# Patient Record
Sex: FEMALE | Race: WHITE | NOT HISPANIC OR LATINO | Employment: OTHER | URBAN - METROPOLITAN AREA
[De-identification: names, ages, dates, MRNs, and addresses within clinical notes are randomized per-mention and may not be internally consistent; named-entity substitution may affect disease eponyms.]

---

## 2018-02-18 ENCOUNTER — HOSPITAL ENCOUNTER (EMERGENCY)
Facility: MEDICAL CENTER | Age: 78
End: 2018-02-18
Attending: EMERGENCY MEDICINE
Payer: MEDICARE

## 2018-02-18 ENCOUNTER — APPOINTMENT (OUTPATIENT)
Dept: RADIOLOGY | Facility: MEDICAL CENTER | Age: 78
End: 2018-02-18
Attending: EMERGENCY MEDICINE
Payer: MEDICARE

## 2018-02-18 VITALS
BODY MASS INDEX: 27.4 KG/M2 | RESPIRATION RATE: 18 BRPM | HEIGHT: 67 IN | HEART RATE: 68 BPM | DIASTOLIC BLOOD PRESSURE: 59 MMHG | SYSTOLIC BLOOD PRESSURE: 126 MMHG | WEIGHT: 174.6 LBS | TEMPERATURE: 98.9 F | OXYGEN SATURATION: 92 %

## 2018-02-18 DIAGNOSIS — Z72.0 TOBACCO ABUSE: ICD-10-CM

## 2018-02-18 DIAGNOSIS — J44.1 ACUTE EXACERBATION OF CHRONIC OBSTRUCTIVE PULMONARY DISEASE (COPD) (HCC): ICD-10-CM

## 2018-02-18 DIAGNOSIS — J98.4 PNEUMONITIS: ICD-10-CM

## 2018-02-18 PROCEDURE — 71045 X-RAY EXAM CHEST 1 VIEW: CPT

## 2018-02-18 PROCEDURE — 304561 HCHG STAT O2

## 2018-02-18 PROCEDURE — 700111 HCHG RX REV CODE 636 W/ 250 OVERRIDE (IP): Performed by: EMERGENCY MEDICINE

## 2018-02-18 PROCEDURE — 94640 AIRWAY INHALATION TREATMENT: CPT

## 2018-02-18 PROCEDURE — A9270 NON-COVERED ITEM OR SERVICE: HCPCS | Performed by: EMERGENCY MEDICINE

## 2018-02-18 PROCEDURE — 700101 HCHG RX REV CODE 250: Performed by: EMERGENCY MEDICINE

## 2018-02-18 PROCEDURE — 700102 HCHG RX REV CODE 250 W/ 637 OVERRIDE(OP): Performed by: EMERGENCY MEDICINE

## 2018-02-18 PROCEDURE — 99284 EMERGENCY DEPT VISIT MOD MDM: CPT

## 2018-02-18 RX ORDER — DULOXETIN HYDROCHLORIDE 60 MG/1
60 CAPSULE, DELAYED RELEASE ORAL DAILY
Status: SHIPPED | COMMUNITY
End: 2019-01-01

## 2018-02-18 RX ORDER — PREDNISONE 20 MG/1
20 TABLET ORAL DAILY
Qty: 6 TAB | Refills: 0 | Status: SHIPPED | OUTPATIENT
Start: 2018-02-18 | End: 2018-02-24

## 2018-02-18 RX ORDER — TRIAMTERENE AND HYDROCHLOROTHIAZIDE 37.5; 25 MG/1; MG/1
1 TABLET ORAL DAILY
Status: SHIPPED | COMMUNITY
End: 2019-01-01

## 2018-02-18 RX ORDER — PREDNISONE 20 MG/1
40 TABLET ORAL ONCE
Status: DISCONTINUED | OUTPATIENT
Start: 2018-02-18 | End: 2018-02-18

## 2018-02-18 RX ORDER — IPRATROPIUM BROMIDE AND ALBUTEROL SULFATE 2.5; .5 MG/3ML; MG/3ML
3 SOLUTION RESPIRATORY (INHALATION) ONCE
Status: COMPLETED | OUTPATIENT
Start: 2018-02-18 | End: 2018-02-18

## 2018-02-18 RX ORDER — BENZONATATE 100 MG/1
100 CAPSULE ORAL 3 TIMES DAILY PRN
Qty: 30 CAP | Refills: 0 | Status: SHIPPED | OUTPATIENT
Start: 2018-02-18 | End: 2019-01-01

## 2018-02-18 RX ORDER — POTASSIUM CHLORIDE 750 MG/1
10 TABLET, EXTENDED RELEASE ORAL 2 TIMES DAILY
Status: SHIPPED | COMMUNITY
End: 2019-01-01

## 2018-02-18 RX ORDER — ALPRAZOLAM 1 MG/1
1 TABLET ORAL 2 TIMES DAILY PRN
COMMUNITY

## 2018-02-18 RX ORDER — PREDNISONE 20 MG/1
20 TABLET ORAL ONCE
Status: COMPLETED | OUTPATIENT
Start: 2018-02-18 | End: 2018-02-18

## 2018-02-18 RX ORDER — ASPIRIN 325 MG
650 TABLET ORAL EVERY 6 HOURS PRN
Status: SHIPPED | COMMUNITY
End: 2019-01-01

## 2018-02-18 RX ORDER — DOXYCYCLINE 100 MG/1
100 TABLET ORAL ONCE
Status: COMPLETED | OUTPATIENT
Start: 2018-02-18 | End: 2018-02-18

## 2018-02-18 RX ORDER — ESOMEPRAZOLE MAGNESIUM 40 MG/1
40 CAPSULE, DELAYED RELEASE ORAL
Status: SHIPPED | COMMUNITY
End: 2019-01-01

## 2018-02-18 RX ORDER — QUETIAPINE FUMARATE 25 MG/1
25 TABLET, FILM COATED ORAL
Status: SHIPPED | COMMUNITY
End: 2019-01-01

## 2018-02-18 RX ORDER — DOXYCYCLINE 100 MG/1
100 CAPSULE ORAL 2 TIMES DAILY
Qty: 14 CAP | Refills: 0 | Status: SHIPPED | OUTPATIENT
Start: 2018-02-18 | End: 2018-02-25

## 2018-02-18 RX ADMIN — DOXYCYCLINE 100 MG: 100 TABLET ORAL at 09:06

## 2018-02-18 RX ADMIN — PREDNISONE 20 MG: 20 TABLET ORAL at 09:06

## 2018-02-18 RX ADMIN — IPRATROPIUM BROMIDE AND ALBUTEROL SULFATE 3 ML: .5; 3 SOLUTION RESPIRATORY (INHALATION) at 09:19

## 2018-02-18 ASSESSMENT — COPD QUESTIONNAIRES
HAVE YOU SMOKED AT LEAST 100 CIGARETTES IN YOUR ENTIRE LIFE: YES
DO YOU EVER COUGH UP ANY MUCUS OR PHLEGM?: YES, A FEW DAYS A WEEK OR MONTH
DURING THE PAST 4 WEEKS HOW MUCH DID YOU FEEL SHORT OF BREATH: MOST  OR ALL OF THE TIME
COPD SCREENING SCORE: 8

## 2018-02-18 ASSESSMENT — LIFESTYLE VARIABLES: EVER_SMOKED: YES

## 2018-02-18 NOTE — ED PROVIDER NOTES
"ED Provider Note    CHIEF COMPLAINT  Chief Complaint   Patient presents with   • Cough     x 1 week   • Congestion   • Head Ache       HPI  Thea Kessler is a 77 y.o. female who presents stating for about 7-10 days she's had cough, congestion in the sinuses and chest and low-grade headache. Denies being around anybody with influenza or a bad chest cold. Does have a history of smoking a pack a day and does not want to quit. Felt feverish yesterday but did not take her temperature and has not had any hemoptysis, chest pain or pressure. She is concerned about the possibility of pneumonia and has not been on recent steroids    REVIEW OF SYSTEMS  See HPI for further details. All other systems are negative.     PAST MEDICAL HISTORY  Past Medical History:   Diagnosis Date   • Arthritis    • Depression    • GERD (gastroesophageal reflux disease)    • Hypokalemia    • Swelling     Left side of body, pt states will swell on Left side unless taking Maxide       FAMILY HISTORY  History reviewed. No pertinent family history.    SOCIAL HISTORY   reports that she has been smoking.  She does not have any smokeless tobacco history on file. She reports that she drinks alcohol. She reports that she does not use drugs.    SURGICAL HISTORY  Past Surgical History:   Procedure Laterality Date   • APPENDECTOMY     • CHOLECYSTECTOMY     • HEMORRHOIDECTOMY         CURRENT MEDICATIONS  Home Medications    **Home medications have not yet been reviewed for this encounter**         ALLERGIES  Allergies   Allergen Reactions   • Pcn [Penicillins]    • Sulfa Drugs        PHYSICAL EXAM  VITAL SIGNS: /59   Pulse 60   Temp 37.2 °C (98.9 °F)   Resp 18   Ht 1.702 m (5' 7\")   Wt 79.2 kg (174 lb 9.7 oz)   SpO2 90%   BMI 27.35 kg/m²    Constitutional: Well developed, Well nourished, mild respiratory distress, Non-toxic appearance. Does smell of cigarettes  HENT: Normocephalic, Atraumatic, Bilateral external ears normal, Oropharynx is " clear mucous membranes are moist. No oral exudates or nasal discharge.   Eyes: Pupils are equal round and reactive, EOMI, Conjunctiva normal, No discharge.   Neck: Normal range of motion, No tenderness, Supple, No stridor. No meningismus.  Lymphatic: No lymphadenopathy noted.   Cardiovascular: Regular rate and rhythm without murmur rub or gallop.  Thorax & Lungs: Bilateral end-expiratory wheezes with slightly prolonged expiratory phase. There is no chest wall tenderness.   Abdomen: Soft non-tender non-distended. There is no rebound or guarding. No organomegaly is appreciated. Bowel sounds are normal.  Skin: Normal without rash.   Back: No CVA or spinal tenderness.   Extremities: Intact distal pulses, No edema, No tenderness, No cyanosis, No clubbing. Capillary refill is less than 2 seconds.  Musculoskeletal: Good range of motion in all major joints. No tenderness to palpation or major deformities noted.   Neurologic: Alert & oriented x 3, Normal motor function, Normal sensory function, No focal deficits noted. Reflexes are normal.  Psychiatric: Affect normal, Judgment normal, Mood normal. There is no suicidal ideation or patient reported hallucinations.       RADIOLOGY/PROCEDURES  DX-CHEST-PORTABLE (1 VIEW)   Final Result         1.  Mild cardiomegaly.      2.  Pulmonary opacifications could be due to edema, possibly from CHF. Bronchitis or pneumonitis are also possible.      3.  No consolidations.            COURSE & MEDICAL DECISION MAKING  Pertinent Labs & Imaging studies reviewed. (See chart for details)  Patient was concerned about pneumonia. Pulse oximetry initially was normal but she did have some mild hypoxia developed in the emergency department even after we gave her DuoNeb for and expiratory wheezes from COPD exacerbation. Chest x-ray did reveal that she does have some ulnar area opacifications likely secondary pneumonitis    And given her low dose prednisone as she did not want to take higher dose as  recommended. We will give her 20 mg here and 20 mg daily for the next 6 days. I explained her chest x-ray findings and she continued to have pulse oximetry anywhere between 88 and 91% on room air but she feels comfortable and this may be her baseline. She does not want home oxygen and states that she is willing to cut her cigarette consumption and half slow her emphysema and this is recommended    She is discharged on doxycycline for pneumonitis, prednisone low dose and instructed to reduce her smoking. I spent more than 5 minutes with her about smoking cessation and she states discharged in stable condition will follow up with Dr. Ackerman this coming week    FINAL IMPRESSION  1. Acute exacerbation of chronic obstructive pulmonary disease (COPD) (CMS-HCC)    2. Pneumonitis    3. Tobacco abuse    Tobacco cessation counseling, 5 minutes         Electronically signed by: Laith Silva, 2/18/2018 9:37 AM

## 2018-02-18 NOTE — ED NOTES
"Chief Complaint   Patient presents with   • Cough     x 1 week   • Congestion   • Head Ache     /59   Pulse 77   Temp 37.2 °C (98.9 °F)   Resp 18   Ht 1.702 m (5' 7\")   Wt 79.2 kg (174 lb 9.7 oz)   SpO2 89%   BMI 27.35 kg/m²     Pt placed on 2L NC to maintain SPO2 90%.    "

## 2018-02-18 NOTE — FLOWSHEET NOTE
02/18/18 0919   Events/Summary/Plan   Events/Summary/Plan Tx given in ER   Interdisciplinary Plan of Care-Goals (Indications)   Obstructive Ventilatory Defect or Pulmonary Disease without Obvious Obstruction Strong Subjective / Objective Improvement   Interdisciplinary Plan of Care-Outcomes    Bronchodilator Outcome Patient at Stable Baseline   Education   Education Yes - Pt. / Family has been Instructed in use of Respiratory Medications and Adverse Reactions   RT Assessment of Delivered Medications   Evaluation of Medication Delivery Daily Yes-- Pt /Family has been Instructed in use of Respiratory Medications and Adverse Reactions   SVN Group   #SVN Performed Yes   Given By: Mouthpiece   Date SVN Last Changed 02/18/18   Date SVN Next Change Due (Q 7 Days) 02/25/18   Respiratory WDL   Respiratory (WDL) X   Chest Exam   Work Of Breathing / Effort Mild   Respiration 18   Pulse 60   Breath Sounds   Pre/Post Intervention Pre Intervention Assessment   RUL Breath Sounds Clear   RML Breath Sounds Clear   RLL Breath Sounds Diminished   GABY Breath Sounds Clear   LLL Breath Sounds Diminished   Oximetry   Continuous Oximetry Yes   Oxygen   Home O2 Use Prior To Admission? No  (NC was not connected on O2)   Pulse Oximetry 90 %   O2 (LPM) 0   O2 (FiO2) 21   O2 Daily Delivery Respiratory  Room Air with O2 Available

## 2018-02-18 NOTE — ED NOTES
"DC instructions and prescription x3 given to pt/daughter. Pt verbalized understanding. Pt encouraged to take big deep breaths. Discussed importance of smoking cessation. Verbalized understanding. Pt daughter verbalized unhappiness with ERP not prescribing Seroqeul and Cymbalta for pt while she is away from home. \"What is she supposed to do without them? I dont understand why he cant just prescribe a couple of pills.\" Situation explained to pt/daughter. Continue to verbalize unhappiness. Pt steady on feet with 0 s/s distress noted. Pt dcd home with daughter to drive.   "

## 2018-02-18 NOTE — DISCHARGE INSTRUCTIONS
Chronic Obstructive Pulmonary Disease Exacerbation  Chronic obstructive pulmonary disease (COPD) is a common lung condition in which airflow from the lungs is limited. COPD is a general term that can be used to describe many different lung problems that limit airflow, including chronic bronchitis and emphysema. COPD exacerbations are episodes when breathing symptoms become much worse and require extra treatment. Without treatment, COPD exacerbations can be life threatening, and frequent COPD exacerbations can cause further damage to your lungs.  CAUSES  · Respiratory infections.  · Exposure to smoke.  · Exposure to air pollution, chemical fumes, or dust.  Sometimes there is no apparent cause or trigger.  RISK FACTORS  · Smoking cigarettes.  · Older age.  · Frequent prior COPD exacerbations.  SIGNS AND SYMPTOMS  · Increased coughing.  · Increased thick spit (sputum) production.  · Increased wheezing.  · Increased shortness of breath.  · Rapid breathing.  · Chest tightness.  DIAGNOSIS  Your medical history, a physical exam, and tests will help your health care provider make a diagnosis. Tests may include:  · A chest X-ray.  · Basic lab tests.  · Sputum testing.  · An arterial blood gas test.  TREATMENT  Depending on the severity of your COPD exacerbation, you may need to be admitted to a hospital for treatment. Some of the treatments commonly used to treat COPD exacerbations are:   · Antibiotic medicines.  · Bronchodilators. These are drugs that expand the air passages. They may be given with an inhaler or nebulizer. Spacer devices may be needed to help improve drug delivery.  · Corticosteroid medicines.  · Supplemental oxygen therapy.  · Airway clearing techniques, such as noninvasive ventilation (NIV) and positive expiratory pressure (PEP). These provide respiratory support through a mask or other noninvasive device.  HOME CARE INSTRUCTIONS  · Do not smoke. Quitting smoking is very important to prevent COPD from  getting worse and exacerbations from happening as often.  · Avoid exposure to all substances that irritate the airway, especially to tobacco smoke.  · If you were prescribed an antibiotic medicine, finish it all even if you start to feel better.  · Take all medicines as directed by your health care provider. It is important to use correct technique with inhaled medicines.  · Drink enough fluids to keep your urine clear or pale yellow (unless you have a medical condition that requires fluid restriction).  · Use a cool mist vaporizer. This makes it easier to clear your chest when you cough.  · If you have a home nebulizer and oxygen, continue to use them as directed.  · Maintain all necessary vaccinations to prevent infections.  · Exercise regularly.  · Eat a healthy diet.  · Keep all follow-up appointments as directed by your health care provider.  SEEK IMMEDIATE MEDICAL CARE IF:  · You have worsening shortness of breath.  · You have trouble talking.  · You have severe chest pain.  · You have blood in your sputum.  · You have a fever.  · You have weakness, vomit repeatedly, or faint.  · You feel confused.  · You continue to get worse.  MAKE SURE YOU:  · Understand these instructions.  · Will watch your condition.  · Will get help right away if you are not doing well or get worse.     This information is not intended to replace advice given to you by your health care provider. Make sure you discuss any questions you have with your health care provider.     Document Released: 10/14/2008 Document Revised: 01/08/2016 Document Reviewed: 08/22/2014  [employer] Interactive Patient Education ©2016 [employer]    Smoking Hazards  Smoking cigarettes is extremely bad for your health. Tobacco smoke has over 200 known poisons in it. It contains the poisonous gases nitrogen oxide and carbon monoxide. There are over 60 chemicals in tobacco smoke that cause cancer. Some of the chemicals found in cigarette smoke include:   · Cyanide.     · Benzene.    · Formaldehyde.    · Methanol (wood alcohol).    · Acetylene (fuel used in welding torches).    · Ammonia.    Even smoking lightly shortens your life expectancy by several years. You can greatly reduce the risk of medical problems for you and your family by stopping now. Smoking is the most preventable cause of death and disease in our society. Within days of quitting smoking, your circulation improves, you decrease the risk of having a heart attack, and your lung capacity improves. There may be some increased phlegm in the first few days after quitting, and it may take months for your lungs to clear up completely. Quitting for 10 years reduces your risk of developing lung cancer to almost that of a nonsmoker.   WHAT ARE THE RISKS OF SMOKING?  Cigarette smokers have an increased risk of many serious medical problems, including:  · Lung cancer.    · Lung disease (such as pneumonia, bronchitis, and emphysema).    · Heart attack and chest pain due to the heart not getting enough oxygen (angina).    · Heart disease and peripheral blood vessel disease.    · Hypertension.    · Stroke.    · Oral cancer (cancer of the lip, mouth, or voice box).    · Bladder cancer.    · Pancreatic cancer.    · Cervical cancer.    · Pregnancy complications, including premature birth.    · Stillbirths and smaller  babies, birth defects, and genetic damage to sperm.    · Early menopause.    · Lower estrogen level for women.    · Infertility.    · Facial wrinkles.    · Blindness.    · Increased risk of broken bones (fractures).    · Senile dementia.    · Stomach ulcers and internal bleeding.    · Delayed wound healing and increased risk of complications during surgery.  Because of secondhand smoke exposure, children of smokers have an increased risk of the following:   · Sudden infant death syndrome (SIDS).    · Respiratory infections.    · Lung cancer.    · Heart disease.    · Ear infections.    WHY IS SMOKING  ADDICTIVE?  Nicotine is the chemical agent in tobacco that is capable of causing addiction or dependence. When you smoke and inhale, nicotine is absorbed rapidly into the bloodstream through your lungs. Both inhaled and noninhaled nicotine may be addictive.   WHAT ARE THE BENEFITS OF QUITTING?   There are many health benefits to quitting smoking. Some are:   · The likelihood of developing cancer and heart disease decreases. Health improvements are seen almost immediately.    · Blood pressure, pulse rate, and breathing patterns start returning to normal soon after quitting.    · People who quit may see an improvement in their overall quality of life.    HOW DO YOU QUIT SMOKING?  Smoking is an addiction with both physical and psychological effects, and longtime habits can be hard to change. Your health care provider can recommend:  · Programs and community resources, which may include group support, education, or therapy.  · Replacement products, such as patches, gum, and nasal sprays. Use these products only as directed. Do not replace cigarette smoking with electronic cigarettes (commonly called e-cigarettes). The safety of e-cigarettes is unknown, and some may contain harmful chemicals.  FOR MORE INFORMATION  · American Lung Association: www.lung.org  · American Cancer Society: www.cancer.org     This information is not intended to replace advice given to you by your health care provider. Make sure you discuss any questions you have with your health care provider.     Document Released: 01/25/2006 Document Revised: 10/08/2014 Document Reviewed: 06/09/2014  [employer] Interactive Patient Education ©2016 [employer]    Pulmonary Function Tests  A pulmonary function test measures how well you move air in and out of your lungs. There are a number of tests that can be done. The most often the measurement used is the peak flow test. This test can also be used at home. Examples of these include the Johnny Zone, Astech, Mini  "Valladares, and Spir-O-Flow meters.   Measuring how well air moves out of your lung can be used as a reliable guide to treatment, even if you have no symptoms. Normal values of peak flow rates depend on your age, height, and sex. If your peak flow measurement is lower than normal, treatment with inhaled medicines can be started to prevent a more serious episode of asthma or emphysema. You should measure your peak flows daily in the morning, and more often throughout the day if you have any symptoms such as shortness of breath or cough. Keep a record of your peak flow results to review with your caregiver. The mouth piece of your peak flow meter should be washed with soap and water once a week. Otherwise it does not need special care.  To measure your peak flow, proper technique is very important. Follow the instructions that accompany your meter. Usually you must set the indicator to 0, take a deep breath, and blow as quickly and forcefully as possible into the meter. Reset the meter and repeat the measurement twice; record your best result. If your test results put you in the \"green\" zone, this means your ability to move air is % of normal. No special treatment may be needed. If your test puts you in the \"yellow\" zone, you are between 50 and 80% of normal. Treatment should be started. Results in the \"red\" zone mean you are below 50% of normal and you should initiate treatment and call your caregiver right away.  Document Released: 01/25/2006 Document Revised: 03/11/2013 Document Reviewed: 12/18/2006  [employer]® Patient Information ©2013 [email or web address].  "

## 2018-02-18 NOTE — ED NOTES
"Med rec updated and complete  Allergies reviewed  Pt had a list of medications, went over list of medications returned list of medications back to pts daughter.  Pt states \"No antibiotics in the last 30 days\".    "

## 2019-01-01 ENCOUNTER — APPOINTMENT (OUTPATIENT)
Dept: RADIOLOGY | Facility: MEDICAL CENTER | Age: 79
End: 2019-01-01
Attending: EMERGENCY MEDICINE
Payer: MEDICARE

## 2019-01-01 ENCOUNTER — APPOINTMENT (OUTPATIENT)
Dept: RADIOLOGY | Facility: MEDICAL CENTER | Age: 79
DRG: 085 | End: 2019-01-01
Attending: INTERNAL MEDICINE
Payer: MEDICARE

## 2019-01-01 ENCOUNTER — APPOINTMENT (OUTPATIENT)
Dept: RADIOLOGY | Facility: MEDICAL CENTER | Age: 79
DRG: 085 | End: 2019-01-01
Attending: NEUROLOGICAL SURGERY
Payer: MEDICARE

## 2019-01-01 ENCOUNTER — HOSPITAL ENCOUNTER (EMERGENCY)
Facility: MEDICAL CENTER | Age: 79
End: 2019-06-13
Attending: EMERGENCY MEDICINE
Payer: MEDICARE

## 2019-01-01 ENCOUNTER — HOSPITAL ENCOUNTER (INPATIENT)
Facility: MEDICAL CENTER | Age: 79
LOS: 13 days | DRG: 085 | End: 2019-06-26
Attending: EMERGENCY MEDICINE | Admitting: SURGERY
Payer: MEDICARE

## 2019-01-01 ENCOUNTER — APPOINTMENT (OUTPATIENT)
Dept: RADIOLOGY | Facility: MEDICAL CENTER | Age: 79
DRG: 085 | End: 2019-01-01
Attending: EMERGENCY MEDICINE
Payer: MEDICARE

## 2019-01-01 ENCOUNTER — APPOINTMENT (OUTPATIENT)
Dept: RADIOLOGY | Facility: MEDICAL CENTER | Age: 79
DRG: 085 | End: 2019-01-01
Attending: NURSE PRACTITIONER
Payer: MEDICARE

## 2019-01-01 ENCOUNTER — APPOINTMENT (OUTPATIENT)
Dept: RADIOLOGY | Facility: MEDICAL CENTER | Age: 79
DRG: 085 | End: 2019-01-01
Attending: SURGERY
Payer: MEDICARE

## 2019-01-01 ENCOUNTER — HOSPITAL ENCOUNTER (INPATIENT)
Facility: REHABILITATION | Age: 79
End: 2019-01-01
Admitting: PHYSICAL MEDICINE & REHABILITATION
Payer: MEDICARE

## 2019-01-01 ENCOUNTER — APPOINTMENT (OUTPATIENT)
Dept: CARDIOLOGY | Facility: MEDICAL CENTER | Age: 79
DRG: 085 | End: 2019-01-01
Attending: SURGERY
Payer: MEDICARE

## 2019-01-01 VITALS
DIASTOLIC BLOOD PRESSURE: 70 MMHG | WEIGHT: 165.34 LBS | SYSTOLIC BLOOD PRESSURE: 138 MMHG | HEIGHT: 67 IN | RESPIRATION RATE: 13 BRPM | HEART RATE: 65 BPM | TEMPERATURE: 98.1 F | OXYGEN SATURATION: 99 % | BODY MASS INDEX: 25.95 KG/M2

## 2019-01-01 VITALS — HEIGHT: 71 IN | BODY MASS INDEX: 25.19 KG/M2 | TEMPERATURE: 98.5 F | WEIGHT: 179.9 LBS

## 2019-01-01 DIAGNOSIS — E87.6 HYPOKALEMIA: ICD-10-CM

## 2019-01-01 DIAGNOSIS — I60.9 SAH (SUBARACHNOID HEMORRHAGE) (HCC): ICD-10-CM

## 2019-01-01 DIAGNOSIS — G81.91 RIGHT HEMIPARESIS (HCC): ICD-10-CM

## 2019-01-01 DIAGNOSIS — R55 SYNCOPE, UNSPECIFIED SYNCOPE TYPE: ICD-10-CM

## 2019-01-01 DIAGNOSIS — R94.31 ABNORMAL EKG: ICD-10-CM

## 2019-01-01 DIAGNOSIS — T45.7X5A ADVERSE EFFECT OF ANTIPLATELET AGENT, INITIAL ENCOUNTER: ICD-10-CM

## 2019-01-01 DIAGNOSIS — I62.9 INTRACRANIAL BLEEDING (HCC): ICD-10-CM

## 2019-01-01 DIAGNOSIS — R13.10 DYSPHAGIA, UNSPECIFIED TYPE: ICD-10-CM

## 2019-01-01 DIAGNOSIS — Z79.01 CHRONIC ANTICOAGULATION: ICD-10-CM

## 2019-01-01 LAB
ABO GROUP BLD: NORMAL
ABO GROUP BLD: NORMAL
ALBUMIN SERPL BCP-MCNC: 3 G/DL (ref 3.2–4.9)
ALBUMIN SERPL BCP-MCNC: 3 G/DL (ref 3.2–4.9)
ALBUMIN SERPL BCP-MCNC: 3.2 G/DL (ref 3.2–4.9)
ALBUMIN SERPL BCP-MCNC: 3.6 G/DL (ref 3.2–4.9)
ALBUMIN/GLOB SERPL: 0.9 G/DL
ALBUMIN/GLOB SERPL: 0.9 G/DL
ALBUMIN/GLOB SERPL: 1 G/DL
ALBUMIN/GLOB SERPL: 1.2 G/DL
ALP SERPL-CCNC: 128 U/L (ref 30–99)
ALP SERPL-CCNC: 68 U/L (ref 30–99)
ALP SERPL-CCNC: 80 U/L (ref 30–99)
ALP SERPL-CCNC: 84 U/L (ref 30–99)
ALT SERPL-CCNC: 10 U/L (ref 2–50)
ALT SERPL-CCNC: 11 U/L (ref 2–50)
ALT SERPL-CCNC: 13 U/L (ref 2–50)
ALT SERPL-CCNC: 20 U/L (ref 2–50)
ANION GAP SERPL CALC-SCNC: 11 MMOL/L (ref 0–11.9)
ANION GAP SERPL CALC-SCNC: 12 MMOL/L (ref 0–11.9)
ANION GAP SERPL CALC-SCNC: 13 MMOL/L (ref 0–11.9)
ANION GAP SERPL CALC-SCNC: 8 MMOL/L (ref 0–11.9)
ANION GAP SERPL CALC-SCNC: 8 MMOL/L (ref 0–11.9)
ANION GAP SERPL CALC-SCNC: 9 MMOL/L (ref 0–11.9)
APPEARANCE UR: ABNORMAL
APPEARANCE UR: CLEAR
APTT PPP: 30 SEC (ref 24.7–36)
APTT PPP: 30.1 SEC (ref 24.7–36)
AST SERPL-CCNC: 23 U/L (ref 12–45)
AST SERPL-CCNC: 25 U/L (ref 12–45)
AST SERPL-CCNC: 28 U/L (ref 12–45)
AST SERPL-CCNC: 42 U/L (ref 12–45)
BACTERIA #/AREA URNS HPF: ABNORMAL /HPF
BACTERIA #/AREA URNS HPF: NEGATIVE /HPF
BACTERIA BLD CULT: NORMAL
BACTERIA BLD CULT: NORMAL
BACTERIA UR CULT: ABNORMAL
BACTERIA UR CULT: ABNORMAL
BASOPHILS # BLD AUTO: 0.3 % (ref 0–1.8)
BASOPHILS # BLD AUTO: 0.6 % (ref 0–1.8)
BASOPHILS # BLD AUTO: 0.6 % (ref 0–1.8)
BASOPHILS # BLD AUTO: 0.7 % (ref 0–1.8)
BASOPHILS # BLD AUTO: 0.7 % (ref 0–1.8)
BASOPHILS # BLD AUTO: 0.9 % (ref 0–1.8)
BASOPHILS # BLD AUTO: 0.9 % (ref 0–1.8)
BASOPHILS # BLD AUTO: 1.1 % (ref 0–1.8)
BASOPHILS # BLD: 0.02 K/UL (ref 0–0.12)
BASOPHILS # BLD: 0.07 K/UL (ref 0–0.12)
BASOPHILS # BLD: 0.08 K/UL (ref 0–0.12)
BASOPHILS # BLD: 0.09 K/UL (ref 0–0.12)
BASOPHILS # BLD: 0.1 K/UL (ref 0–0.12)
BASOPHILS # BLD: 0.1 K/UL (ref 0–0.12)
BASOPHILS # BLD: 0.11 K/UL (ref 0–0.12)
BASOPHILS # BLD: 0.13 K/UL (ref 0–0.12)
BILIRUB SERPL-MCNC: 0.9 MG/DL (ref 0.1–1.5)
BILIRUB SERPL-MCNC: 1 MG/DL (ref 0.1–1.5)
BILIRUB SERPL-MCNC: 1.2 MG/DL (ref 0.1–1.5)
BILIRUB SERPL-MCNC: 1.5 MG/DL (ref 0.1–1.5)
BILIRUB UR QL STRIP.AUTO: NEGATIVE
BILIRUB UR QL STRIP.AUTO: NEGATIVE
BLD GP AB SCN SERPL QL: NORMAL
BLD GP AB SCN SERPL QL: NORMAL
BUN SERPL-MCNC: 11 MG/DL (ref 8–22)
BUN SERPL-MCNC: 11 MG/DL (ref 8–22)
BUN SERPL-MCNC: 12 MG/DL (ref 8–22)
BUN SERPL-MCNC: 12 MG/DL (ref 8–22)
BUN SERPL-MCNC: 14 MG/DL (ref 8–22)
BUN SERPL-MCNC: 15 MG/DL (ref 8–22)
BUN SERPL-MCNC: 15 MG/DL (ref 8–22)
BUN SERPL-MCNC: 16 MG/DL (ref 8–22)
BUN SERPL-MCNC: 22 MG/DL (ref 8–22)
BUN SERPL-MCNC: 23 MG/DL (ref 8–22)
CALCIUM SERPL-MCNC: 8.4 MG/DL (ref 8.5–10.5)
CALCIUM SERPL-MCNC: 8.8 MG/DL (ref 8.5–10.5)
CALCIUM SERPL-MCNC: 8.9 MG/DL (ref 8.4–10.2)
CALCIUM SERPL-MCNC: 9 MG/DL (ref 8.5–10.5)
CALCIUM SERPL-MCNC: 9 MG/DL (ref 8.5–10.5)
CALCIUM SERPL-MCNC: 9.1 MG/DL (ref 8.5–10.5)
CALCIUM SERPL-MCNC: 9.3 MG/DL (ref 8.5–10.5)
CALCIUM SERPL-MCNC: 9.4 MG/DL (ref 8.5–10.5)
CALCIUM SERPL-MCNC: 9.6 MG/DL (ref 8.5–10.5)
CALCIUM SERPL-MCNC: 9.7 MG/DL (ref 8.5–10.5)
CFT BLD TEG: 5.1 MIN (ref 5–10)
CHLORIDE SERPL-SCNC: 100 MMOL/L (ref 96–112)
CHLORIDE SERPL-SCNC: 100 MMOL/L (ref 96–112)
CHLORIDE SERPL-SCNC: 102 MMOL/L (ref 96–112)
CHLORIDE SERPL-SCNC: 102 MMOL/L (ref 96–112)
CHLORIDE SERPL-SCNC: 104 MMOL/L (ref 96–112)
CHLORIDE SERPL-SCNC: 94 MMOL/L (ref 96–112)
CHLORIDE SERPL-SCNC: 96 MMOL/L (ref 96–112)
CHLORIDE SERPL-SCNC: 96 MMOL/L (ref 96–112)
CHLORIDE SERPL-SCNC: 98 MMOL/L (ref 96–112)
CHLORIDE SERPL-SCNC: 98 MMOL/L (ref 96–112)
CLOT ANGLE BLD TEG: 69.5 DEGREES (ref 53–72)
CLOT LYSIS 30M P MA LENFR BLD TEG: 0.3 % (ref 0–8)
CO2 SERPL-SCNC: 22 MMOL/L (ref 20–33)
CO2 SERPL-SCNC: 24 MMOL/L (ref 20–33)
CO2 SERPL-SCNC: 26 MMOL/L (ref 20–33)
CO2 SERPL-SCNC: 27 MMOL/L (ref 20–33)
CO2 SERPL-SCNC: 28 MMOL/L (ref 20–33)
CO2 SERPL-SCNC: 28 MMOL/L (ref 20–33)
CO2 SERPL-SCNC: 29 MMOL/L (ref 20–33)
CO2 SERPL-SCNC: 31 MMOL/L (ref 20–33)
COLOR UR: YELLOW
COLOR UR: YELLOW
CREAT SERPL-MCNC: 0.91 MG/DL (ref 0.5–1.4)
CREAT SERPL-MCNC: 0.91 MG/DL (ref 0.5–1.4)
CREAT SERPL-MCNC: 0.93 MG/DL (ref 0.5–1.4)
CREAT SERPL-MCNC: 0.96 MG/DL (ref 0.5–1.4)
CREAT SERPL-MCNC: 0.99 MG/DL (ref 0.5–1.4)
CREAT SERPL-MCNC: 1 MG/DL (ref 0.5–1.4)
CREAT SERPL-MCNC: 1 MG/DL (ref 0.5–1.4)
CREAT SERPL-MCNC: 1.14 MG/DL (ref 0.5–1.4)
CREAT SERPL-MCNC: 1.17 MG/DL (ref 0.5–1.4)
CREAT SERPL-MCNC: 1.23 MG/DL (ref 0.5–1.4)
CT.EXTRINSIC BLD ROTEM: 1.5 MIN (ref 1–3)
EKG IMPRESSION: NORMAL
EOSINOPHIL # BLD AUTO: 0 K/UL (ref 0–0.51)
EOSINOPHIL # BLD AUTO: 0.04 K/UL (ref 0–0.51)
EOSINOPHIL # BLD AUTO: 0.08 K/UL (ref 0–0.51)
EOSINOPHIL # BLD AUTO: 0.09 K/UL (ref 0–0.51)
EOSINOPHIL # BLD AUTO: 0.15 K/UL (ref 0–0.51)
EOSINOPHIL # BLD AUTO: 0.15 K/UL (ref 0–0.51)
EOSINOPHIL # BLD AUTO: 0.19 K/UL (ref 0–0.51)
EOSINOPHIL # BLD AUTO: 0.26 K/UL (ref 0–0.51)
EOSINOPHIL NFR BLD: 0 % (ref 0–6.9)
EOSINOPHIL NFR BLD: 0.3 % (ref 0–6.9)
EOSINOPHIL NFR BLD: 0.6 % (ref 0–6.9)
EOSINOPHIL NFR BLD: 0.8 % (ref 0–6.9)
EOSINOPHIL NFR BLD: 1.2 % (ref 0–6.9)
EOSINOPHIL NFR BLD: 1.2 % (ref 0–6.9)
EOSINOPHIL NFR BLD: 1.7 % (ref 0–6.9)
EOSINOPHIL NFR BLD: 2.2 % (ref 0–6.9)
EPI CELLS #/AREA URNS HPF: NEGATIVE /HPF
EPI CELLS #/AREA URNS HPF: NEGATIVE /HPF
ERYTHROCYTE [DISTWIDTH] IN BLOOD BY AUTOMATED COUNT: 46.7 FL (ref 35.9–50)
ERYTHROCYTE [DISTWIDTH] IN BLOOD BY AUTOMATED COUNT: 48.4 FL (ref 35.9–50)
ERYTHROCYTE [DISTWIDTH] IN BLOOD BY AUTOMATED COUNT: 48.4 FL (ref 35.9–50)
ERYTHROCYTE [DISTWIDTH] IN BLOOD BY AUTOMATED COUNT: 49.5 FL (ref 35.9–50)
ERYTHROCYTE [DISTWIDTH] IN BLOOD BY AUTOMATED COUNT: 50.5 FL (ref 35.9–50)
ERYTHROCYTE [DISTWIDTH] IN BLOOD BY AUTOMATED COUNT: 50.6 FL (ref 35.9–50)
ERYTHROCYTE [DISTWIDTH] IN BLOOD BY AUTOMATED COUNT: 50.8 FL (ref 35.9–50)
ERYTHROCYTE [DISTWIDTH] IN BLOOD BY AUTOMATED COUNT: 51.7 FL (ref 35.9–50)
ERYTHROCYTE [DISTWIDTH] IN BLOOD BY AUTOMATED COUNT: 52.3 FL (ref 35.9–50)
ETHANOL BLD-MCNC: 0 G/DL
GLOBULIN SER CALC-MCNC: 3 G/DL (ref 1.9–3.5)
GLOBULIN SER CALC-MCNC: 3 G/DL (ref 1.9–3.5)
GLOBULIN SER CALC-MCNC: 3.2 G/DL (ref 1.9–3.5)
GLOBULIN SER CALC-MCNC: 3.4 G/DL (ref 1.9–3.5)
GLUCOSE BLD-MCNC: 103 MG/DL (ref 65–99)
GLUCOSE BLD-MCNC: 109 MG/DL (ref 65–99)
GLUCOSE BLD-MCNC: 128 MG/DL (ref 65–99)
GLUCOSE BLD-MCNC: 150 MG/DL (ref 65–99)
GLUCOSE SERPL-MCNC: 102 MG/DL (ref 65–99)
GLUCOSE SERPL-MCNC: 102 MG/DL (ref 65–99)
GLUCOSE SERPL-MCNC: 106 MG/DL (ref 65–99)
GLUCOSE SERPL-MCNC: 107 MG/DL (ref 65–99)
GLUCOSE SERPL-MCNC: 111 MG/DL (ref 65–99)
GLUCOSE SERPL-MCNC: 113 MG/DL (ref 65–99)
GLUCOSE SERPL-MCNC: 118 MG/DL (ref 65–99)
GLUCOSE SERPL-MCNC: 120 MG/DL (ref 65–99)
GLUCOSE SERPL-MCNC: 131 MG/DL (ref 65–99)
GLUCOSE SERPL-MCNC: 151 MG/DL (ref 65–99)
GLUCOSE UR STRIP.AUTO-MCNC: NEGATIVE MG/DL
GLUCOSE UR STRIP.AUTO-MCNC: NEGATIVE MG/DL
HCT VFR BLD AUTO: 35.6 % (ref 37–47)
HCT VFR BLD AUTO: 37 % (ref 37–47)
HCT VFR BLD AUTO: 38.8 % (ref 37–47)
HCT VFR BLD AUTO: 40.3 % (ref 37–47)
HCT VFR BLD AUTO: 40.3 % (ref 37–47)
HCT VFR BLD AUTO: 40.9 % (ref 37–47)
HCT VFR BLD AUTO: 41.2 % (ref 37–47)
HCT VFR BLD AUTO: 42.8 % (ref 37–47)
HCT VFR BLD AUTO: 43.6 % (ref 37–47)
HGB BLD-MCNC: 11.8 G/DL (ref 12–16)
HGB BLD-MCNC: 12.2 G/DL (ref 12–16)
HGB BLD-MCNC: 12.4 G/DL (ref 12–16)
HGB BLD-MCNC: 13.6 G/DL (ref 12–16)
HGB BLD-MCNC: 13.7 G/DL (ref 12–16)
HGB BLD-MCNC: 13.8 G/DL (ref 12–16)
HGB BLD-MCNC: 13.9 G/DL (ref 12–16)
HGB BLD-MCNC: 14.2 G/DL (ref 12–16)
HGB BLD-MCNC: 14.5 G/DL (ref 12–16)
HYALINE CASTS #/AREA URNS LPF: ABNORMAL /LPF
HYALINE CASTS #/AREA URNS LPF: ABNORMAL /LPF
IMM GRANULOCYTES # BLD AUTO: 0.02 K/UL (ref 0–0.11)
IMM GRANULOCYTES # BLD AUTO: 0.03 K/UL (ref 0–0.11)
IMM GRANULOCYTES # BLD AUTO: 0.03 K/UL (ref 0–0.11)
IMM GRANULOCYTES # BLD AUTO: 0.04 K/UL (ref 0–0.11)
IMM GRANULOCYTES # BLD AUTO: 0.05 K/UL (ref 0–0.11)
IMM GRANULOCYTES # BLD AUTO: 0.06 K/UL (ref 0–0.11)
IMM GRANULOCYTES NFR BLD AUTO: 0.2 % (ref 0–0.9)
IMM GRANULOCYTES NFR BLD AUTO: 0.3 % (ref 0–0.9)
IMM GRANULOCYTES NFR BLD AUTO: 0.3 % (ref 0–0.9)
IMM GRANULOCYTES NFR BLD AUTO: 0.4 % (ref 0–0.9)
IMM GRANULOCYTES NFR BLD AUTO: 0.5 % (ref 0–0.9)
IMM GRANULOCYTES NFR BLD AUTO: 0.5 % (ref 0–0.9)
INR PPP: 1.06 (ref 0.87–1.13)
INR PPP: 1.15 (ref 0.87–1.13)
INR PPP: 1.31 (ref 0.87–1.13)
KETONES UR STRIP.AUTO-MCNC: ABNORMAL MG/DL
KETONES UR STRIP.AUTO-MCNC: NEGATIVE MG/DL
LACTATE BLD-SCNC: 1.7 MMOL/L (ref 0.5–2)
LEUKOCYTE ESTERASE UR QL STRIP.AUTO: ABNORMAL
LEUKOCYTE ESTERASE UR QL STRIP.AUTO: ABNORMAL
LV EJECT FRACT  99904: 40
LYMPHOCYTES # BLD AUTO: 0.74 K/UL (ref 1–4.8)
LYMPHOCYTES # BLD AUTO: 1.83 K/UL (ref 1–4.8)
LYMPHOCYTES # BLD AUTO: 1.92 K/UL (ref 1–4.8)
LYMPHOCYTES # BLD AUTO: 2.17 K/UL (ref 1–4.8)
LYMPHOCYTES # BLD AUTO: 2.23 K/UL (ref 1–4.8)
LYMPHOCYTES # BLD AUTO: 2.25 K/UL (ref 1–4.8)
LYMPHOCYTES # BLD AUTO: 2.49 K/UL (ref 1–4.8)
LYMPHOCYTES # BLD AUTO: 2.9 K/UL (ref 1–4.8)
LYMPHOCYTES NFR BLD: 10.2 % (ref 22–41)
LYMPHOCYTES NFR BLD: 13.9 % (ref 22–41)
LYMPHOCYTES NFR BLD: 15.7 % (ref 22–41)
LYMPHOCYTES NFR BLD: 16.8 % (ref 22–41)
LYMPHOCYTES NFR BLD: 17.5 % (ref 22–41)
LYMPHOCYTES NFR BLD: 17.6 % (ref 22–41)
LYMPHOCYTES NFR BLD: 22.6 % (ref 22–41)
LYMPHOCYTES NFR BLD: 24.6 % (ref 22–41)
MAGNESIUM SERPL-MCNC: 1.5 MG/DL (ref 1.5–2.5)
MAGNESIUM SERPL-MCNC: 1.7 MG/DL (ref 1.5–2.5)
MAGNESIUM SERPL-MCNC: 1.8 MG/DL (ref 1.5–2.5)
MAGNESIUM SERPL-MCNC: 2.4 MG/DL (ref 1.5–2.5)
MCF BLD TEG: 71.9 MM (ref 50–70)
MCH RBC QN AUTO: 29.8 PG (ref 27–33)
MCH RBC QN AUTO: 30.2 PG (ref 27–33)
MCH RBC QN AUTO: 30.3 PG (ref 27–33)
MCH RBC QN AUTO: 30.6 PG (ref 27–33)
MCH RBC QN AUTO: 30.6 PG (ref 27–33)
MCH RBC QN AUTO: 30.7 PG (ref 27–33)
MCH RBC QN AUTO: 30.7 PG (ref 27–33)
MCH RBC QN AUTO: 30.8 PG (ref 27–33)
MCH RBC QN AUTO: 31.1 PG (ref 27–33)
MCHC RBC AUTO-ENTMCNC: 32 G/DL (ref 33.6–35)
MCHC RBC AUTO-ENTMCNC: 33 G/DL (ref 33.6–35)
MCHC RBC AUTO-ENTMCNC: 33.1 G/DL (ref 33.6–35)
MCHC RBC AUTO-ENTMCNC: 33.2 G/DL (ref 33.6–35)
MCHC RBC AUTO-ENTMCNC: 33.3 G/DL (ref 33.6–35)
MCHC RBC AUTO-ENTMCNC: 33.3 G/DL (ref 33.6–35)
MCHC RBC AUTO-ENTMCNC: 33.7 G/DL (ref 33.6–35)
MCHC RBC AUTO-ENTMCNC: 34 G/DL (ref 33.6–35)
MCHC RBC AUTO-ENTMCNC: 34.2 G/DL (ref 33.6–35)
MCV RBC AUTO: 88.6 FL (ref 81.4–97.8)
MCV RBC AUTO: 90 FL (ref 81.4–97.8)
MCV RBC AUTO: 90.7 FL (ref 81.4–97.8)
MCV RBC AUTO: 91.6 FL (ref 81.4–97.8)
MCV RBC AUTO: 92.2 FL (ref 81.4–97.8)
MCV RBC AUTO: 92.4 FL (ref 81.4–97.8)
MCV RBC AUTO: 92.5 FL (ref 81.4–97.8)
MCV RBC AUTO: 93.3 FL (ref 81.4–97.8)
MCV RBC AUTO: 93.7 FL (ref 81.4–97.8)
MICRO URNS: ABNORMAL
MICRO URNS: ABNORMAL
MONOCYTES # BLD AUTO: 0.09 K/UL (ref 0–0.85)
MONOCYTES # BLD AUTO: 0.84 K/UL (ref 0–0.85)
MONOCYTES # BLD AUTO: 0.98 K/UL (ref 0–0.85)
MONOCYTES # BLD AUTO: 1.18 K/UL (ref 0–0.85)
MONOCYTES # BLD AUTO: 1.21 K/UL (ref 0–0.85)
MONOCYTES # BLD AUTO: 1.34 K/UL (ref 0–0.85)
MONOCYTES # BLD AUTO: 1.43 K/UL (ref 0–0.85)
MONOCYTES # BLD AUTO: 1.45 K/UL (ref 0–0.85)
MONOCYTES NFR BLD AUTO: 1.2 % (ref 0–13.4)
MONOCYTES NFR BLD AUTO: 10 % (ref 0–13.4)
MONOCYTES NFR BLD AUTO: 10.3 % (ref 0–13.4)
MONOCYTES NFR BLD AUTO: 10.9 % (ref 0–13.4)
MONOCYTES NFR BLD AUTO: 11.4 % (ref 0–13.4)
MONOCYTES NFR BLD AUTO: 7.7 % (ref 0–13.4)
MONOCYTES NFR BLD AUTO: 8.6 % (ref 0–13.4)
MONOCYTES NFR BLD AUTO: 8.9 % (ref 0–13.4)
NEUTROPHILS # BLD AUTO: 10.47 K/UL (ref 2–7.15)
NEUTROPHILS # BLD AUTO: 10.51 K/UL (ref 2–7.15)
NEUTROPHILS # BLD AUTO: 6.39 K/UL (ref 2–7.15)
NEUTROPHILS # BLD AUTO: 7.26 K/UL (ref 2–7.15)
NEUTROPHILS # BLD AUTO: 7.27 K/UL (ref 2–7.15)
NEUTROPHILS # BLD AUTO: 8.01 K/UL (ref 2–7.15)
NEUTROPHILS # BLD AUTO: 8.47 K/UL (ref 2–7.15)
NEUTROPHILS # BLD AUTO: 8.78 K/UL (ref 2–7.15)
NEUTROPHILS NFR BLD: 61.6 % (ref 44–72)
NEUTROPHILS NFR BLD: 65.9 % (ref 44–72)
NEUTROPHILS NFR BLD: 68.7 % (ref 44–72)
NEUTROPHILS NFR BLD: 68.7 % (ref 44–72)
NEUTROPHILS NFR BLD: 72.7 % (ref 44–72)
NEUTROPHILS NFR BLD: 73.6 % (ref 44–72)
NEUTROPHILS NFR BLD: 76.2 % (ref 44–72)
NEUTROPHILS NFR BLD: 87.9 % (ref 44–72)
NITRITE UR QL STRIP.AUTO: NEGATIVE
NITRITE UR QL STRIP.AUTO: NEGATIVE
NRBC # BLD AUTO: 0 K/UL
NRBC BLD-RTO: 0 /100 WBC
PA AA BLD-ACNC: 38.8 %
PA ADP BLD-ACNC: 76.7 %
PH UR STRIP.AUTO: 8 [PH]
PH UR STRIP.AUTO: 8.5 [PH]
PHOSPHATE SERPL-MCNC: 2.8 MG/DL (ref 2.5–4.5)
PLATELET # BLD AUTO: 183 K/UL (ref 164–446)
PLATELET # BLD AUTO: 190 K/UL (ref 164–446)
PLATELET # BLD AUTO: 225 K/UL (ref 164–446)
PLATELET # BLD AUTO: 225 K/UL (ref 164–446)
PLATELET # BLD AUTO: 234 K/UL (ref 164–446)
PLATELET # BLD AUTO: 246 K/UL (ref 164–446)
PLATELET # BLD AUTO: 248 K/UL (ref 164–446)
PLATELET # BLD AUTO: 330 K/UL (ref 164–446)
PLATELET # BLD AUTO: 333 K/UL (ref 164–446)
PMV BLD AUTO: 10.3 FL (ref 9–12.9)
PMV BLD AUTO: 10.6 FL (ref 9–12.9)
PMV BLD AUTO: 10.9 FL (ref 9–12.9)
PMV BLD AUTO: 11 FL (ref 9–12.9)
PMV BLD AUTO: 11.4 FL (ref 9–12.9)
PMV BLD AUTO: 11.5 FL (ref 9–12.9)
PMV BLD AUTO: 11.6 FL (ref 9–12.9)
PMV BLD AUTO: 11.6 FL (ref 9–12.9)
PMV BLD AUTO: 12 FL (ref 9–12.9)
POTASSIUM SERPL-SCNC: 2.5 MMOL/L (ref 3.6–5.5)
POTASSIUM SERPL-SCNC: 2.9 MMOL/L (ref 3.6–5.5)
POTASSIUM SERPL-SCNC: 2.9 MMOL/L (ref 3.6–5.5)
POTASSIUM SERPL-SCNC: 3.1 MMOL/L (ref 3.6–5.5)
POTASSIUM SERPL-SCNC: 3.3 MMOL/L (ref 3.6–5.5)
POTASSIUM SERPL-SCNC: 3.5 MMOL/L (ref 3.6–5.5)
POTASSIUM SERPL-SCNC: 3.7 MMOL/L (ref 3.6–5.5)
POTASSIUM SERPL-SCNC: 3.7 MMOL/L (ref 3.6–5.5)
POTASSIUM SERPL-SCNC: 3.8 MMOL/L (ref 3.6–5.5)
POTASSIUM SERPL-SCNC: 4 MMOL/L (ref 3.6–5.5)
PROT SERPL-MCNC: 6 G/DL (ref 6–8.2)
PROT SERPL-MCNC: 6.2 G/DL (ref 6–8.2)
PROT SERPL-MCNC: 6.6 G/DL (ref 6–8.2)
PROT SERPL-MCNC: 6.6 G/DL (ref 6–8.2)
PROT UR QL STRIP: NEGATIVE MG/DL
PROT UR QL STRIP: NEGATIVE MG/DL
PROTHROMBIN TIME: 14.1 SEC (ref 12–14.6)
PROTHROMBIN TIME: 15 SEC (ref 12–14.6)
PROTHROMBIN TIME: 16.5 SEC (ref 12–14.6)
RBC # BLD AUTO: 3.8 M/UL (ref 4.2–5.4)
RBC # BLD AUTO: 4.04 M/UL (ref 4.2–5.4)
RBC # BLD AUTO: 4.16 M/UL (ref 4.2–5.4)
RBC # BLD AUTO: 4.42 M/UL (ref 4.2–5.4)
RBC # BLD AUTO: 4.48 M/UL (ref 4.2–5.4)
RBC # BLD AUTO: 4.54 M/UL (ref 4.2–5.4)
RBC # BLD AUTO: 4.55 M/UL (ref 4.2–5.4)
RBC # BLD AUTO: 4.63 M/UL (ref 4.2–5.4)
RBC # BLD AUTO: 4.73 M/UL (ref 4.2–5.4)
RBC # URNS HPF: ABNORMAL /HPF
RBC # URNS HPF: ABNORMAL /HPF
RBC UR QL AUTO: ABNORMAL
RBC UR QL AUTO: ABNORMAL
RH BLD: NORMAL
RH BLD: NORMAL
SIGNIFICANT IND 70042: ABNORMAL
SIGNIFICANT IND 70042: NORMAL
SIGNIFICANT IND 70042: NORMAL
SITE SITE: ABNORMAL
SITE SITE: NORMAL
SITE SITE: NORMAL
SODIUM SERPL-SCNC: 133 MMOL/L (ref 135–145)
SODIUM SERPL-SCNC: 134 MMOL/L (ref 135–145)
SODIUM SERPL-SCNC: 135 MMOL/L (ref 135–145)
SODIUM SERPL-SCNC: 136 MMOL/L (ref 135–145)
SODIUM SERPL-SCNC: 137 MMOL/L (ref 135–145)
SODIUM SERPL-SCNC: 137 MMOL/L (ref 135–145)
SOURCE SOURCE: ABNORMAL
SOURCE SOURCE: NORMAL
SOURCE SOURCE: NORMAL
SP GR UR STRIP.AUTO: 1.01
SP GR UR STRIP.AUTO: 1.02
TEG ALGORITHM TGALG: ABNORMAL
TROPONIN I SERPL-MCNC: 0.04 NG/ML (ref 0–0.04)
TROPONIN I SERPL-MCNC: 0.05 NG/ML (ref 0–0.04)
UROBILINOGEN UR STRIP.AUTO-MCNC: 0.2 MG/DL
UROBILINOGEN UR STRIP.AUTO-MCNC: 0.2 MG/DL
WBC # BLD AUTO: 10.9 K/UL (ref 4.8–10.8)
WBC # BLD AUTO: 11 K/UL (ref 4.8–10.8)
WBC # BLD AUTO: 11.8 K/UL (ref 4.8–10.8)
WBC # BLD AUTO: 12.3 K/UL (ref 4.8–10.8)
WBC # BLD AUTO: 12.8 K/UL (ref 4.8–10.8)
WBC # BLD AUTO: 13.8 K/UL (ref 4.8–10.8)
WBC # BLD AUTO: 14.4 K/UL (ref 4.8–10.8)
WBC # BLD AUTO: 15.8 K/UL (ref 4.8–10.8)
WBC # BLD AUTO: 7.3 K/UL (ref 4.8–10.8)
WBC #/AREA URNS HPF: ABNORMAL /HPF
WBC #/AREA URNS HPF: ABNORMAL /HPF

## 2019-01-01 PROCEDURE — 80053 COMPREHEN METABOLIC PANEL: CPT | Mod: 91

## 2019-01-01 PROCEDURE — A9270 NON-COVERED ITEM OR SERVICE: HCPCS | Performed by: NURSE PRACTITIONER

## 2019-01-01 PROCEDURE — 700112 HCHG RX REV CODE 229: Performed by: NURSE PRACTITIONER

## 2019-01-01 PROCEDURE — 700105 HCHG RX REV CODE 258: Performed by: STUDENT IN AN ORGANIZED HEALTH CARE EDUCATION/TRAINING PROGRAM

## 2019-01-01 PROCEDURE — 700102 HCHG RX REV CODE 250 W/ 637 OVERRIDE(OP): Performed by: NURSE PRACTITIONER

## 2019-01-01 PROCEDURE — A9270 NON-COVERED ITEM OR SERVICE: HCPCS | Performed by: SURGERY

## 2019-01-01 PROCEDURE — 700105 HCHG RX REV CODE 258

## 2019-01-01 PROCEDURE — 700102 HCHG RX REV CODE 250 W/ 637 OVERRIDE(OP): Performed by: NEUROLOGICAL SURGERY

## 2019-01-01 PROCEDURE — 93005 ELECTROCARDIOGRAM TRACING: CPT | Performed by: SURGERY

## 2019-01-01 PROCEDURE — A9270 NON-COVERED ITEM OR SERVICE: HCPCS | Performed by: INTERNAL MEDICINE

## 2019-01-01 PROCEDURE — 700102 HCHG RX REV CODE 250 W/ 637 OVERRIDE(OP): Performed by: STUDENT IN AN ORGANIZED HEALTH CARE EDUCATION/TRAINING PROGRAM

## 2019-01-01 PROCEDURE — 770001 HCHG ROOM/CARE - MED/SURG/GYN PRIV*

## 2019-01-01 PROCEDURE — 700117 HCHG RX CONTRAST REV CODE 255: Performed by: NEUROLOGICAL SURGERY

## 2019-01-01 PROCEDURE — 700111 HCHG RX REV CODE 636 W/ 250 OVERRIDE (IP): Performed by: NEUROLOGICAL SURGERY

## 2019-01-01 PROCEDURE — 93005 ELECTROCARDIOGRAM TRACING: CPT | Performed by: STUDENT IN AN ORGANIZED HEALTH CARE EDUCATION/TRAINING PROGRAM

## 2019-01-01 PROCEDURE — 94760 N-INVAS EAR/PLS OXIMETRY 1: CPT

## 2019-01-01 PROCEDURE — 99221 1ST HOSP IP/OBS SF/LOW 40: CPT | Performed by: PSYCHIATRY & NEUROLOGY

## 2019-01-01 PROCEDURE — 700102 HCHG RX REV CODE 250 W/ 637 OVERRIDE(OP): Performed by: SURGERY

## 2019-01-01 PROCEDURE — 95951 EEG: CPT | Mod: 52 | Performed by: PSYCHIATRY & NEUROLOGY

## 2019-01-01 PROCEDURE — 700102 HCHG RX REV CODE 250 W/ 637 OVERRIDE(OP): Performed by: INTERNAL MEDICINE

## 2019-01-01 PROCEDURE — 82962 GLUCOSE BLOOD TEST: CPT

## 2019-01-01 PROCEDURE — 87077 CULTURE AEROBIC IDENTIFY: CPT

## 2019-01-01 PROCEDURE — 87186 SC STD MICRODIL/AGAR DIL: CPT

## 2019-01-01 PROCEDURE — 85025 COMPLETE CBC W/AUTO DIFF WBC: CPT

## 2019-01-01 PROCEDURE — 99233 SBSQ HOSP IP/OBS HIGH 50: CPT | Performed by: SURGERY

## 2019-01-01 PROCEDURE — 70496 CT ANGIOGRAPHY HEAD: CPT

## 2019-01-01 PROCEDURE — 70450 CT HEAD/BRAIN W/O DYE: CPT

## 2019-01-01 PROCEDURE — C9132 KCENTRA, PER I.U.: HCPCS | Performed by: EMERGENCY MEDICINE

## 2019-01-01 PROCEDURE — 97116 GAIT TRAINING THERAPY: CPT

## 2019-01-01 PROCEDURE — 700111 HCHG RX REV CODE 636 W/ 250 OVERRIDE (IP): Performed by: INTERNAL MEDICINE

## 2019-01-01 PROCEDURE — 95951 EEG: CPT | Mod: 26,52 | Performed by: PSYCHIATRY & NEUROLOGY

## 2019-01-01 PROCEDURE — 770020 HCHG ROOM/CARE - TELE (206)

## 2019-01-01 PROCEDURE — 83735 ASSAY OF MAGNESIUM: CPT

## 2019-01-01 PROCEDURE — 99285 EMERGENCY DEPT VISIT HI MDM: CPT

## 2019-01-01 PROCEDURE — 700111 HCHG RX REV CODE 636 W/ 250 OVERRIDE (IP): Performed by: NURSE PRACTITIONER

## 2019-01-01 PROCEDURE — 36415 COLL VENOUS BLD VENIPUNCTURE: CPT

## 2019-01-01 PROCEDURE — 80307 DRUG TEST PRSMV CHEM ANLYZR: CPT

## 2019-01-01 PROCEDURE — 99222 1ST HOSP IP/OBS MODERATE 55: CPT | Performed by: INTERNAL MEDICINE

## 2019-01-01 PROCEDURE — 93880 EXTRACRANIAL BILAT STUDY: CPT

## 2019-01-01 PROCEDURE — 97530 THERAPEUTIC ACTIVITIES: CPT

## 2019-01-01 PROCEDURE — 81001 URINALYSIS AUTO W/SCOPE: CPT

## 2019-01-01 PROCEDURE — 96372 THER/PROPH/DIAG INJ SC/IM: CPT

## 2019-01-01 PROCEDURE — 99233 SBSQ HOSP IP/OBS HIGH 50: CPT | Performed by: INTERNAL MEDICINE

## 2019-01-01 PROCEDURE — 83605 ASSAY OF LACTIC ACID: CPT

## 2019-01-01 PROCEDURE — 80048 BASIC METABOLIC PNL TOTAL CA: CPT

## 2019-01-01 PROCEDURE — 700105 HCHG RX REV CODE 258: Performed by: INTERNAL MEDICINE

## 2019-01-01 PROCEDURE — 84100 ASSAY OF PHOSPHORUS: CPT

## 2019-01-01 PROCEDURE — 93005 ELECTROCARDIOGRAM TRACING: CPT | Performed by: EMERGENCY MEDICINE

## 2019-01-01 PROCEDURE — 99232 SBSQ HOSP IP/OBS MODERATE 35: CPT | Performed by: SURGERY

## 2019-01-01 PROCEDURE — 84484 ASSAY OF TROPONIN QUANT: CPT | Mod: 91

## 2019-01-01 PROCEDURE — 87086 URINE CULTURE/COLONY COUNT: CPT

## 2019-01-01 PROCEDURE — 700111 HCHG RX REV CODE 636 W/ 250 OVERRIDE (IP): Performed by: SURGERY

## 2019-01-01 PROCEDURE — 80053 COMPREHEN METABOLIC PANEL: CPT

## 2019-01-01 PROCEDURE — 90670 PCV13 VACCINE IM: CPT | Performed by: SURGERY

## 2019-01-01 PROCEDURE — 30283B1 TRANSFUSION OF NONAUTOLOGOUS 4-FACTOR PROTHROMBIN COMPLEX CONCENTRATE INTO VEIN, PERCUTANEOUS APPROACH: ICD-10-PCS | Performed by: SURGERY

## 2019-01-01 PROCEDURE — 51798 US URINE CAPACITY MEASURE: CPT

## 2019-01-01 PROCEDURE — 97162 PT EVAL MOD COMPLEX 30 MIN: CPT

## 2019-01-01 PROCEDURE — 85730 THROMBOPLASTIN TIME PARTIAL: CPT

## 2019-01-01 PROCEDURE — 92523 SPEECH SOUND LANG COMPREHEN: CPT

## 2019-01-01 PROCEDURE — 86900 BLOOD TYPING SEROLOGIC ABO: CPT

## 2019-01-01 PROCEDURE — A9270 NON-COVERED ITEM OR SERVICE: HCPCS | Performed by: NEUROLOGICAL SURGERY

## 2019-01-01 PROCEDURE — 302129 PCA PLUS: Performed by: NURSE PRACTITIONER

## 2019-01-01 PROCEDURE — G0390 TRAUMA RESPONS W/HOSP CRITI: HCPCS

## 2019-01-01 PROCEDURE — 99291 CRITICAL CARE FIRST HOUR: CPT | Performed by: SURGERY

## 2019-01-01 PROCEDURE — 87040 BLOOD CULTURE FOR BACTERIA: CPT

## 2019-01-01 PROCEDURE — 99232 SBSQ HOSP IP/OBS MODERATE 35: CPT | Performed by: INTERNAL MEDICINE

## 2019-01-01 PROCEDURE — 700105 HCHG RX REV CODE 258: Performed by: NURSE PRACTITIONER

## 2019-01-01 PROCEDURE — 700101 HCHG RX REV CODE 250: Performed by: NURSE PRACTITIONER

## 2019-01-01 PROCEDURE — 96374 THER/PROPH/DIAG INJ IV PUSH: CPT

## 2019-01-01 PROCEDURE — 85610 PROTHROMBIN TIME: CPT | Mod: 91

## 2019-01-01 PROCEDURE — 700117 HCHG RX CONTRAST REV CODE 255: Performed by: SURGERY

## 2019-01-01 PROCEDURE — 700101 HCHG RX REV CODE 250: Performed by: INTERNAL MEDICINE

## 2019-01-01 PROCEDURE — 93010 ELECTROCARDIOGRAM REPORT: CPT | Performed by: INTERNAL MEDICINE

## 2019-01-01 PROCEDURE — C9132 KCENTRA, PER I.U.: HCPCS | Mod: JG | Performed by: NURSE PRACTITIONER

## 2019-01-01 PROCEDURE — 770022 HCHG ROOM/CARE - ICU (200)

## 2019-01-01 PROCEDURE — 71045 X-RAY EXAM CHEST 1 VIEW: CPT

## 2019-01-01 PROCEDURE — 85610 PROTHROMBIN TIME: CPT

## 2019-01-01 PROCEDURE — 93970 EXTREMITY STUDY: CPT

## 2019-01-01 PROCEDURE — 85730 THROMBOPLASTIN TIME PARTIAL: CPT | Mod: 91

## 2019-01-01 PROCEDURE — 85027 COMPLETE CBC AUTOMATED: CPT

## 2019-01-01 PROCEDURE — 86901 BLOOD TYPING SEROLOGIC RH(D): CPT

## 2019-01-01 PROCEDURE — 85384 FIBRINOGEN ACTIVITY: CPT

## 2019-01-01 PROCEDURE — 700105 HCHG RX REV CODE 258: Performed by: EMERGENCY MEDICINE

## 2019-01-01 PROCEDURE — 3E0234Z INTRODUCTION OF SERUM, TOXOID AND VACCINE INTO MUSCLE, PERCUTANEOUS APPROACH: ICD-10-PCS | Performed by: SURGERY

## 2019-01-01 PROCEDURE — 86850 RBC ANTIBODY SCREEN: CPT | Mod: 91

## 2019-01-01 PROCEDURE — 97166 OT EVAL MOD COMPLEX 45 MIN: CPT

## 2019-01-01 PROCEDURE — 82962 GLUCOSE BLOOD TEST: CPT | Mod: 91

## 2019-01-01 PROCEDURE — 97535 SELF CARE MNGMENT TRAINING: CPT

## 2019-01-01 PROCEDURE — 84484 ASSAY OF TROPONIN QUANT: CPT

## 2019-01-01 PROCEDURE — 85347 COAGULATION TIME ACTIVATED: CPT

## 2019-01-01 PROCEDURE — 3E0G76Z INTRODUCTION OF NUTRITIONAL SUBSTANCE INTO UPPER GI, VIA NATURAL OR ARTIFICIAL OPENING: ICD-10-PCS | Performed by: SURGERY

## 2019-01-01 PROCEDURE — 86901 BLOOD TYPING SEROLOGIC RH(D): CPT | Mod: 91

## 2019-01-01 PROCEDURE — 700111 HCHG RX REV CODE 636 W/ 250 OVERRIDE (IP): Performed by: EMERGENCY MEDICINE

## 2019-01-01 PROCEDURE — 770021 HCHG ROOM/CARE - ISO PRIVATE

## 2019-01-01 PROCEDURE — 99291 CRITICAL CARE FIRST HOUR: CPT

## 2019-01-01 PROCEDURE — 86850 RBC ANTIBODY SCREEN: CPT

## 2019-01-01 PROCEDURE — 70551 MRI BRAIN STEM W/O DYE: CPT

## 2019-01-01 PROCEDURE — 93010 ELECTROCARDIOGRAM REPORT: CPT | Mod: 77 | Performed by: INTERNAL MEDICINE

## 2019-01-01 PROCEDURE — 85576 BLOOD PLATELET AGGREGATION: CPT

## 2019-01-01 PROCEDURE — 96105 ASSESSMENT OF APHASIA: CPT

## 2019-01-01 PROCEDURE — 4A10X4Z MONITORING OF CENTRAL NERVOUS ELECTRICAL ACTIVITY, EXTERNAL APPROACH: ICD-10-PCS | Performed by: PSYCHIATRY & NEUROLOGY

## 2019-01-01 PROCEDURE — 90471 IMMUNIZATION ADMIN: CPT

## 2019-01-01 PROCEDURE — 304561 HCHG STAT O2

## 2019-01-01 PROCEDURE — 93306 TTE W/DOPPLER COMPLETE: CPT

## 2019-01-01 PROCEDURE — 93306 TTE W/DOPPLER COMPLETE: CPT | Mod: 26 | Performed by: INTERNAL MEDICINE

## 2019-01-01 RX ORDER — DIPHENHYDRAMINE HYDROCHLORIDE 50 MG/ML
50 INJECTION INTRAMUSCULAR; INTRAVENOUS ONCE
Status: COMPLETED | OUTPATIENT
Start: 2019-01-01 | End: 2019-01-01

## 2019-01-01 RX ORDER — SODIUM CHLORIDE, SODIUM LACTATE, POTASSIUM CHLORIDE, CALCIUM CHLORIDE 600; 310; 30; 20 MG/100ML; MG/100ML; MG/100ML; MG/100ML
1000 INJECTION, SOLUTION INTRAVENOUS ONCE
Status: DISCONTINUED | OUTPATIENT
Start: 2019-01-01 | End: 2019-01-01

## 2019-01-01 RX ORDER — LABETALOL HYDROCHLORIDE 5 MG/ML
10 INJECTION, SOLUTION INTRAVENOUS EVERY 4 HOURS PRN
Status: DISCONTINUED | OUTPATIENT
Start: 2019-01-01 | End: 2019-01-01

## 2019-01-01 RX ORDER — NICOTINE 21 MG/24HR
1 PATCH, TRANSDERMAL 24 HOURS TRANSDERMAL EVERY 24 HOURS
COMMUNITY

## 2019-01-01 RX ORDER — POTASSIUM CHLORIDE 7.45 MG/ML
10 INJECTION INTRAVENOUS
Status: COMPLETED | OUTPATIENT
Start: 2019-01-01 | End: 2019-01-01

## 2019-01-01 RX ORDER — POTASSIUM CHLORIDE 20 MEQ/1
40 TABLET, EXTENDED RELEASE ORAL ONCE
Status: COMPLETED | OUTPATIENT
Start: 2019-01-01 | End: 2019-01-01

## 2019-01-01 RX ORDER — PHYTONADIONE 10 MG/ML
10 INJECTION, EMULSION INTRAMUSCULAR; INTRAVENOUS; SUBCUTANEOUS ONCE
Status: COMPLETED | OUTPATIENT
Start: 2019-01-01 | End: 2019-01-01

## 2019-01-01 RX ORDER — ESOMEPRAZOLE MAGNESIUM 40 MG/1
40 CAPSULE, DELAYED RELEASE ORAL
Status: DISCONTINUED | OUTPATIENT
Start: 2019-01-01 | End: 2019-01-01

## 2019-01-01 RX ORDER — POTASSIUM CHLORIDE 20 MEQ/1
10 TABLET, EXTENDED RELEASE ORAL 2 TIMES DAILY
Status: DISCONTINUED | OUTPATIENT
Start: 2019-01-01 | End: 2019-01-01

## 2019-01-01 RX ORDER — DEXAMETHASONE SODIUM PHOSPHATE 4 MG/ML
4 INJECTION, SOLUTION INTRA-ARTICULAR; INTRALESIONAL; INTRAMUSCULAR; INTRAVENOUS; SOFT TISSUE
Status: DISCONTINUED | OUTPATIENT
Start: 2019-01-01 | End: 2019-01-01

## 2019-01-01 RX ORDER — BUTALBITAL, ACETAMINOPHEN AND CAFFEINE 50; 325; 40 MG/1; MG/1; MG/1
1 TABLET ORAL EVERY 6 HOURS PRN
Status: DISCONTINUED | OUTPATIENT
Start: 2019-01-01 | End: 2019-01-01

## 2019-01-01 RX ORDER — FUROSEMIDE 20 MG/1
20 TABLET ORAL DAILY
COMMUNITY

## 2019-01-01 RX ORDER — MAGNESIUM SULFATE HEPTAHYDRATE 40 MG/ML
2 INJECTION, SOLUTION INTRAVENOUS ONCE
Status: COMPLETED | OUTPATIENT
Start: 2019-01-01 | End: 2019-01-01

## 2019-01-01 RX ORDER — ALPRAZOLAM 0.5 MG/1
0.5 TABLET ORAL 2 TIMES DAILY PRN
Status: DISCONTINUED | OUTPATIENT
Start: 2019-01-01 | End: 2019-01-01

## 2019-01-01 RX ORDER — GLYCOPYRROLATE 0.2 MG/ML
0.2 INJECTION INTRAMUSCULAR; INTRAVENOUS EVERY 4 HOURS
Status: DISCONTINUED | OUTPATIENT
Start: 2019-01-01 | End: 2019-01-01 | Stop reason: HOSPADM

## 2019-01-01 RX ORDER — LORAZEPAM 2 MG/ML
2-4 INJECTION INTRAMUSCULAR
Status: DISCONTINUED | OUTPATIENT
Start: 2019-01-01 | End: 2019-01-01 | Stop reason: HOSPADM

## 2019-01-01 RX ORDER — SODIUM CHLORIDE, SODIUM LACTATE, POTASSIUM CHLORIDE, CALCIUM CHLORIDE 600; 310; 30; 20 MG/100ML; MG/100ML; MG/100ML; MG/100ML
INJECTION, SOLUTION INTRAVENOUS CONTINUOUS
Status: DISCONTINUED | OUTPATIENT
Start: 2019-01-01 | End: 2019-01-01

## 2019-01-01 RX ORDER — ACETAMINOPHEN 325 MG/1
650 TABLET ORAL EVERY 6 HOURS PRN
Status: DISCONTINUED | OUTPATIENT
Start: 2019-01-01 | End: 2019-01-01

## 2019-01-01 RX ORDER — DIPHENHYDRAMINE HCL 25 MG
50 TABLET ORAL ONCE
Status: DISCONTINUED | OUTPATIENT
Start: 2019-01-01 | End: 2019-01-01

## 2019-01-01 RX ORDER — ACETAMINOPHEN 650 MG/1
650 SUPPOSITORY RECTAL EVERY 4 HOURS PRN
Status: DISCONTINUED | OUTPATIENT
Start: 2019-01-01 | End: 2019-01-01 | Stop reason: HOSPADM

## 2019-01-01 RX ORDER — ATROPINE SULFATE 10 MG/ML
2 SOLUTION/ DROPS OPHTHALMIC EVERY 4 HOURS PRN
Status: DISCONTINUED | OUTPATIENT
Start: 2019-01-01 | End: 2019-01-01

## 2019-01-01 RX ORDER — AMIODARONE HYDROCHLORIDE 200 MG/1
400 TABLET ORAL 3 TIMES DAILY
Status: DISCONTINUED | OUTPATIENT
Start: 2019-01-01 | End: 2019-01-01

## 2019-01-01 RX ORDER — DULOXETIN HYDROCHLORIDE 60 MG/1
60 CAPSULE, DELAYED RELEASE ORAL DAILY
COMMUNITY

## 2019-01-01 RX ORDER — METHYLPREDNISOLONE SODIUM SUCCINATE 40 MG/ML
40 INJECTION, POWDER, LYOPHILIZED, FOR SOLUTION INTRAMUSCULAR; INTRAVENOUS
Status: COMPLETED | OUTPATIENT
Start: 2019-01-01 | End: 2019-01-01

## 2019-01-01 RX ORDER — POLYETHYLENE GLYCOL 3350 17 G/17G
1 POWDER, FOR SOLUTION ORAL 2 TIMES DAILY
Status: DISCONTINUED | OUTPATIENT
Start: 2019-01-01 | End: 2019-01-01

## 2019-01-01 RX ORDER — ACETAMINOPHEN 325 MG/1
650 TABLET ORAL EVERY 4 HOURS PRN
Status: DISCONTINUED | OUTPATIENT
Start: 2019-01-01 | End: 2019-01-01 | Stop reason: HOSPADM

## 2019-01-01 RX ORDER — ACETAMINOPHEN 325 MG/1
650 TABLET ORAL EVERY 4 HOURS PRN
Status: DISCONTINUED | OUTPATIENT
Start: 2019-01-01 | End: 2019-01-01

## 2019-01-01 RX ORDER — GLYCOPYRROLATE 1 MG/1
1 TABLET ORAL 3 TIMES DAILY PRN
Status: DISCONTINUED | OUTPATIENT
Start: 2019-01-01 | End: 2019-01-01

## 2019-01-01 RX ORDER — LEVETIRACETAM 250 MG/1
500 TABLET ORAL 2 TIMES DAILY
Status: COMPLETED | OUTPATIENT
Start: 2019-01-01 | End: 2019-01-01

## 2019-01-01 RX ORDER — LABETALOL HYDROCHLORIDE 5 MG/ML
10 INJECTION, SOLUTION INTRAVENOUS
Status: DISCONTINUED | OUTPATIENT
Start: 2019-01-01 | End: 2019-01-01

## 2019-01-01 RX ORDER — MAGNESIUM SULFATE 1 G/100ML
1 INJECTION INTRAVENOUS
Status: COMPLETED | OUTPATIENT
Start: 2019-01-01 | End: 2019-01-01

## 2019-01-01 RX ORDER — ONDANSETRON 4 MG/1
4 TABLET, ORALLY DISINTEGRATING ORAL EVERY 4 HOURS PRN
Status: DISCONTINUED | OUTPATIENT
Start: 2019-01-01 | End: 2019-01-01

## 2019-01-01 RX ORDER — POTASSIUM CHLORIDE 750 MG/1
10 TABLET, EXTENDED RELEASE ORAL 2 TIMES DAILY
COMMUNITY

## 2019-01-01 RX ORDER — BISACODYL 10 MG
10 SUPPOSITORY, RECTAL RECTAL
Status: DISCONTINUED | OUTPATIENT
Start: 2019-01-01 | End: 2019-01-01

## 2019-01-01 RX ORDER — ACETAMINOPHEN 650 MG/1
650 SUPPOSITORY RECTAL EVERY 4 HOURS PRN
Status: DISCONTINUED | OUTPATIENT
Start: 2019-01-01 | End: 2019-01-01

## 2019-01-01 RX ORDER — PREDNISONE 50 MG/1
50 TABLET ORAL EVERY 6 HOURS
Status: DISCONTINUED | OUTPATIENT
Start: 2019-01-01 | End: 2019-01-01

## 2019-01-01 RX ORDER — SODIUM CHLORIDE 9 MG/ML
INJECTION, SOLUTION INTRAVENOUS
Status: COMPLETED
Start: 2019-01-01 | End: 2019-01-01

## 2019-01-01 RX ORDER — CIPROFLOXACIN 500 MG/1
500 TABLET, FILM COATED ORAL EVERY 12 HOURS
Status: DISCONTINUED | OUTPATIENT
Start: 2019-01-01 | End: 2019-01-01

## 2019-01-01 RX ORDER — MORPHINE SULFATE 4 MG/ML
1-2 INJECTION, SOLUTION INTRAMUSCULAR; INTRAVENOUS
Status: DISCONTINUED | OUTPATIENT
Start: 2019-01-01 | End: 2019-01-01 | Stop reason: HOSPADM

## 2019-01-01 RX ORDER — SODIUM CHLORIDE 9 MG/ML
500 INJECTION, SOLUTION INTRAVENOUS ONCE
Status: COMPLETED | OUTPATIENT
Start: 2019-01-01 | End: 2019-01-01

## 2019-01-01 RX ORDER — TRIAMTERENE AND HYDROCHLOROTHIAZIDE 75; 50 MG/1; MG/1
0.5 TABLET ORAL 2 TIMES DAILY
Status: DISCONTINUED | OUTPATIENT
Start: 2019-01-01 | End: 2019-01-01

## 2019-01-01 RX ORDER — DULOXETIN HYDROCHLORIDE 30 MG/1
60 CAPSULE, DELAYED RELEASE ORAL DAILY
Status: DISCONTINUED | OUTPATIENT
Start: 2019-01-01 | End: 2019-01-01

## 2019-01-01 RX ORDER — FUROSEMIDE 20 MG/1
20 TABLET ORAL DAILY
Status: DISCONTINUED | OUTPATIENT
Start: 2019-01-01 | End: 2019-01-01

## 2019-01-01 RX ORDER — ESOMEPRAZOLE MAGNESIUM 40 MG/1
40 CAPSULE, DELAYED RELEASE ORAL
COMMUNITY

## 2019-01-01 RX ORDER — AMOXICILLIN 250 MG
1 CAPSULE ORAL
Status: DISCONTINUED | OUTPATIENT
Start: 2019-01-01 | End: 2019-01-01

## 2019-01-01 RX ORDER — PROCHLORPERAZINE MALEATE 5 MG/1
5 TABLET ORAL EVERY 6 HOURS PRN
Status: DISCONTINUED | OUTPATIENT
Start: 2019-01-01 | End: 2019-01-01

## 2019-01-01 RX ORDER — GUAIFENESIN 400 MG/1
400 TABLET ORAL EVERY 4 HOURS PRN
COMMUNITY

## 2019-01-01 RX ORDER — HYDRALAZINE HYDROCHLORIDE 20 MG/ML
10 INJECTION INTRAMUSCULAR; INTRAVENOUS
Status: DISCONTINUED | OUTPATIENT
Start: 2019-01-01 | End: 2019-01-01

## 2019-01-01 RX ORDER — OXYCODONE HYDROCHLORIDE 5 MG/1
2.5 TABLET ORAL
Status: DISCONTINUED | OUTPATIENT
Start: 2019-01-01 | End: 2019-01-01

## 2019-01-01 RX ORDER — POTASSIUM CHLORIDE 20 MEQ/1
40 TABLET, EXTENDED RELEASE ORAL 2 TIMES DAILY
Status: COMPLETED | OUTPATIENT
Start: 2019-01-01 | End: 2019-01-01

## 2019-01-01 RX ORDER — OXYCODONE HYDROCHLORIDE 5 MG/1
5 TABLET ORAL
Status: DISCONTINUED | OUTPATIENT
Start: 2019-01-01 | End: 2019-01-01

## 2019-01-01 RX ORDER — OMEPRAZOLE 20 MG/1
20 CAPSULE, DELAYED RELEASE ORAL DAILY
Status: DISCONTINUED | OUTPATIENT
Start: 2019-01-01 | End: 2019-01-01

## 2019-01-01 RX ORDER — DOCUSATE SODIUM 100 MG/1
100 CAPSULE, LIQUID FILLED ORAL 2 TIMES DAILY
Status: DISCONTINUED | OUTPATIENT
Start: 2019-01-01 | End: 2019-01-01

## 2019-01-01 RX ORDER — CLOPIDOGREL BISULFATE 75 MG/1
75 TABLET ORAL DAILY
Status: DISCONTINUED | OUTPATIENT
Start: 2019-01-01 | End: 2019-01-01

## 2019-01-01 RX ORDER — IPRATROPIUM BROMIDE AND ALBUTEROL SULFATE 2.5; .5 MG/3ML; MG/3ML
3 SOLUTION RESPIRATORY (INHALATION)
Status: DISCONTINUED | OUTPATIENT
Start: 2019-01-01 | End: 2019-01-01

## 2019-01-01 RX ORDER — ACETAMINOPHEN 500 MG
1000 TABLET ORAL EVERY 6 HOURS PRN
Status: DISCONTINUED | OUTPATIENT
Start: 2019-01-01 | End: 2019-01-01

## 2019-01-01 RX ORDER — LORAZEPAM 2 MG/ML
1 INJECTION INTRAMUSCULAR
Status: DISCONTINUED | OUTPATIENT
Start: 2019-01-01 | End: 2019-01-01 | Stop reason: HOSPADM

## 2019-01-01 RX ORDER — ENEMA 19; 7 G/133ML; G/133ML
1 ENEMA RECTAL
Status: DISCONTINUED | OUTPATIENT
Start: 2019-01-01 | End: 2019-01-01

## 2019-01-01 RX ORDER — QUETIAPINE FUMARATE 25 MG/1
25 TABLET, FILM COATED ORAL
COMMUNITY

## 2019-01-01 RX ORDER — MORPHINE SULFATE 10 MG/ML
5-10 INJECTION, SOLUTION INTRAMUSCULAR; INTRAVENOUS
Status: DISCONTINUED | OUTPATIENT
Start: 2019-01-01 | End: 2019-01-01 | Stop reason: HOSPADM

## 2019-01-01 RX ORDER — AMOXICILLIN 250 MG
1 CAPSULE ORAL NIGHTLY
Status: DISCONTINUED | OUTPATIENT
Start: 2019-01-01 | End: 2019-01-01

## 2019-01-01 RX ORDER — TRIAMTERENE AND HYDROCHLOROTHIAZIDE 75; 50 MG/1; MG/1
0.5 TABLET ORAL 2 TIMES DAILY
COMMUNITY

## 2019-01-01 RX ORDER — ACETAMINOPHEN 500 MG
1000 TABLET ORAL EVERY 6 HOURS
Status: DISCONTINUED | OUTPATIENT
Start: 2019-01-01 | End: 2019-01-01

## 2019-01-01 RX ORDER — QUETIAPINE FUMARATE 25 MG/1
25 TABLET, FILM COATED ORAL
Status: DISCONTINUED | OUTPATIENT
Start: 2019-01-01 | End: 2019-01-01

## 2019-01-01 RX ORDER — GLYCOPYRROLATE 0.2 MG/ML
0.2 INJECTION INTRAMUSCULAR; INTRAVENOUS 3 TIMES DAILY PRN
Status: DISCONTINUED | OUTPATIENT
Start: 2019-01-01 | End: 2019-01-01

## 2019-01-01 RX ORDER — CLOPIDOGREL BISULFATE 75 MG/1
75 TABLET ORAL DAILY
COMMUNITY

## 2019-01-01 RX ORDER — NITROFURANTOIN 25; 75 MG/1; MG/1
100 CAPSULE ORAL 2 TIMES DAILY WITH MEALS
Status: DISCONTINUED | OUTPATIENT
Start: 2019-01-01 | End: 2019-01-01

## 2019-01-01 RX ORDER — LORAZEPAM 2 MG/ML
1 CONCENTRATE ORAL
Status: DISCONTINUED | OUTPATIENT
Start: 2019-01-01 | End: 2019-01-01 | Stop reason: HOSPADM

## 2019-01-01 RX ORDER — ALPRAZOLAM 0.25 MG/1
0.25 TABLET ORAL
Status: DISCONTINUED | OUTPATIENT
Start: 2019-01-01 | End: 2019-01-01

## 2019-01-01 RX ORDER — METAXALONE 800 MG/1
800 TABLET ORAL 2 TIMES DAILY
Status: DISCONTINUED | OUTPATIENT
Start: 2019-01-01 | End: 2019-01-01

## 2019-01-01 RX ORDER — DEXTROSE MONOHYDRATE 50 MG/ML
INJECTION, SOLUTION INTRAVENOUS CONTINUOUS
Status: DISCONTINUED | OUTPATIENT
Start: 2019-01-01 | End: 2019-01-01

## 2019-01-01 RX ORDER — FAMOTIDINE 20 MG/1
20 TABLET, FILM COATED ORAL 2 TIMES DAILY
Status: DISCONTINUED | OUTPATIENT
Start: 2019-01-01 | End: 2019-01-01

## 2019-01-01 RX ORDER — DOCUSATE SODIUM 100 MG/1
100 CAPSULE, LIQUID FILLED ORAL DAILY
COMMUNITY

## 2019-01-01 RX ORDER — POLYVINYL ALCOHOL 14 MG/ML
2 SOLUTION/ DROPS OPHTHALMIC EVERY 6 HOURS PRN
Status: DISCONTINUED | OUTPATIENT
Start: 2019-01-01 | End: 2019-01-01 | Stop reason: HOSPADM

## 2019-01-01 RX ORDER — MORPHINE SULFATE 10 MG/ML
5 INJECTION, SOLUTION INTRAMUSCULAR; INTRAVENOUS
Status: DISCONTINUED | OUTPATIENT
Start: 2019-01-01 | End: 2019-01-01 | Stop reason: HOSPADM

## 2019-01-01 RX ORDER — SCOLOPAMINE TRANSDERMAL SYSTEM 1 MG/1
1 PATCH, EXTENDED RELEASE TRANSDERMAL
Status: DISCONTINUED | OUTPATIENT
Start: 2019-01-01 | End: 2019-01-01

## 2019-01-01 RX ORDER — IPRATROPIUM BROMIDE AND ALBUTEROL SULFATE 2.5; .5 MG/3ML; MG/3ML
3 SOLUTION RESPIRATORY (INHALATION) EVERY 4 HOURS PRN
Status: DISCONTINUED | OUTPATIENT
Start: 2019-01-01 | End: 2019-01-01

## 2019-01-01 RX ORDER — AMIODARONE HYDROCHLORIDE 200 MG/1
200 TABLET ORAL 3 TIMES DAILY
Status: DISCONTINUED | OUTPATIENT
Start: 2019-01-01 | End: 2019-01-01

## 2019-01-01 RX ORDER — ONDANSETRON 2 MG/ML
4 INJECTION INTRAMUSCULAR; INTRAVENOUS EVERY 4 HOURS PRN
Status: DISCONTINUED | OUTPATIENT
Start: 2019-01-01 | End: 2019-01-01

## 2019-01-01 RX ORDER — IPRATROPIUM BROMIDE AND ALBUTEROL SULFATE 2.5; .5 MG/3ML; MG/3ML
3 SOLUTION RESPIRATORY (INHALATION) EVERY 4 HOURS PRN
COMMUNITY

## 2019-01-01 RX ORDER — POLYETHYLENE GLYCOL 3350 17 G/17G
1 POWDER, FOR SOLUTION ORAL 2 TIMES DAILY PRN
Status: DISCONTINUED | OUTPATIENT
Start: 2019-01-01 | End: 2019-01-01

## 2019-01-01 RX ORDER — SODIUM CHLORIDE 9 MG/ML
1000 INJECTION, SOLUTION INTRAVENOUS ONCE
Status: COMPLETED | OUTPATIENT
Start: 2019-01-01 | End: 2019-01-01

## 2019-01-01 RX ORDER — MORPHINE SULFATE 4 MG/ML
1-2 INJECTION, SOLUTION INTRAMUSCULAR; INTRAVENOUS
Status: DISCONTINUED | OUTPATIENT
Start: 2019-01-01 | End: 2019-01-01

## 2019-01-01 RX ORDER — MORPHINE SULFATE 10 MG/ML
1-2 INJECTION, SOLUTION INTRAMUSCULAR; INTRAVENOUS
Status: DISCONTINUED | OUTPATIENT
Start: 2019-01-01 | End: 2019-01-01

## 2019-01-01 RX ADMIN — CLOPIDOGREL BISULFATE 75 MG: 75 TABLET ORAL at 05:20

## 2019-01-01 RX ADMIN — GLYCOPYRROLATE 0.2 MG: 0.2 INJECTION INTRAMUSCULAR; INTRAVENOUS at 06:02

## 2019-01-01 RX ADMIN — LORAZEPAM 1 MG: 2 INJECTION INTRAMUSCULAR; INTRAVENOUS at 21:41

## 2019-01-01 RX ADMIN — MORPHINE SULFATE 5 MG: 10 INJECTION INTRAVENOUS at 07:32

## 2019-01-01 RX ADMIN — GLYCOPYRROLATE 0.2 MG: 0.2 INJECTION INTRAMUSCULAR; INTRAVENOUS at 11:37

## 2019-01-01 RX ADMIN — LORAZEPAM 1 MG: 2 INJECTION INTRAMUSCULAR; INTRAVENOUS at 18:37

## 2019-01-01 RX ADMIN — LORAZEPAM 1 MG: 2 INJECTION INTRAMUSCULAR; INTRAVENOUS at 05:51

## 2019-01-01 RX ADMIN — ACETAMINOPHEN 1000 MG: 500 TABLET ORAL at 23:33

## 2019-01-01 RX ADMIN — MORPHINE SULFATE 5 MG: 10 INJECTION INTRAVENOUS at 05:11

## 2019-01-01 RX ADMIN — TRIAMTERENE AND HYDROCHLOROTHIAZIDE 0.5 TABLET: 75; 50 TABLET ORAL at 06:00

## 2019-01-01 RX ADMIN — GLYCOPYRROLATE 0.2 MG: 0.2 INJECTION INTRAMUSCULAR; INTRAVENOUS at 15:57

## 2019-01-01 RX ADMIN — ALPRAZOLAM 0.25 MG: 0.25 TABLET ORAL at 20:50

## 2019-01-01 RX ADMIN — DOCUSATE SODIUM 100 MG: 100 CAPSULE, LIQUID FILLED ORAL at 17:18

## 2019-01-01 RX ADMIN — AMIODARONE HYDROCHLORIDE 400 MG: 200 TABLET ORAL at 14:27

## 2019-01-01 RX ADMIN — CLOPIDOGREL BISULFATE 75 MG: 75 TABLET ORAL at 05:01

## 2019-01-01 RX ADMIN — OMEPRAZOLE 20 MG: 20 CAPSULE, DELAYED RELEASE ORAL at 05:20

## 2019-01-01 RX ADMIN — POTASSIUM CHLORIDE 10 MEQ: 1500 TABLET, EXTENDED RELEASE ORAL at 06:02

## 2019-01-01 RX ADMIN — SODIUM CHLORIDE 500 ML: 9 INJECTION, SOLUTION INTRAVENOUS at 15:02

## 2019-01-01 RX ADMIN — MORPHINE SULFATE 50 MG: 50 INJECTION, SOLUTION, CONCENTRATE INTRAVENOUS at 21:12

## 2019-01-01 RX ADMIN — TRIAMTERENE AND HYDROCHLOROTHIAZIDE 0.5 TABLET: 75; 50 TABLET ORAL at 18:11

## 2019-01-01 RX ADMIN — POTASSIUM CHLORIDE 40 MEQ: 1500 TABLET, EXTENDED RELEASE ORAL at 05:10

## 2019-01-01 RX ADMIN — MORPHINE SULFATE 2 MG: 4 INJECTION INTRAVENOUS at 05:18

## 2019-01-01 RX ADMIN — POTASSIUM CHLORIDE 10 MEQ: 1500 TABLET, EXTENDED RELEASE ORAL at 18:09

## 2019-01-01 RX ADMIN — MORPHINE SULFATE 1 MG: 10 INJECTION INTRAVENOUS at 16:31

## 2019-01-01 RX ADMIN — POLYETHYLENE GLYCOL 3350 1 PACKET: 17 POWDER, FOR SOLUTION ORAL at 06:03

## 2019-01-01 RX ADMIN — NITROFURANTOIN MONOHYDRATE/MACROCRYSTALLINE 100 MG: 25; 75 CAPSULE ORAL at 17:43

## 2019-01-01 RX ADMIN — GLYCOPYRROLATE 0.2 MG: 0.2 INJECTION INTRAMUSCULAR; INTRAVENOUS at 01:55

## 2019-01-01 RX ADMIN — LORAZEPAM 1 MG: 2 INJECTION INTRAMUSCULAR; INTRAVENOUS at 10:55

## 2019-01-01 RX ADMIN — POLYETHYLENE GLYCOL 3350 1 PACKET: 17 POWDER, FOR SOLUTION ORAL at 05:38

## 2019-01-01 RX ADMIN — LEVETIRACETAM 500 MG: 500 TABLET ORAL at 05:20

## 2019-01-01 RX ADMIN — MORPHINE SULFATE 5 MG: 10 INJECTION INTRAVENOUS at 15:23

## 2019-01-01 RX ADMIN — METOPROLOL TARTRATE 25 MG: 25 TABLET ORAL at 20:50

## 2019-01-01 RX ADMIN — MORPHINE SULFATE 2 MG: 10 INJECTION INTRAVENOUS at 00:22

## 2019-01-01 RX ADMIN — MORPHINE SULFATE 5 MG: 10 INJECTION INTRAVENOUS at 13:24

## 2019-01-01 RX ADMIN — LEVETIRACETAM 500 MG: 500 TABLET ORAL at 17:18

## 2019-01-01 RX ADMIN — ENOXAPARIN SODIUM 40 MG: 100 INJECTION SUBCUTANEOUS at 11:54

## 2019-01-01 RX ADMIN — LEVETIRACETAM 500 MG: 500 TABLET ORAL at 05:23

## 2019-01-01 RX ADMIN — POLYETHYLENE GLYCOL 3350 1 PACKET: 17 POWDER, FOR SOLUTION ORAL at 05:21

## 2019-01-01 RX ADMIN — POTASSIUM CHLORIDE 10 MEQ: 7.46 INJECTION, SOLUTION INTRAVENOUS at 12:34

## 2019-01-01 RX ADMIN — POLYETHYLENE GLYCOL 3350 1 PACKET: 17 POWDER, FOR SOLUTION ORAL at 17:58

## 2019-01-01 RX ADMIN — OMEPRAZOLE 20 MG: 20 CAPSULE, DELAYED RELEASE ORAL at 06:05

## 2019-01-01 RX ADMIN — MORPHINE SULFATE 2 MG: 4 INJECTION INTRAVENOUS at 16:47

## 2019-01-01 RX ADMIN — MAGNESIUM HYDROXIDE 30 ML: 400 SUSPENSION ORAL at 05:38

## 2019-01-01 RX ADMIN — NITROFURANTOIN MONOHYDRATE/MACROCRYSTALLINE 100 MG: 25; 75 CAPSULE ORAL at 13:03

## 2019-01-01 RX ADMIN — PROTHROMBIN, COAGULATION FACTOR VII HUMAN, COAGULATION FACTOR IX HUMAN, COAGULATION FACTOR X HUMAN, PROTEIN C, PROTEIN S HUMAN, AND WATER 1958 UNITS: KIT at 10:45

## 2019-01-01 RX ADMIN — SODIUM CHLORIDE, POTASSIUM CHLORIDE, SODIUM LACTATE AND CALCIUM CHLORIDE: 600; 310; 30; 20 INJECTION, SOLUTION INTRAVENOUS at 06:08

## 2019-01-01 RX ADMIN — METOPROLOL TARTRATE 25 MG: 25 TABLET ORAL at 17:42

## 2019-01-01 RX ADMIN — ALPRAZOLAM 0.25 MG: 0.25 TABLET ORAL at 21:17

## 2019-01-01 RX ADMIN — FUROSEMIDE 20 MG: 20 TABLET ORAL at 11:48

## 2019-01-01 RX ADMIN — OMEPRAZOLE 20 MG: 20 CAPSULE, DELAYED RELEASE ORAL at 05:10

## 2019-01-01 RX ADMIN — ENOXAPARIN SODIUM 40 MG: 100 INJECTION SUBCUTANEOUS at 06:00

## 2019-01-01 RX ADMIN — AMIODARONE HYDROCHLORIDE 400 MG: 200 TABLET ORAL at 17:42

## 2019-01-01 RX ADMIN — MORPHINE SULFATE 50 MG: 50 INJECTION, SOLUTION, CONCENTRATE INTRAVENOUS at 17:01

## 2019-01-01 RX ADMIN — LORAZEPAM 1 MG: 2 INJECTION INTRAMUSCULAR; INTRAVENOUS at 22:09

## 2019-01-01 RX ADMIN — CLOPIDOGREL BISULFATE 75 MG: 75 TABLET ORAL at 05:10

## 2019-01-01 RX ADMIN — SODIUM CHLORIDE 500 ML: 9 INJECTION, SOLUTION INTRAVENOUS at 09:45

## 2019-01-01 RX ADMIN — POTASSIUM CHLORIDE 40 MEQ: 1500 TABLET, EXTENDED RELEASE ORAL at 22:48

## 2019-01-01 RX ADMIN — MAGNESIUM SULFATE 2 G: 2 INJECTION INTRAVENOUS at 22:49

## 2019-01-01 RX ADMIN — ACETAMINOPHEN 1000 MG: 500 TABLET ORAL at 06:00

## 2019-01-01 RX ADMIN — LORAZEPAM 1 MG: 2 INJECTION INTRAMUSCULAR; INTRAVENOUS at 06:31

## 2019-01-01 RX ADMIN — LEVETIRACETAM 500 MG: 500 TABLET ORAL at 17:57

## 2019-01-01 RX ADMIN — TRIAMTERENE AND HYDROCHLOROTHIAZIDE 0.5 TABLET: 75; 50 TABLET ORAL at 11:49

## 2019-01-01 RX ADMIN — LORAZEPAM 1 MG: 2 INJECTION INTRAMUSCULAR; INTRAVENOUS at 00:33

## 2019-01-01 RX ADMIN — ALPRAZOLAM 0.25 MG: 0.25 TABLET ORAL at 21:10

## 2019-01-01 RX ADMIN — POTASSIUM PHOSPHATE, MONOBASIC AND POTASSIUM PHOSPHATE, DIBASIC 30 MMOL: 224; 236 INJECTION, SOLUTION, CONCENTRATE INTRAVENOUS at 13:37

## 2019-01-01 RX ADMIN — APIXABAN 5 MG: 5 TABLET, FILM COATED ORAL at 18:00

## 2019-01-01 RX ADMIN — METHYLPREDNISOLONE SODIUM SUCCINATE 40 MG: 40 INJECTION, POWDER, FOR SOLUTION INTRAMUSCULAR; INTRAVENOUS at 22:50

## 2019-01-01 RX ADMIN — ALPRAZOLAM 0.25 MG: 0.25 TABLET ORAL at 22:18

## 2019-01-01 RX ADMIN — PROCHLORPERAZINE EDISYLATE 5 MG: 5 INJECTION INTRAMUSCULAR; INTRAVENOUS at 08:51

## 2019-01-01 RX ADMIN — SODIUM CHLORIDE 500 ML: 9 INJECTION, SOLUTION INTRAVENOUS at 10:46

## 2019-01-01 RX ADMIN — MORPHINE SULFATE 50 MG: 50 INJECTION, SOLUTION, CONCENTRATE INTRAVENOUS at 02:41

## 2019-01-01 RX ADMIN — LORAZEPAM 1 MG: 2 INJECTION INTRAMUSCULAR; INTRAVENOUS at 01:02

## 2019-01-01 RX ADMIN — SENNOSIDES,DOCUSATE SODIUM 1 TABLET: 8.6; 5 TABLET, FILM COATED ORAL at 20:57

## 2019-01-01 RX ADMIN — SENNOSIDES,DOCUSATE SODIUM 1 TABLET: 8.6; 5 TABLET, FILM COATED ORAL at 20:39

## 2019-01-01 RX ADMIN — LORAZEPAM 1 MG: 2 INJECTION INTRAMUSCULAR; INTRAVENOUS at 03:47

## 2019-01-01 RX ADMIN — PROCHLORPERAZINE EDISYLATE 5 MG: 5 INJECTION INTRAMUSCULAR; INTRAVENOUS at 21:12

## 2019-01-01 RX ADMIN — PROCHLORPERAZINE EDISYLATE 5 MG: 5 INJECTION INTRAMUSCULAR; INTRAVENOUS at 07:57

## 2019-01-01 RX ADMIN — LEVETIRACETAM 500 MG: 500 TABLET ORAL at 16:49

## 2019-01-01 RX ADMIN — METHYLPREDNISOLONE SODIUM SUCCINATE 40 MG: 40 INJECTION, POWDER, FOR SOLUTION INTRAMUSCULAR; INTRAVENOUS at 20:30

## 2019-01-01 RX ADMIN — SODIUM CHLORIDE, POTASSIUM CHLORIDE, SODIUM LACTATE AND CALCIUM CHLORIDE: 600; 310; 30; 20 INJECTION, SOLUTION INTRAVENOUS at 12:02

## 2019-01-01 RX ADMIN — POTASSIUM CHLORIDE 10 MEQ: 1500 TABLET, EXTENDED RELEASE ORAL at 11:48

## 2019-01-01 RX ADMIN — DOCUSATE SODIUM 100 MG: 100 CAPSULE, LIQUID FILLED ORAL at 06:00

## 2019-01-01 RX ADMIN — FUROSEMIDE 20 MG: 20 TABLET ORAL at 06:00

## 2019-01-01 RX ADMIN — MORPHINE SULFATE 2 MG: 4 INJECTION INTRAVENOUS at 01:34

## 2019-01-01 RX ADMIN — SODIUM CHLORIDE 500 MG: 9 INJECTION, SOLUTION INTRAVENOUS at 18:57

## 2019-01-01 RX ADMIN — TRIAMTERENE AND HYDROCHLOROTHIAZIDE 0.5 TABLET: 75; 50 TABLET ORAL at 18:00

## 2019-01-01 RX ADMIN — POLYVINYL ALCOHOL 2 DROP: 14 SOLUTION/ DROPS OPHTHALMIC at 21:51

## 2019-01-01 RX ADMIN — LORAZEPAM 1 MG: 2 INJECTION INTRAMUSCULAR; INTRAVENOUS at 11:11

## 2019-01-01 RX ADMIN — MORPHINE SULFATE 5 MG: 10 INJECTION INTRAVENOUS at 10:03

## 2019-01-01 RX ADMIN — SODIUM CHLORIDE: 9 INJECTION, SOLUTION INTRAVENOUS at 23:00

## 2019-01-01 RX ADMIN — ALPRAZOLAM 0.25 MG: 0.25 TABLET ORAL at 20:57

## 2019-01-01 RX ADMIN — POLYVINYL ALCOHOL 2 DROP: 14 SOLUTION/ DROPS OPHTHALMIC at 05:54

## 2019-01-01 RX ADMIN — MORPHINE SULFATE 5 MG: 10 INJECTION INTRAVENOUS at 10:00

## 2019-01-01 RX ADMIN — LORAZEPAM 1 MG: 2 INJECTION INTRAMUSCULAR; INTRAVENOUS at 15:13

## 2019-01-01 RX ADMIN — POTASSIUM CHLORIDE 40 MEQ: 1500 TABLET, EXTENDED RELEASE ORAL at 13:08

## 2019-01-01 RX ADMIN — MORPHINE SULFATE 5 MG: 10 INJECTION INTRAVENOUS at 23:58

## 2019-01-01 RX ADMIN — SENNOSIDES,DOCUSATE SODIUM 1 TABLET: 8.6; 5 TABLET, FILM COATED ORAL at 20:23

## 2019-01-01 RX ADMIN — MAGNESIUM HYDROXIDE 30 ML: 400 SUSPENSION ORAL at 05:01

## 2019-01-01 RX ADMIN — LORAZEPAM 1 MG: 2 INJECTION INTRAMUSCULAR; INTRAVENOUS at 22:18

## 2019-01-01 RX ADMIN — AMIODARONE HYDROCHLORIDE 400 MG: 200 TABLET ORAL at 10:07

## 2019-01-01 RX ADMIN — SODIUM CHLORIDE 500 MG: 9 INJECTION, SOLUTION INTRAVENOUS at 06:01

## 2019-01-01 RX ADMIN — LEVETIRACETAM 500 MG: 500 TABLET ORAL at 05:39

## 2019-01-01 RX ADMIN — POLYVINYL ALCOHOL 2 DROP: 14 SOLUTION/ DROPS OPHTHALMIC at 21:19

## 2019-01-01 RX ADMIN — GLYCOPYRROLATE 0.2 MG: 0.2 INJECTION INTRAMUSCULAR; INTRAVENOUS at 21:14

## 2019-01-01 RX ADMIN — GLYCOPYRROLATE 0.2 MG: 0.2 INJECTION INTRAMUSCULAR; INTRAVENOUS at 07:34

## 2019-01-01 RX ADMIN — OMEPRAZOLE 20 MG: 20 CAPSULE, DELAYED RELEASE ORAL at 05:23

## 2019-01-01 RX ADMIN — ALPRAZOLAM 0.25 MG: 0.25 TABLET ORAL at 22:15

## 2019-01-01 RX ADMIN — POLYVINYL ALCOHOL 2 DROP: 14 SOLUTION/ DROPS OPHTHALMIC at 12:05

## 2019-01-01 RX ADMIN — APIXABAN 5 MG: 5 TABLET, FILM COATED ORAL at 05:39

## 2019-01-01 RX ADMIN — OMEPRAZOLE 20 MG: 20 CAPSULE, DELAYED RELEASE ORAL at 05:38

## 2019-01-01 RX ADMIN — SODIUM CHLORIDE, POTASSIUM CHLORIDE, SODIUM LACTATE AND CALCIUM CHLORIDE: 600; 310; 30; 20 INJECTION, SOLUTION INTRAVENOUS at 10:01

## 2019-01-01 RX ADMIN — BUTALBITAL, ACETAMINOPHEN, AND CAFFEINE 1 TABLET: 50; 325; 40 TABLET ORAL at 17:58

## 2019-01-01 RX ADMIN — MORPHINE SULFATE 2 MG: 4 INJECTION INTRAVENOUS at 19:59

## 2019-01-01 RX ADMIN — LORAZEPAM 1 MG: 2 INJECTION INTRAMUSCULAR; INTRAVENOUS at 12:06

## 2019-01-01 RX ADMIN — MORPHINE SULFATE 5 MG: 10 INJECTION INTRAVENOUS at 18:11

## 2019-01-01 RX ADMIN — PHYTONADIONE 10 MG: 10 INJECTION, EMULSION INTRAMUSCULAR; INTRAVENOUS; SUBCUTANEOUS at 10:45

## 2019-01-01 RX ADMIN — MAGNESIUM SULFATE IN DEXTROSE 1 G: 10 INJECTION, SOLUTION INTRAVENOUS at 14:00

## 2019-01-01 RX ADMIN — LORAZEPAM 1 MG: 2 INJECTION INTRAMUSCULAR; INTRAVENOUS at 14:06

## 2019-01-01 RX ADMIN — DULOXETINE HYDROCHLORIDE 60 MG: 30 CAPSULE, DELAYED RELEASE ORAL at 11:48

## 2019-01-01 RX ADMIN — LORAZEPAM 1 MG: 2 INJECTION INTRAMUSCULAR; INTRAVENOUS at 18:24

## 2019-01-01 RX ADMIN — POLYVINYL ALCOHOL 2 DROP: 14 SOLUTION/ DROPS OPHTHALMIC at 05:13

## 2019-01-01 RX ADMIN — LEVETIRACETAM 500 MG: 500 TABLET ORAL at 06:04

## 2019-01-01 RX ADMIN — FAMOTIDINE 20 MG: 20 TABLET ORAL at 18:57

## 2019-01-01 RX ADMIN — SODIUM CHLORIDE 500 ML: 9 INJECTION, SOLUTION INTRAVENOUS at 11:41

## 2019-01-01 RX ADMIN — SODIUM CHLORIDE, POTASSIUM CHLORIDE, SODIUM LACTATE AND CALCIUM CHLORIDE: 600; 310; 30; 20 INJECTION, SOLUTION INTRAVENOUS at 12:00

## 2019-01-01 RX ADMIN — POTASSIUM CHLORIDE 10 MEQ: 7.46 INJECTION, SOLUTION INTRAVENOUS at 13:51

## 2019-01-01 RX ADMIN — CLOPIDOGREL BISULFATE 75 MG: 75 TABLET ORAL at 05:39

## 2019-01-01 RX ADMIN — SENNOSIDES,DOCUSATE SODIUM 1 TABLET: 8.6; 5 TABLET, FILM COATED ORAL at 22:16

## 2019-01-01 RX ADMIN — POLYETHYLENE GLYCOL 3350 1 PACKET: 17 POWDER, FOR SOLUTION ORAL at 05:02

## 2019-01-01 RX ADMIN — ACETAMINOPHEN 1000 MG: 500 TABLET ORAL at 18:57

## 2019-01-01 RX ADMIN — LORAZEPAM 1 MG: 2 INJECTION INTRAMUSCULAR; INTRAVENOUS at 07:29

## 2019-01-01 RX ADMIN — METOPROLOL TARTRATE 25 MG: 25 TABLET ORAL at 15:19

## 2019-01-01 RX ADMIN — ALPRAZOLAM 0.25 MG: 0.25 TABLET ORAL at 21:13

## 2019-01-01 RX ADMIN — POLYETHYLENE GLYCOL 3350 1 PACKET: 17 POWDER, FOR SOLUTION ORAL at 17:18

## 2019-01-01 RX ADMIN — APIXABAN 5 MG: 5 TABLET, FILM COATED ORAL at 05:02

## 2019-01-01 RX ADMIN — IOHEXOL 80 ML: 350 INJECTION, SOLUTION INTRAVENOUS at 00:41

## 2019-01-01 RX ADMIN — MORPHINE SULFATE 5 MG: 10 INJECTION INTRAVENOUS at 15:53

## 2019-01-01 RX ADMIN — SENNOSIDES,DOCUSATE SODIUM 1 TABLET: 8.6; 5 TABLET, FILM COATED ORAL at 20:36

## 2019-01-01 RX ADMIN — GLYCOPYRROLATE 0.2 MG: 0.2 INJECTION INTRAMUSCULAR; INTRAVENOUS at 16:28

## 2019-01-01 RX ADMIN — POTASSIUM CHLORIDE 40 MEQ: 1500 TABLET, EXTENDED RELEASE ORAL at 14:27

## 2019-01-01 RX ADMIN — DOCUSATE SODIUM 100 MG: 100 CAPSULE, LIQUID FILLED ORAL at 18:57

## 2019-01-01 RX ADMIN — GLYCOPYRROLATE 0.2 MG: 0.2 INJECTION INTRAMUSCULAR; INTRAVENOUS at 21:21

## 2019-01-01 RX ADMIN — PROTHROMBIN, COAGULATION FACTOR VII HUMAN, COAGULATION FACTOR IX HUMAN, COAGULATION FACTOR X HUMAN, PROTEIN C, PROTEIN S HUMAN, AND WATER 1622 UNITS: KIT at 12:50

## 2019-01-01 RX ADMIN — MAGNESIUM HYDROXIDE 30 ML: 400 SUSPENSION ORAL at 12:34

## 2019-01-01 RX ADMIN — SODIUM CHLORIDE, POTASSIUM CHLORIDE, SODIUM LACTATE AND CALCIUM CHLORIDE: 600; 310; 30; 20 INJECTION, SOLUTION INTRAVENOUS at 10:56

## 2019-01-01 RX ADMIN — APIXABAN 5 MG: 5 TABLET, FILM COATED ORAL at 05:24

## 2019-01-01 RX ADMIN — MAGNESIUM HYDROXIDE 30 ML: 400 SUSPENSION ORAL at 05:20

## 2019-01-01 RX ADMIN — CLOPIDOGREL BISULFATE 75 MG: 75 TABLET ORAL at 05:23

## 2019-01-01 RX ADMIN — MORPHINE SULFATE 2 MG: 4 INJECTION INTRAVENOUS at 07:59

## 2019-01-01 RX ADMIN — SODIUM CHLORIDE 1000 ML: 9 INJECTION, SOLUTION INTRAVENOUS at 10:42

## 2019-01-01 RX ADMIN — DIPHENHYDRAMINE HYDROCHLORIDE 50 MG: 50 INJECTION INTRAMUSCULAR; INTRAVENOUS at 22:50

## 2019-01-01 RX ADMIN — MAGNESIUM SULFATE IN DEXTROSE 1 G: 10 INJECTION, SOLUTION INTRAVENOUS at 13:00

## 2019-01-01 RX ADMIN — MORPHINE SULFATE 5 MG: 10 INJECTION INTRAVENOUS at 02:31

## 2019-01-01 RX ADMIN — MORPHINE SULFATE 50 MG: 50 INJECTION, SOLUTION, CONCENTRATE INTRAVENOUS at 11:18

## 2019-01-01 RX ADMIN — MAGNESIUM SULFATE 2 G: 2 INJECTION INTRAVENOUS at 12:34

## 2019-01-01 RX ADMIN — OMEPRAZOLE 20 MG: 20 CAPSULE, DELAYED RELEASE ORAL at 05:01

## 2019-01-01 RX ADMIN — HUMAN ALBUMIN MICROSPHERES AND PERFLUTREN 3 ML: 10; .22 INJECTION, SOLUTION INTRAVENOUS at 15:53

## 2019-01-01 RX ADMIN — CLOPIDOGREL BISULFATE 75 MG: 75 TABLET ORAL at 06:04

## 2019-01-01 RX ADMIN — FAMOTIDINE 20 MG: 20 TABLET ORAL at 06:01

## 2019-01-01 RX ADMIN — POTASSIUM CHLORIDE 10 MEQ: 1500 TABLET, EXTENDED RELEASE ORAL at 17:57

## 2019-01-01 RX ADMIN — LEVETIRACETAM 500 MG: 500 TABLET ORAL at 05:01

## 2019-01-01 RX ADMIN — PROCHLORPERAZINE MALEATE 5 MG: 5 TABLET, FILM COATED ORAL at 05:56

## 2019-01-01 RX ADMIN — POTASSIUM CHLORIDE 40 MEQ: 1500 TABLET, EXTENDED RELEASE ORAL at 17:18

## 2019-01-01 RX ADMIN — MAGNESIUM SULFATE IN DEXTROSE 1 G: 10 INJECTION, SOLUTION INTRAVENOUS at 12:03

## 2019-01-01 RX ADMIN — OMEPRAZOLE 20 MG: 20 CAPSULE, DELAYED RELEASE ORAL at 11:48

## 2019-01-01 RX ADMIN — METOPROLOL TARTRATE 25 MG: 25 TABLET ORAL at 22:15

## 2019-01-01 RX ADMIN — LEVETIRACETAM 500 MG: 500 TABLET ORAL at 17:58

## 2019-01-01 RX ADMIN — LEVETIRACETAM 500 MG: 500 TABLET ORAL at 18:09

## 2019-01-01 RX ADMIN — CLOPIDOGREL BISULFATE 75 MG: 75 TABLET ORAL at 11:48

## 2019-01-01 RX ADMIN — MORPHINE SULFATE 50 MG: 50 INJECTION, SOLUTION, CONCENTRATE INTRAVENOUS at 06:00

## 2019-01-01 RX ADMIN — LEVETIRACETAM 500 MG: 500 TABLET ORAL at 17:51

## 2019-01-01 RX ADMIN — DOCUSATE SODIUM 100 MG: 100 CAPSULE, LIQUID FILLED ORAL at 18:00

## 2019-01-01 RX ADMIN — PNEUMOCOCCAL 13-VALENT CONJUGATE VACCINE 0.5 ML: 2.2; 2.2; 2.2; 2.2; 2.2; 4.4; 2.2; 2.2; 2.2; 2.2; 2.2; 2.2; 2.2 INJECTION, SUSPENSION INTRAMUSCULAR at 18:09

## 2019-01-01 RX ADMIN — POLYVINYL ALCOHOL 2 DROP: 14 SOLUTION/ DROPS OPHTHALMIC at 14:15

## 2019-01-01 RX ADMIN — LEVETIRACETAM 500 MG: 500 TABLET ORAL at 05:11

## 2019-01-01 RX ADMIN — MORPHINE SULFATE 5 MG: 10 INJECTION INTRAVENOUS at 11:05

## 2019-01-01 ASSESSMENT — ENCOUNTER SYMPTOMS
HEADACHES: 0
ABDOMINAL PAIN: 0
SENSORY CHANGE: 0
ABDOMINAL PAIN: 0
FEVER: 0
SHORTNESS OF BREATH: 0
SENSORY CHANGE: 0
ABDOMINAL PAIN: 0
VOMITING: 0
HEADACHES: 1
DIZZINESS: 0
SPEECH CHANGE: 0
TINGLING: 0
SHORTNESS OF BREATH: 0
FEVER: 0
NAUSEA: 0
VOMITING: 0
CHILLS: 0
VOMITING: 0
FOCAL WEAKNESS: 0
MYALGIAS: 0
ABDOMINAL PAIN: 0
SENSORY CHANGE: 0
FEVER: 0
CHILLS: 0
HEADACHES: 0
SHORTNESS OF BREATH: 0
NAUSEA: 0
FEVER: 0
FOCAL WEAKNESS: 0
CHILLS: 0
SENSORY CHANGE: 0
FEVER: 0
CHILLS: 0
FEVER: 0
FEVER: 0
FOCAL WEAKNESS: 0
NAUSEA: 0
DIZZINESS: 1
DOUBLE VISION: 0
HEADACHES: 0
SENSORY CHANGE: 0
MYALGIAS: 0
FEVER: 0
VOMITING: 0
NAUSEA: 0
NAUSEA: 0
FOCAL WEAKNESS: 0
ROS GI COMMENTS: LAST BOWEL MOVEMENT PTA
HEADACHES: 0
MYALGIAS: 0
FEVER: 0
ABDOMINAL PAIN: 0
SHORTNESS OF BREATH: 0
VOMITING: 0
MYALGIAS: 0
BLURRED VISION: 0
MYALGIAS: 1
NECK PAIN: 0
CHILLS: 0
FOCAL WEAKNESS: 0
SHORTNESS OF BREATH: 0
BACK PAIN: 0

## 2019-01-01 ASSESSMENT — GAIT ASSESSMENTS
ASSISTIVE DEVICE: FRONT WHEEL WALKER
DEVIATION: BRADYKINETIC
DISTANCE (FEET): 150
DISTANCE (FEET): 75
GAIT LEVEL OF ASSIST: MINIMAL ASSIST
GAIT LEVEL OF ASSIST: SUPERVISED

## 2019-01-01 ASSESSMENT — COGNITIVE AND FUNCTIONAL STATUS - GENERAL
DRESSING REGULAR UPPER BODY CLOTHING: A LITTLE
CLIMB 3 TO 5 STEPS WITH RAILING: A LITTLE
EATING MEALS: A LITTLE
TOILETING: A LITTLE
HELP NEEDED FOR BATHING: A LITTLE
CLIMB 3 TO 5 STEPS WITH RAILING: A LITTLE
WALKING IN HOSPITAL ROOM: A LITTLE
MOVING TO AND FROM BED TO CHAIR: A LITTLE
DRESSING REGULAR UPPER BODY CLOTHING: A LITTLE
MOBILITY SCORE: 18
TURNING FROM BACK TO SIDE WHILE IN FLAT BAD: A LITTLE
SUGGESTED CMS G CODE MODIFIER MOBILITY: CK
SUGGESTED CMS G CODE MODIFIER DAILY ACTIVITY: CK
SUGGESTED CMS G CODE MODIFIER DAILY ACTIVITY: CK
DRESSING REGULAR LOWER BODY CLOTHING: A LITTLE
SUGGESTED CMS G CODE MODIFIER MOBILITY: CK
STANDING UP FROM CHAIR USING ARMS: A LITTLE
MOVING FROM LYING ON BACK TO SITTING ON SIDE OF FLAT BED: A LITTLE
DAILY ACTIVITIY SCORE: 18
WALKING IN HOSPITAL ROOM: A LITTLE
TOILETING: A LITTLE
MOBILITY SCORE: 18
PERSONAL GROOMING: A LITTLE
MOVING FROM LYING ON BACK TO SITTING ON SIDE OF FLAT BED: A LITTLE
TURNING FROM BACK TO SIDE WHILE IN FLAT BAD: A LITTLE
HELP NEEDED FOR BATHING: A LOT
MOBILITY SCORE: 18
MOVING FROM LYING ON BACK TO SITTING ON SIDE OF FLAT BED: A LITTLE
MOVING TO AND FROM BED TO CHAIR: A LITTLE
PERSONAL GROOMING: A LITTLE
STANDING UP FROM CHAIR USING ARMS: A LITTLE
DRESSING REGULAR UPPER BODY CLOTHING: A LITTLE
HELP NEEDED FOR BATHING: A LITTLE
SUGGESTED CMS G CODE MODIFIER MOBILITY: CK
DRESSING REGULAR LOWER BODY CLOTHING: A LITTLE
WALKING IN HOSPITAL ROOM: A LITTLE
PERSONAL GROOMING: A LITTLE
DAILY ACTIVITIY SCORE: 17
TURNING FROM BACK TO SIDE WHILE IN FLAT BAD: A LITTLE
CLIMB 3 TO 5 STEPS WITH RAILING: A LITTLE
MOVING TO AND FROM BED TO CHAIR: A LITTLE
STANDING UP FROM CHAIR USING ARMS: A LITTLE
TOILETING: A LITTLE
EATING MEALS: A LITTLE
DAILY ACTIVITIY SCORE: 18
DRESSING REGULAR LOWER BODY CLOTHING: A LOT
SUGGESTED CMS G CODE MODIFIER DAILY ACTIVITY: CK

## 2019-01-01 ASSESSMENT — PATIENT HEALTH QUESTIONNAIRE - PHQ9
1. LITTLE INTEREST OR PLEASURE IN DOING THINGS: NOT AT ALL
2. FEELING DOWN, DEPRESSED, IRRITABLE, OR HOPELESS: NOT AT ALL
2. FEELING DOWN, DEPRESSED, IRRITABLE, OR HOPELESS: NOT AT ALL
SUM OF ALL RESPONSES TO PHQ9 QUESTIONS 1 AND 2: 0
2. FEELING DOWN, DEPRESSED, IRRITABLE, OR HOPELESS: NOT AT ALL

## 2019-01-01 ASSESSMENT — ACTIVITIES OF DAILY LIVING (ADL): TOILETING: INDEPENDENT

## 2019-01-01 ASSESSMENT — LIFESTYLE VARIABLES
SUBSTANCE_ABUSE: 0
ALCOHOL_USE: NO
EVER_SMOKED: YES

## 2019-06-13 PROBLEM — Z53.09 CONTRAINDICATION TO DEEP VEIN THROMBOSIS (DVT) PROPHYLAXIS: Status: ACTIVE | Noted: 2019-01-01

## 2019-06-13 PROBLEM — M19.90 ARTHRITIS: Status: ACTIVE | Noted: 2019-01-01

## 2019-06-13 PROBLEM — I50.9 CHF (CONGESTIVE HEART FAILURE) (HCC): Status: ACTIVE | Noted: 2019-01-01

## 2019-06-13 PROBLEM — E87.6 HYPOKALEMIA: Status: ACTIVE | Noted: 2019-01-01

## 2019-06-13 PROBLEM — K21.9 GERD (GASTROESOPHAGEAL REFLUX DISEASE): Status: ACTIVE | Noted: 2019-01-01

## 2019-06-13 PROBLEM — T14.90XA TRAUMA: Status: ACTIVE | Noted: 2019-01-01

## 2019-06-13 PROBLEM — I10 ESSENTIAL HYPERTENSION: Status: ACTIVE | Noted: 2019-01-01

## 2019-06-13 PROBLEM — F32.A DEPRESSION: Status: ACTIVE | Noted: 2019-01-01

## 2019-06-13 PROBLEM — Z79.01 ANTICOAGULATED: Status: ACTIVE | Noted: 2019-01-01

## 2019-06-13 PROBLEM — S06.6X1A TRAUMATIC SUBARACHNOID HEMORRHAGE WITH LOSS OF CONSCIOUSNESS OF 30 MINUTES OR LESS (HCC): Status: ACTIVE | Noted: 2019-01-01

## 2019-06-13 PROBLEM — I25.2 HISTORY OF MI (MYOCARDIAL INFARCTION): Status: ACTIVE | Noted: 2019-01-01

## 2019-06-13 PROBLEM — N39.0 UTI (URINARY TRACT INFECTION): Status: ACTIVE | Noted: 2019-01-01

## 2019-06-13 PROBLEM — R60.9 SWELLING: Status: ACTIVE | Noted: 2019-01-01

## 2019-06-13 PROBLEM — R55 SYNCOPE: Status: ACTIVE | Noted: 2019-01-01

## 2019-06-13 PROBLEM — Z01.89 ENCOUNTER FOR GERIATRIC ASSESSMENT: Status: ACTIVE | Noted: 2019-01-01

## 2019-06-13 NOTE — ED NOTES
Patient back from CT scan via gurney on monitor and oxygen with nurse and tech  Cardiac monitor showing sinus rhythm with pacs, pvc.  Patient denies any chest pain at this tme  She is oriented to person only  Blood pressure stable

## 2019-06-13 NOTE — CONSULTS
Paged 945 am  78F   Fall on plavix/eliqus  GCS 13-15 per ED  CT- tSAH    Plan  1. Reverse eliquis  2. Stat TEG and possible platelets  3. Admit [facility] under trauma.

## 2019-06-13 NOTE — H&P
TRAUMA HISTORY AND PHYSICAL    CHIEF COMPLAINT: GLF with head injury and intracranial hemorrhage    HISTORY OF PRESENT ILLNESS: The patient is a 78 year old   Female who experienced a syncopal episode and fell.  She struck her head.  Brief LOC.  Evaluated at [facility] where CT showed a subarachnoid hemorrhage.    Recent cardiac stents, she is on Eliquis. And Plavix.   Kcentra and vit. K at [facility].    The patient was triaged as a trauma red    activation in accordance with established pre hospital protocols.  Upon arrival,   primary and secondary surveys with required adjuncts were performed.  Arrived here with GCS 14.  Neurosurgery has reviewed images.  Admit sicu.  TEG is pending to evaluate platelet function.        PAST MEDICAL HISTORY:  has a past medical history of Arthritis; Depression; GERD (gastroesophageal reflux disease); Hypokalemia; and Swelling.     PAST SURGICAL HISTORY:  has a past surgical history that includes appendectomy; hemorrhoidectomy; and cholecystectomy.     ALLERGIES:   Allergies   Allergen Reactions   • Lipitor [Atorvastatin Calcium]      And related meds   • Pcn [Penicillins] Hives and Swelling   • Sulfa Drugs Hives and Swelling        CURRENT MEDICATIONS:   Home Medications     Reviewed by Amanda De Leon (Pharmacy Tech) on 06/13/19 at 1143  Med List Status: Complete   Medication Last Dose Status   ALPRAZolam (XANAX) 1 MG Tab 6/12/2019 Active   apixaban (ELIQUIS) 5mg Tab 6/12/2019 Active   clopidogrel (PLAVIX) 75 MG Tab 6/12/2019 Active   docusate sodium (COLACE) 100 MG Cap 6/12/2019 Active   DULoxetine (CYMBALTA) 60 MG Cap DR Particles delayed-release capsule 6/12/2019 Active   esomeprazole (NEXIUM) 40 MG delayed-release capsule 6/12/2019 Active   furosemide (LASIX) 20 MG Tab 6/12/2019 Active   Guaifenesin 400 MG Tab PRN Active   ipratropium-albuterol (DUONEB) 0.5-2.5 (3) MG/3ML nebulizer solution 6/12/2019 Active   nicotine (NICODERM) 14 MG/24HR PATCH 24 HR 6/12/2019 Active  "  potassium chloride SA (K-DUR) 10 MEQ Tab CR 6/12/2019 Active   QUEtiapine (SEROQUEL) 25 MG Tab 6/12/2019 Active   triamterene-hydrochlorothiazide (MAXZIDE 75-50) 75-50 MG Tab 6/12/2019 Active   vitamin D (CHOLECALCIFEROL) 1000 UNIT Tab 6/12/2019 Active                FAMILY HISTORY: No family history on file.     SOCIAL HISTORY:   Social History     Social History Main Topics   • Smoking status: Current Every Day Smoker   • Smokeless tobacco: Not on file   • Alcohol use Yes   • Drug use: No   • Sexual activity: Not on file       REVIEW OF SYSTEMS: Comprehensive review of systems isnot possible due to mental status  PHYSICAL EXAMINATION:     GENERAL: No distress  VITAL SIGNS: /61   Pulse 76   Temp 36.2 °C (97.2 °F) (Temporal)   Resp (!) 22   Ht 1.791 m (5' 10.5\")   Wt 71.4 kg (157 lb 6.5 oz)   SpO2 100%   HEAD AND NECK: Pupils:  Equal and Reactive  Dentition: Occlusion is adequate  Facial:  Symmetrical.    NECK: No JVD. Trachea midline. Cervical tenderness is  absent   CHEST: Breath sounds are equal. No sternal tenderness.  No  lateral rib tenderness.  CARDIOVASCULAR: Regular rhythm  ABDOMEN: Soft, no tenderness guarding or peritoneal findings.   PELVIS: Stable.  EXTREMITIES: Examination of the upper and lower extremities : No gross long bone or joint deformity.    BACK: No midline stepoffs.  No Tenderness  NEUROLOGIC: [place] Coma Score is 14 .  No gross motor or sensory deficit.     LABORATORY VALUES:   Recent Labs      06/13/19   1017  06/13/19   1125   WBC  10.9*  15.8*   RBC  4.55  4.54   HEMOGLOBIN  13.8  13.9   HEMATOCRIT  40.3  41.2   MCV  88.6  90.7   MCH  30.3  30.6   MCHC  34.2  33.7   RDW  46.7  48.4   PLATELETCT  333  330   MPV  10.3  10.6     Recent Labs      06/13/19   1017  06/13/19   1125   SODIUM  136  137   POTASSIUM  2.5*  2.9*   CHLORIDE  94*  98   CO2  29  27   GLUCOSE  120*  113*   BUN  23*  22   CREATININE  1.23  1.17   CALCIUM  8.9  9.0     Recent Labs      06/13/19   1017  " 06/13/19   1125   ASTSGOT  25  23   ALTSGPT  13  11   TBILIRUBIN  1.5  1.2   ALKPHOSPHAT  80  84   GLOBULIN  3.2  3.4   INR  1.31*  1.15*     Recent Labs      06/13/19   1017  06/13/19   1125   APTT  30.1  30.0   INR  1.31*  1.15*        IMAGING:   US-ABORTED US PROCEDURE    (Results Pending)       IMPRESSION AND PLAN:  Hypokalemia  Admission serum potassium 2.5.  Empiric magnesium and phosphorus replacement.  Replete and trend.    History of MI (myocardial infarction)  Recently hospitalized at [facility] for MI.  Stent placement.  Discharge on Plavix and Eliquis.  Referring facility troponin 0.05.  Repeat troponin ordered.    Syncope  Syncope at home and GLF.  EKG from referring facility with atrial fibrillation; ventricular tachycardia, unsustained; left bundle branch block.  Repeat EKG pending.    Anticoagulated  Plavix and Eliquis as outpatient.  Received KCentra 1958 units and Vitamin K 10 mg at referring facility.  Insufficent does of KCentra at referring facility, additional dose given in ICU.  TEG with platelet mapping pending.  Hold off on platelet transfusion until TEG resulted.    Depression  Chronic condition treated with Xanax.  Resumed maintenance medication.    Contraindication to deep vein thrombosis (DVT) prophylaxis  Systemic anticoagulation contraindicated secondary to elevated bleeding risk.  6/14 Surveillance venous duplex scanning ordered.    Traumatic subarachnoid hemorrhage with loss of consciousness of 30 minutes or less (HCC)  Acute subarachnoid hemorrhage is moderate volume with no intra-axial hematoma or hydrocephalus. No significant mass effect.  Definitive plan pending.  Post traumatic pharmacologic seizure prophylaxis for 1 week.  Speech Language Pathology cognitive evaluation.  Honey Ferguson MD. Neurosurgery.    Trauma  Syncope and GLF while on blood thinners. Seen at [facility].  Trauma Red Transfer Activation.  Tyler Barrios MD. Trauma Surgery.    Encounter for  geriatric assessment  6/13 Consult placed.    UTI (urinary tract infection)  Discharged from [facility] on Cefdinir 300 mg BID for 3 days (completed 6/11).    Depression  Chronic condition treated with Cymbalta 60 mg daily.    CHF (congestive heart failure) (HCC)  Chronic condition treated with Lasix 20 mg daily.    Critical Care 35 minutes including bedside , review of images and consultation with other physicians.  At risk for progression of intracranial hemorrhage and subsequent neurologic catastrophe.      ____________________________________   Tyler Barrios MD, FACS      DD: 6/13/2019   DT: 12:58 PM

## 2019-06-13 NOTE — CARE PLAN
Problem: Skin Integrity  Goal: Skin Integrity is maintained or improved  Turn q 2 hr while in bed, low air loss mattress, check devices q shift, mepilex bony prominences.

## 2019-06-13 NOTE — ED PROVIDER NOTES
ED Provider Note    Scribed for José Luis Mg M.D. by Christa Delgado. 6/13/2019  11:32 AM    Primary care provider: Montrell Ackerman M.D.  Means of arrival: Ambulance  History obtained from: Patient and EMS  History limited by: None    CHIEF COMPLAINT  •  Fall  •  Head Injury    HPI  Thea Kessler is a 78 y.o. female who presents to the Emergency Department by ambulance for a fall and head injury with an onset of today.  The patient was admitted ten days ago at [facility] for a myocardial infarction. Cardiac stents were placed. She was started on Plavix and Eliquis. Last dose is unknown. She began to feel lightheaded this morning while walking to her bathroom and experienced a ground level fall. Positive syncope and head injury to her left head. She was evaluated at [facility] where she was diagnosed with a subarachnoid hemorrhage. Dr. Ferguson, Neurosurgery, was consulted and requested the patient be transferred. He recommended reversal and platelet therapy. She was administered 4F-PCC. She was hypotensive en route but has a normal blood pressure at this time, noted at 117/94. She complains of a persistent headache, slight confusion and nausea. Negative for vision changes, chest pain, shortness of breath, abdominal pain, vomiting, neck pain, back pain, extremity numbness or weakness.       REVIEW OF SYSTEMS  Pertinent positives include ground level fall, lightheaded, syncope, left head injury, subarachnoid hemorrhage, hypotension, confusion, headache and nausea. Pertinent negatives include no vision changes, chest pain, shortness of breath, abdominal pain, vomiting, neck pain, back pain, extremity numbness or weakness.  All other systems reviewed and negative. See HPI for further details.      PAST MEDICAL HISTORY  Patient has a past medical history of Arthritis; Depression; GERD (gastroesophageal reflux disease); Hypokalemia; and Swelling. Recent admission for MI and required cardiac stent  "placement.      SURGICAL HISTORY  Patient has a past surgical history that includes appendectomy; hemorrhoidectomy; and cholecystectomy.      SOCIAL HISTORY  Social History   Substance Use Topics   • Smoking status: Current Every Day Smoker   • Alcohol use Yes      History   Drug Use No       FAMILY HISTORY  History reviewed. No pertinent family history.       CURRENT MEDICATIONS  Home Medications     Reviewed by Amanda De Leon (Pharmacy Tech) on 06/13/19 at 1143  Med List Status: Complete   Medication Last Dose Status   ALPRAZolam (XANAX) 1 MG Tab 6/12/2019 Active   apixaban (ELIQUIS) 5mg Tab 6/12/2019 Active   clopidogrel (PLAVIX) 75 MG Tab 6/12/2019 Active   docusate sodium (COLACE) 100 MG Cap 6/12/2019 Active   DULoxetine (CYMBALTA) 60 MG Cap DR Particles delayed-release capsule 6/12/2019 Active   esomeprazole (NEXIUM) 40 MG delayed-release capsule 6/12/2019 Active   furosemide (LASIX) 20 MG Tab 6/12/2019 Active   Guaifenesin 400 MG Tab PRN Active   ipratropium-albuterol (DUONEB) 0.5-2.5 (3) MG/3ML nebulizer solution 6/12/2019 Active   nicotine (NICODERM) 14 MG/24HR PATCH 24 HR 6/12/2019 Active   potassium chloride SA (K-DUR) 10 MEQ Tab CR 6/12/2019 Active   QUEtiapine (SEROQUEL) 25 MG Tab 6/12/2019 Active   triamterene-hydrochlorothiazide (MAXZIDE 75-50) 75-50 MG Tab 6/12/2019 Active   vitamin D (CHOLECALCIFEROL) 1000 UNIT Tab 6/12/2019 Active                ALLERGIES  Allergies   Allergen Reactions   • Lipitor [Atorvastatin Calcium]      And related meds   • Pcn [Penicillins] Hives and Swelling   • Sulfa Drugs Hives and Swelling       PHYSICAL EXAM  VITAL SIGNS: /72   Pulse (!) 56   Temp 36.1 °C (96.9 °F)   Resp 18   Ht 1.791 m (5' 10.5\")   Wt 74.4 kg (164 lb)   SpO2 94%   BMI 23.20 kg/m²     Constitutional: Well developed, Well nourished, No distress, Non-toxic appearance.   HENT: Normocephalic, Tenderness over left zygomatic arch, Bilateral external ears normal, No hemotympanum, Bilateral " TM's clear, Oropharynx moist, No oral exudates.   Eyes: 6 mm pupils, PERRLA, EOMI, Conjunctiva normal, No discharge.   Neck: No cervical spine tenderness, Supple, No stridor.   Lymphatic: No lymphadenopathy noted.   Cardiovascular: Normal heart rate, Normal rhythm.   Thorax & Lungs: Clear to auscultation bilaterally, No respiratory distress, No wheezing, No crackles. Non tender chest wall.  Abdomen: Soft, No tenderness, No masses, No pulsatile masses.   Skin: Warm, Dry, No erythema, No rash.   Extremities:, No edema No cyanosis.   Musculoskeletal: No tenderness to palpation or major deformities noted.  Intact distal pulses  Neurologic: Awake, alert. Moves all extremities spontaneously.  Psychiatric: Affect normal, Judgment normal, Mood normal.         LABS  Labs Reviewed   CBC WITHOUT DIFFERENTIAL - Abnormal; Notable for the following:        Result Value    WBC 15.8 (*)     All other components within normal limits   COMP METABOLIC PANEL - Abnormal; Notable for the following:     Potassium 2.9 (*)     Anion Gap 12.0 (*)     Glucose 113 (*)     All other components within normal limits   PROTHROMBIN TIME - Abnormal; Notable for the following:     PT 15.0 (*)     INR 1.15 (*)     All other components within normal limits   PLATELET MAPPING WITH BASIC TEG - Abnormal; Notable for the following:     Maximum Clot Strength-MA 71.9 (*)     All other components within normal limits   ESTIMATED GFR - Abnormal; Notable for the following:     GFR If  54 (*)     GFR If Non  45 (*)     All other components within normal limits   ACCU-CHEK GLUCOSE - Abnormal; Notable for the following:     Glucose - Accu-Ck 109 (*)     All other components within normal limits   PLATELETS REQUEST   DIAGNOSTIC ALCOHOL   LACTIC ACID   APTT   COD (ADULT)   COMPONENT CELLULAR   TROPONIN     All labs reviewed by me.      COURSE & MEDICAL DECISION MAKING  Pertinent Labs & Imaging studies reviewed. (See chart for  details)    11:23 AM Patient seen and examined as a trauma red for a fall and head injury. Plan to complete a platelet transfusion. Blood consent obtained as noted below.    Initial orders in the Emergency Department included laboratory testing: diagnostic alcohol, CBC without differential, CMP, estimated GFR, lactic acid, prothrombin time, platelet mapping with basic TEG and COD.      11:34 AM Dr. Tyler Barrios, Trauma Surgery, is evaluating the patient.    11:41 AM Obtained and reviewed medical records which indicate an ED visit at [facility] earlier this morning. CT head indicated an acute subarachnoid hemorrhage. She was hypokalemic. Consult with Dr. Ferguson, Neurosurgery. Reverse Eliquis.     12:00 PM Plan for the patient to be admitted to the floor by Dr. Barrios.       Blood consent:  I have explained to the patient the risks and benefits of transfusion of blood products.  This includes, as appropriate, the risk of mild allergic reaction, hemolytic reaction, transfusion-associated lung injury, febrile reactions, circulatory or iron overload, and infection.    We discussed possible alternatives and their risks, including directed donation, autologous transfusion, and no transfusion, including IV or oral iron supplementation, as appropriate.  I believe the patient understands the risks and benefits and was able to express understanding.      CRITICAL CARE  The very real possibility of a deterioration of this patient's condition required the highest level of my preparedness for sudden, emergent intervention.  I provided critical care services, which included medication orders, frequent reevaluations of the patient's condition and response to treatment, ordering and reviewing test results, and discussing the case with various consultants.  The critical care time associated with the care of the patient was 35 minutes. Review chart for interventions. This time is exclusive of any other billable procedures.        Decision Making:  Patient was seen and evaluated as a trauma red, transferred outside hospital secondary to subarachnoid hemorrhage, the patient is on anticoagulants.  The patient got for factor PCC at the outside hospital however an adequate dose per pharmacy, will give the patient more for factors PCC.  I also placed an order for the patient received platelets.  Consented the patient for this.  Patient will be admitted to the ICU.  Discussed case with Dr. Ferguson will consult the patient.  Discussed the case with Dr. Barrios will admit the patient to the ICU.      DISPOSITION  Patient will be admitted to Dr. Barrios, Trauma Surgery, in critical condition.       DIAGNOSIS  1. Syncope, unspecified syncope type    2. SAH (subarachnoid hemorrhage) (HCC)           Christa BHAT (Scribe), am scribing for, and in the presence of, José Luis Mg M.D.    Electronically signed by: Christa Delgado (Scribe), 6/13/2019    José Luis BHAT M.D. personally performed the services described in this documentation, as scribed by Christa Delgado in my presence, and it is both accurate and complete. C.    The note accurately reflects work and decisions made by me.  José Luis Mg  6/13/2019  3:49 PM

## 2019-06-13 NOTE — CARE PLAN
Problem: Change in Neuro assessment requiring intervention  Goal: Stabilization of Nuero status  Q 1 hr neuro assessments with reorientation

## 2019-06-13 NOTE — ED NOTES
Per JOSE, the patient was at home at 0915 when she had a syncopal episode and ground level fall hitting her left head and face. She was transported to [facility], got a head CT, and then transported here. The patient received Kcentra and Vitamin K at [facility]. The patient takes Eliquis.

## 2019-06-13 NOTE — ASSESSMENT & PLAN NOTE
Syncope at home and GLF.  Does report history of syncope since childhood, attributed to vasovagal response. Seizures ruled out.  EKG from referring facility with atrial fibrillation; ventricular tachycardia, unsustained; left bundle branch block.  Repeat EKG with sinus rhythm, ventricular bigeminy, nonspecific IVCD with LAD and LVH with secondary repolarization abnormality.  6/14 Carotid duplex with bilateral plaque of the bifurcation extending into the internal carotid. Velocities are consistent with < 50% stenosis of the internal carotid artery. Incidental finding: Right vertebral artery lies outside of vertebrae until the mid-distal segment. Antegrade flow is observed.  Left side dampened, slightly resistive flow is observed in the vertebral artery consistent with a possible more distal obstruction  6/14 Echocardiogram: normal chamber sizes. Normal RV size and function. LV function is mild to moderate depressed. LVEF is 40% visually, worse on ectopic beats. There is apical and lateral wall hypokinesis. RVSP estimated to 30-35 mmHg. Mild mitral annular calcification.  6/20 Amiodarone initiated    Felicitas Anthony MD. cardiology

## 2019-06-13 NOTE — ASSESSMENT & PLAN NOTE
Syncope and GLF while on blood thinners. Seen at [facility].  Trauma Red Transfer Activation.  Tyler Barrios MD. Trauma Surgery.

## 2019-06-13 NOTE — ED PROVIDER NOTES
ED Provider Note    CHIEF COMPLAINT  Chief Complaint   Patient presents with   • Syncope     syncope  fell and hit head  10 days post stents at [facility]  Denies any pain  blood pressure 52/31 on scene       HPI  Thea Kessler is a 78 y.o. female who presents to the emergency department after an episode of syncope.  The patient says that she was not feeling well for last couple days.  She is had some nausea and vomiting.  She is been constipated.  Not had any diarrhea or melena.  Patient felt lightheaded this morning.  She was in the kitchen when she passed out.  She fell and hit the left side of her face.  She complains of left-sided face pain.  She denies any chest pain.  No abdominal pain.  No numbness, tingling, weakness.    REVIEW OF SYSTEMS  See HPI for further details. All other systems are negative.     PAST MEDICAL HISTORY  Past Medical History:   Diagnosis Date   • Arthritis    • Depression    • GERD (gastroesophageal reflux disease)    • Hypokalemia    • Swelling     Left side of body, pt states will swell on Left side unless taking Maxide       FAMILY HISTORY  No family history on file.    SOCIAL HISTORY  Social History     Social History   • Marital status:      Spouse name: N/A   • Number of children: N/A   • Years of education: N/A     Social History Main Topics   • Smoking status: Current Every Day Smoker   • Smokeless tobacco: Not on file   • Alcohol use Yes   • Drug use: No   • Sexual activity: Not on file     Other Topics Concern   • Not on file     Social History Narrative   • No narrative on file       SURGICAL HISTORY  Past Surgical History:   Procedure Laterality Date   • APPENDECTOMY     • CHOLECYSTECTOMY     • HEMORRHOIDECTOMY         CURRENT MEDICATIONS  Home Medications    **Home medications have not yet been reviewed for this encounter**         ALLERGIES  Allergies   Allergen Reactions   • Lipitor [Atorvastatin Calcium]      And related meds   • Pcn [Penicillins] Hives and  "Swelling   • Sulfa Drugs Hives and Swelling       PHYSICAL EXAM  VITAL SIGNS: /70   Pulse 65   Temp 36.7 °C (98.1 °F) (Temporal)   Resp 13   Ht 1.702 m (5' 7\")   Wt 75 kg (165 lb 5.5 oz)   SpO2 99%   BMI 25.90 kg/m²   Constitutional:  Well developed, Well nourished, elderly, anxious, ill-appearing.   HENT: Normocephalic, patient has swelling and bruising over the left side of the forehead and cheek.  Dry mucous membranes noted.  Eyes: PERRLA, EOMI, Conjunctiva normal, No discharge.   Neck: Normal range of motion, No tenderness, Supple, No stridor.  No midline spinal tenderness to palpation.  Lymphatic: No lymphadenopathy noted.   Cardiovascular: Normal heart rate, Normal rhythm, No murmurs, No rubs, No gallops.   Thorax & Lungs: Normal breath sounds, No respiratory distress, No wheezing, No chest tenderness.   Skin: Warm, Dry, No erythema, No rash.   Extremities: Intact distal pulses, No edema, No tenderness, No cyanosis, No clubbing.   Neurologic: Alert & oriented x 2, Normal motor function, Normal sensory function, No focal deficits noted.  Answers questions appropriately.  However occasionally seems to be speaking to her dead .  Moves extremities x4.  Psychiatric: Affect normal, Judgment normal, Mood normal.  Anxious.    RADIOLOGY/PROCEDURES  DX-CHEST-PORTABLE (1 VIEW)   Final Result      Stable large lung volumes without consolidation identified      CT-HEAD W/O   Final Result      Acute subarachnoid hemorrhage is moderate volume with no intra-axial hematoma or hydrocephalus identified      No significant mass effect      Moderate atrophy and white matter disease      Findings were discussed with PENNY HONG on 6/13/2019 10:33 AM.        Results for orders placed or performed during the hospital encounter of 06/13/19   CBC w/ Differential   Result Value Ref Range    WBC 10.9 (H) 4.8 - 10.8 K/uL    RBC 4.55 4.20 - 5.40 M/uL    Hemoglobin 13.8 12.0 - 16.0 g/dL    Hematocrit 40.3 37.0 - 47.0 " %    MCV 88.6 81.4 - 97.8 fL    MCH 30.3 27.0 - 33.0 pg    MCHC 34.2 33.6 - 35.0 g/dL    RDW 46.7 35.9 - 50.0 fL    Platelet Count 333 164 - 446 K/uL    MPV 10.3 9.0 - 12.9 fL    Neutrophils-Polys 73.60 (H) 44.00 - 72.00 %    Lymphocytes 16.80 (L) 22.00 - 41.00 %    Monocytes 7.70 0.00 - 13.40 %    Eosinophils 0.80 0.00 - 6.90 %    Basophils 0.90 0.00 - 1.80 %    Immature Granulocytes 0.20 0.00 - 0.90 %    Nucleated RBC 0.00 /100 WBC    Neutrophils (Absolute) 8.01 (H) 2.00 - 7.15 K/uL    Lymphs (Absolute) 1.83 1.00 - 4.80 K/uL    Monos (Absolute) 0.84 0.00 - 0.85 K/uL    Eos (Absolute) 0.09 0.00 - 0.51 K/uL    Baso (Absolute) 0.10 0.00 - 0.12 K/uL    Immature Granulocytes (abs) 0.02 0.00 - 0.11 K/uL    NRBC (Absolute) 0.00 K/uL   Complete Metabolic Panel (CMP)   Result Value Ref Range    Sodium 136 135 - 145 mmol/L    Potassium 2.5 (LL) 3.6 - 5.5 mmol/L    Chloride 94 (L) 96 - 112 mmol/L    Co2 29 20 - 33 mmol/L    Anion Gap 13.0 (H) 0.0 - 11.9    Glucose 120 (H) 65 - 99 mg/dL    Bun 23 (H) 8 - 22 mg/dL    Creatinine 1.23 0.50 - 1.40 mg/dL    Calcium 8.9 8.4 - 10.2 mg/dL    AST(SGOT) 25 12 - 45 U/L    ALT(SGPT) 13 2 - 50 U/L    Alkaline Phosphatase 80 30 - 99 U/L    Total Bilirubin 1.5 0.1 - 1.5 mg/dL    Albumin 3.0 (L) 3.2 - 4.9 g/dL    Total Protein 6.2 6.0 - 8.2 g/dL    Globulin 3.2 1.9 - 3.5 g/dL    A-G Ratio 0.9 g/dL   Troponin STAT   Result Value Ref Range    Troponin I 0.05 (H) 0.00 - 0.04 ng/mL   Magnesium   Result Value Ref Range    Magnesium 1.5 1.5 - 2.5 mg/dL   Phosphorus   Result Value Ref Range    Phosphorus 2.8 2.5 - 4.5 mg/dL   PT/INR   Result Value Ref Range    PT 16.5 (H) 12.0 - 14.6 sec    INR 1.31 (H) 0.87 - 1.13   APTT   Result Value Ref Range    APTT 30.1 24.7 - 36.0 sec   COD - Adult (Type and Screen)   Result Value Ref Range    ABO Grouping Only A     Rh Grouping Only NEG    ESTIMATED GFR   Result Value Ref Range    GFR If  51 (A) >60 mL/min/1.73 m 2    GFR If Non African  American 42 (A) >60 mL/min/1.73 m 2   EKG   Result Value Ref Range    Report       [facility] Emergency Dept.    Test Date:  2019  Pt Name:    PATI CUELLAR             Department: [facility]  MRN:        [id number]                      [facility]  Gender:     Female                       Technician: IAM  :        [id number]                   Requested By:PENNY HONG  Order #:    [id number]                    Reading MD: PENNY HONG MD    Measurements  Intervals                                Axis  Rate:       73                           P:  PA:                                      QRS:        -66  QRSD:       154                          T:          119  QT:         475  QTc:        524    Interpretive Statements  Atrial fibrillation  Ventricular tachycardia, unsustained  Left bundle branch block  No previous ECG available for comparison    Electronically Signed On 2019 10:35:59 PDT by PENNY HONG MD           COURSE & MEDICAL DECISION MAKING  Pertinent Labs & Imaging studies reviewed. (See chart for details)    Patient presents today after an episode of syncope.  She was hypotensive for the paramedics and field with a systolic pressure in the 50s or 60s range.  Patient has an abnormal EKG but not diagnostic for ischemia.  Given her head trauma concern for intracranial hemorrhage.  CT scan will be obtained.    CT scan is obtained.  This is remarkable for subarachnoid hemorrhage.  The patient is on Eliquis.  She also takes antiplatelet therapy.  Therefore the patient will be treated with Kcentra and a platelet transfusion.  I contacted the on-call neurosurgeon Dr. Ferguson.    Dr. Ferguson returned my call.  Recommends reversal and platelet therapy.  Recommends transfer to [facility] where trauma services and neurosurgical services are available.  He says that on reviewing the scan at this time this appears to be nonsurgical at this time and  recommends trauma admission to the ICU and reversal.    I discussed this with the patient and family.  The patient will be transferred.  She received Kcentra in the emergency department prior to transfer.    CRITICAL CARE  I provided critical care services, which included medication orders, frequent reevaluations of the patient's condition and response to treatment, ordering and reviewing test results, and discussing the case with various consultants.  The critical care time associated with the care of the patient was 35 minutes. Review chart for interventions. This time is exclusive of any other billable procedures.        FINAL IMPRESSION  1. Intracranial bleeding (HCC)    2. Chronic anticoagulation    3. Syncope, unspecified syncope type    4. Adverse effect of antiplatelet agent, initial encounter    5. Abnormal EKG    6. Hypokalemia            Electronically signed by: Niko Zaragoza, 6/13/2019 11:21 AM

## 2019-06-13 NOTE — ASSESSMENT & PLAN NOTE
Chronic condition treated with Maxide.  Resumed maintenance medication.  6/18 Initiate metoprolol.

## 2019-06-13 NOTE — PROGRESS NOTES
Pt arrived to [facility] via [facility] and ACLS RN on zoll, 4L NC, VSS.   AOx1-2.STAT EKG ordered for bradycardia and ectopy    2 RN skin check complete.   Redness noted to her sacrum, mepilex applied  Abrasion, and swelling to her left face  Bilateral FA bruising  All interventions in place.

## 2019-06-13 NOTE — CONSULTS
Surgery Neurosurgery Consultation    Date of Service  6/13/2019    Referring Physician  No att. providers found    Consulting Physician  Honey Ferguson M.D.    Reason for Consultation  Intracranial bleed    History of Presenting Illness  The lady did not have the teeth and was little bit confused.  Her daughter was in attendance.  I took the history from the daughter and also from the emergency physician's notes.    Thea Kessler is a 78 y.o. female who presents to the Emergency Department by ambulance for a fall and head injury with an onset of today.  The patient was admitted ten days ago at [facility] for a myocardial infarction. Cardiac stents were placed. She was started on Plavix and Eliquis. Last dose is unknown. She began to feel lightheaded this morning while walking to her bathroom and experienced a ground level fall. Positive syncope and head injury to her left head. She was evaluated at [facility] where she was diagnosed with a subarachnoid hemorrhage. Dr. Ferguson, Neurosurgery, was consulted and requested the patient be transferred. He recommended reversal and platelet therapy. She was administered 4F-PCC. She was hypotensive en route but has a normal blood pressure at this time, noted at 117/94. She complains of a persistent headache, slight confusion and nausea. Negative for vision changes, chest pain, shortness of breath, abdominal pain, vomiting, neck pain, back pain, extremity numbness or weakness    When I saw the emergency room with the daughter she did have a teeth in.  It was hard to assess her from a speech point of view because her teeth were not in.  Review of Systems  ROS    Past Medical History   has a past medical history of Arthritis; Depression; GERD (gastroesophageal reflux disease); Hypokalemia; and Swelling.    Surgical History   has a past surgical history that includes appendectomy; hemorrhoidectomy; and cholecystectomy.    Family History  family history is not  on file.    Social History   reports that she has been smoking.  She does not have any smokeless tobacco history on file. She reports that she drinks alcohol. She reports that she does not use drugs.    Medications      Allergies  Allergies   Allergen Reactions   • Lipitor [Atorvastatin Calcium]      And related meds   • Pcn [Penicillins] Hives and Swelling   • Sulfa Drugs Hives and Swelling       Physical Exam  Temp:  [36.1 °C (96.9 °F)-36.2 °C (97.2 °F)] 36.2 °C (97.2 °F)  Pulse:  [40-76] 76  Resp:  [16-22] 22  BP: (121-131)/(53-72) 126/61  SpO2:  [91 %-100 %] 100 %    Physical Exam      She opens her eyes easily.  GCS was 13-14.  She appeared confused.  She has some bruising on the face particularly the left side.  There is no collar in place.  Pupils were 3 mm equal and reactive.  The neck was supple.  Speech was difficult to assess as she did not have the teeth in.  In no apparent distress  Affect, mood appropriate  Pupils 3 mm midline, reactive.  Conjugate gaze.  Visual fields full to confrontation  Face symmetric  Tongue midline without fasciculation  Facial sensation intact light touch  Hearing intact to conversation, light finger rub bilaterally  Motor:  She appears to have no weakness in the upper lower extremities.Reflex:  No Hahn's no clonus  Finger-nose-finger, STEPHEN unremarkable    Fluids  Date 06/13/19 0700 - 06/14/19 0659   Shift 7827-3113 3950-9944 0213-4625 24 Hour Total   I  N  T  A  K  E   I.V. 1600   1600    Blood 0   0    Shift Total 1600   1600   O  U  T  P  U  T   Other 0   0    Blood 0   0    Shift Total 0   0   Weight (kg) 71.4 71.4 71.4 71.4         Laboratory  Recent Labs      06/13/19   1017  06/13/19   1125   WBC  10.9*  15.8*   RBC  4.55  4.54   HEMOGLOBIN  13.8  13.9   HEMATOCRIT  40.3  41.2   MCV  88.6  90.7   MCH  30.3  30.6   MCHC  34.2  33.7   RDW  46.7  48.4   PLATELETCT  333  330   MPV  10.3  10.6     Recent Labs      06/13/19   1017  06/13/19   1125   SODIUM  136  137    POTASSIUM  2.5*  2.9*   CHLORIDE  94*  98   CO2  29  27   GLUCOSE  120*  113*   BUN  23*  22   CREATININE  1.23  1.17   CALCIUM  8.9  9.0     Recent Labs      06/13/19   1017  06/13/19   1125   APTT  30.1  30.0   INR  1.31*  1.15*                 Imaging    US-ABORTED US PROCEDURE    (Results Pending)     She had a CT scan of the brain performed at [facility].Acute subarachnoid hemorrhage is moderate volume with no intra-axial hematoma or hydrocephalus identified    No significant mass effect    Moderate atrophy and white matter disease.   the appearances are of diffuse traumatic subarachnoid hemorrhage.  Assessment/Plan  No new Assessment & Plan notes have been filed under this hospital service since the last note was generated.  Service: Surgery Neurosurgery    Impression    1.  Closed head injury with traumatic subarachnoid hemorrhage on Plavix and Eliquis.  Recommendations 101 reversal.  As discussed with Dr. Barrios    2.  Repeat CT scan in 6 hours with a CT angiogram    3.  Repeat CT scan in the morning if this is stable she can start on Lovenox 40 mg daily    4.  If the CT scanning on Monday which is been ordered is stable she can be fully anticoagulated        There is a risk-benefit everything we do he.  The daughter understands the gravity of every decision.  This was explicitly discussed with her.

## 2019-06-13 NOTE — ASSESSMENT & PLAN NOTE
Recently hospitalized at [facility] for MI.  Stent placement.  Discharge on Plavix and Eliquis.  Referring facility troponin 0.05.  Repeat troponin trend down.

## 2019-06-13 NOTE — FLOWSHEET NOTE
06/13/19 1005   Events/Summary/Plan   Events/Summary/Plan Pt arrived in ER sats 85% on RA, placed pt on 4L NC to keep sats >90%   Non-Invasive Resp Device Site Inspection Completed Intact   Chest Exam   Respiration 16   Pulse 71   Breath Sounds   RUL Breath Sounds Clear   RML Breath Sounds Clear   RLL Breath Sounds Diminished   GABY Breath Sounds Clear   LLL Breath Sounds Diminished   Oximetry   #Pulse Oximetry (Single Determination) Yes   Oxygen   Pulse Oximetry 95 %   O2 (LPM) 4   O2 Daily Delivery Respiratory  Silicone Nasal Cannula

## 2019-06-13 NOTE — ASSESSMENT & PLAN NOTE
Chronic condition treated with seroquel and Cymbalta.  Not resumed during acute phase due to prolonged QT.  Monitor for Cymbalta withdrawal symptoms.

## 2019-06-13 NOTE — ASSESSMENT & PLAN NOTE
Acute subarachnoid hemorrhage is moderate volume with no intra-axial hematoma or hydrocephalus. No significant mass effect.  6/13 Repeat CT scan stable.  6/14 CTA without aneurysm identified  6/17 Repeat CT scan stable.  6/18 Repeat CT scan stable.  6/21 Repeat CT scan withyperdensity in the left parietal sulci compatible with subarachnoid hemorrhages.  Non-operative management.  Post traumatic pharmacologic seizure prophylaxis for 1 week.  Speech Language Pathology; Patient needs 24 hour supervision to address safety and adequate communication  Honey Ferguson MD. Neurosurgery.

## 2019-06-13 NOTE — ASSESSMENT & PLAN NOTE
Plavix for recent MI  Eliquis for PAF  Received KCentra 1958 units and Vitamin K 10 mg at referring facility.  Additional dose of KCentra 1622 units given in ICU.  TEG with platelet mapping with ADP elevation.  Platelet transfusion held.  6/14 Plavix resumed. Lovenox 40 mg daily cleared by neurosurgery, initiated.  6/17 Cleared to restart Eliquis- stop Lovenox. EKG with frequent ectopy.

## 2019-06-13 NOTE — ED NOTES
Patient transfers by ambulance to [facility] ER  Charge nurse called report

## 2019-06-13 NOTE — ASSESSMENT & PLAN NOTE
Admission serum potassium 2.5.  Empiric magnesium and phosphorus replacement.  Replete and trend.

## 2019-06-13 NOTE — PROGRESS NOTES
Trauma Red Transfer  78F witnessed syncope, GLF, anticoagulated  SAH Dx at [facility]  Paged 111  Ortho Team Arrived 1123  No obvious deformities  CXR without evidence of fracture  Availiable if needed [phone]

## 2019-06-13 NOTE — ASSESSMENT & PLAN NOTE
Initial systemic anticoagulation contraindicated secondary to elevated bleeding risk.  6/14 Surveillance venous duplex scanning ordered.  6/14 Lovenox 40 mg daily cleared by neurosurgery, initiated.

## 2019-06-14 PROBLEM — R33.9 URINARY RETENTION: Status: ACTIVE | Noted: 2019-01-01

## 2019-06-14 NOTE — CONSULTS
[facility] Consult.   Patient Name: Thea Kessler  Patient Age, Gender: 78 y.o., female  Date of Consult: 6/14/2019      Name of Requesting Consultant(s): Tyler Barrios M.D.  Consultant: Sebastián Pacheco M.D.  Geriatric Fellow: Jose Daniel Gayle   Reason for Consult: Syncope causing head trauma in geriatric patient with complex medical problems    Chief Complaint: Traumatic Subarachnoid Hemorrhage following syncope while on apixaban and plavix.      History of Present Illness:  Thea is a 78 y.o. Female who fell June 13 2019 morning following a syncopal episode causing a Traumatic Subarachnoid Hemorrhage while on apixaban and plavix. She received Kcentra (4F-PCC) and Vitamin K at [facility] before being transferred to [facility].       Patient had been recently treated for unprovoked PE about 6 weeks ago (presenting as cough/PNA-like symptoms) and then MI 10 days ago at [facility] - where she had stents placed and was started on eliquis for the PE and then clopdiogrel for the stents. Prior to discharge, she began treatment for UTI June 11 with Cefdinir 300mg BID (for 3 day period), but did not take the medication as she was concerned about an allergy and she could not get a hold of her PCP for a new Rx. She denies any fever, chills, back pain or dysuria at this time. She has a badillo in place.     Yesterday morning, the patient endorsed remembering going to the kitchen, but did not a fall in the kitchen. Chart review suggests the patietn previosly endorsesd patient began to feel lightheaded while walking to her kitchen and experienced a ground level fall and found by grandchildren, however those symptoms were nto recalled or endorsed today. The patietn endorsed confusion after the fall, but denied any loss of bowel or bladder or tongue bitting. No known history of seizure or stroke.       Patient reports a remote history of recurrent syncopal episodes that sound heat related vs carotid sinus  hypersensitivity but these episodes have no occurred in years.  Patient does remark intermittent significant palpitations. She reports a long history of acid reflux unresponsive to her PPI (Nexium 40) and her MI main symptom she experienced was terrible heart burn. She reports that she received two stents, one placed in RCA. The day prior to discharge from hospital (for her MI) (Ana 10) patient began experiencing dysuria and was prescribed Cefdinir (as per our charts) - but patient did not take antibiotics because she was told that the prescribed antibiotic had sulfa in it (perhaps it was not Cefdinir and was bactrim). Today patient is no longer experiencing dysuria, fever, chills,  Or back pain.    En route to [facility], patient was hypotensive but normalized by time she was admitted. Patient continued having persistent headache, slight confusion and nausea according to ER notes. She was noted to have bradycardia and ectopic beats in the ER. She was found to have a K of 2.5 in the ER.     Pertinent negatives include no vision changes, chest pain, shortness of breath, abdominal pain, vomiting, neck pain, back pain, extremity numbness or weakness.  With regards to the syncopal episode, she does not recall the moments before the fall. She had no seizure findings (such as no tongue biting, no incontinence or visible tonic clonic motions documented).  Patient reported minimal PO intake since leaving hospital.    Patient current active smoker and does not drink alcohol. Her home medications include Alprazolam 0.5mg (half a 1mg tablet - nightly for > 5 years), Plavix 75mg, Duloxetine 60mg, Nexium 40mg, Lasix 20mg, Ipratropium-albuterol, Nicotine patch, Seroquel 25mg Qhs (for antidepressant complimentary boots), Triamterene-hydrochlorothiazide, colace and vitamin D.    Nsx (Dr. Ferguson) plan - CT Monday - if stable, anticoagulation to be restarted.     Social: Pt daughter is Brianne Zaman-she states she is POA -there are no  papers on file. Patient specified she would like her daughter present today to make decisions in her place if needed.    Patient was mildly confused when she first presented but no evidence of confusion was witnessed by us today.    Patient reports chronic mild facial droop on right side. 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 Lives with great-grandchild of 8 years old in rather remote community but had been recently staying in [facility] with daughter and grandsons (who found her collapsed).    Lastly, patient reports a significant weight loss of 25 pounds in the last 4-5 months, unintentional. Described lack of appetite and lack of desire to cook only for herself. No other B symptoms. Patient states mamograms Q2 years and last C-Scope 3 years ago. No significant family history. Patient however has a heavy smoking history (albeit patient had CTA 6 weeks ago diagnosing her PE, and although we don't have that report, I would imagine if there was a lung mass concerning for malignancy she would have been told) - we have asked for records from [facility].    Activity Level:   Heavy    Activities of Daily Living:   Independent    This is a reflection of patient's normal baseline. Currently patient has remained in bed since admission. Will be assessed by PT OT.    ROS:A 14 sytem ROS is negative other than as stated above in HPI.     Past Medical History:   Diagnosis Date   • Arthritis    • Depression    • GERD (gastroesophageal reflux disease)    • Hypokalemia    • Swelling     Left side of body, pt states will swell on Left side unless taking Maxide   **Recent MI with Cardiac Stents     Current Facility-Administered Medications:   •  Respiratory Care per Protocol, , Nebulization, Continuous RT, Mary Johnson, A.P.R.N.  •  docusate sodium (COLACE)  capsule 100 mg, 100 mg, Oral, BID, Mary Johnsonin, A.P.R.N., 100 mg at 06/14/19 0600  •  senna-docusate (PERICOLACE or SENOKOT S) 8.6-50 MG per tablet 1 Tab, 1 Tab, Oral, Nightly, Mary Johnsonin, A.P.R.N., 1 Tab at 06/13/19 2036  •  senna-docusate (PERICOLACE or SENOKOT S) 8.6-50 MG per tablet 1 Tab, 1 Tab, Oral, Q24HRS PRN, Mary Johnson, A.P.R.N.  •  polyethylene glycol/lytes (MIRALAX) PACKET 1 Packet, 1 Packet, Oral, BID, Mary Johnson, A.P.R.N., Stopped at 06/14/19 0600  •  magnesium hydroxide (MILK OF MAGNESIA) suspension 30 mL, 30 mL, Oral, DAILY, Mary Johnson, A.P.R.N., Stopped at 06/14/19 0600  •  bisacodyl (DULCOLAX) suppository 10 mg, 10 mg, Rectal, Q24HRS PRN, Mary Johnsonin, A.P.R.N.  •  fleet enema 133 mL, 1 Each, Rectal, Once PRN, Mary Johnson, A.P.R.N.  •  LR infusion, , Intravenous, Continuous, Mary Johnson, A.P.R.N., Last Rate: 100 mL/hr at 06/14/19 0608  •  oxyCODONE immediate-release (ROXICODONE) tablet 2.5 mg, 2.5 mg, Oral, Q3HRS PRN, Mary Johnsonin, A.P.R.N.  •  oxyCODONE immediate-release (ROXICODONE) tablet 5 mg, 5 mg, Oral, Q3HRS PRN, Mary Johnsonin, A.P.R.N.  •  morphine (pf) 4 mg/ml injection 1-2 mg, 1-2 mg, Intravenous, Q2HRS PRN, Mary Johnsonin, A.P.R.N.  •  famotidine (PEPCID) tablet 20 mg, 20 mg, Oral, BID, 20 mg at 06/14/19 0601 **OR** famotidine (PEPCID) injection 20 mg, 20 mg, Intravenous, BID, Mary Johnson, A.P.R.N.  •  levETIRAcetam (KEPPRA) 500 mg in  mL IVPB, 500 mg, Intravenous, BID, Mary Johnson, A.P.R.N., Stopped at 06/14/19 0616  •  acetaminophen (TYLENOL) tablet 650 mg, 650 mg, Oral, Q4HRS PRN **OR** acetaminophen (TYLENOL) suppository 650 mg, 650 mg, Rectal, Q4HRS PRN, Mary Johnson, A.P.R.N.  •  insulin regular (HUMULIN R) injection 1-6 Units, 1-6 Units, Subcutaneous, Q6HRS, Stopped at 06/13/19 1215 **AND** Accu-Chek Q6 if NPO, , , Q6H **AND** NOTIFY MD and PharmD, , , Once **AND** glucose 4 g chewable tablet 16 g, 16 g, Oral, Q15 MIN PRN **AND** DEXTROSE 10% BOLUS 250 mL, 250 mL, Intravenous,  "Q15 MIN PRN, LEA Mane.  •  prochlorperazine (COMPAZINE) injection 5 mg, 5 mg, Intravenous, Q6HRS PRN, Tyler Barrios M.D.  •  Pharmacy Consult Request ...Pain Management Review 1 Each, 1 Each, Other, PHARMACY TO DOSE, Honey Ferguson M.D.  •  acetaminophen (TYLENOL) tablet 1,000 mg, 1,000 mg, Oral, Q6HRS, Honey Ferguson M.D., 1,000 mg at 06/14/19 0600  •  MD ALERT...DO NOT ADMINISTER NSAIDS or ASPIRIN unless ORDERED By Neurosurgery 1 Each, 1 Each, Other, PRN, Honey Ferguson M.D.  •  dexamethasone (DECADRON) injection 4 mg, 4 mg, Intravenous, Once PRN, Honey Ferguson M.D.  •  scopolamine (TRANSDERM-SCOP) patch 1 Patch, 1 Patch, Transdermal, Q72HRS PRN, Honey Ferguson M.D.  •  labetalol (NORMODYNE,TRANDATE) injection 10 mg, 10 mg, Intravenous, Q HOUR PRN, Honey Ferguson M.D.  •  hydrALAZINE (APRESOLINE) injection 10 mg, 10 mg, Intravenous, Q HOUR PRN, Honey Ferguson M.D.  •  ipratropium-albuterol (DUONEB) nebulizer solution, 3 mL, Nebulization, Q4H PRN (RT), Marcos Spain M.D.  Social History     Social History   • Marital status:      Spouse name: N/A   • Number of children: N/A   • Years of education: N/A     Occupational History   • Not on file.     Social History Main Topics   • Smoking status: Current Every Day Smoker   • Smokeless tobacco: Not on file   • Alcohol use Yes   • Drug use: No   • Sexual activity: Not on file     Other Topics Concern   • Not on file     Social History Narrative   • No narrative on file     No family history on file.  Past Surgical History:   Procedure Laterality Date   • APPENDECTOMY     • CHOLECYSTECTOMY     • HEMORRHOIDECTOMY       *Patient denies ETOH use. Very rare ETOH in remote past (once a year)    Physical Exam:  /63   Pulse 62   Temp 36.4 °C (97.5 °F) (Temporal)   Resp (!) 23   Ht 1.791 m (5' 10.5\")   Wt 74.2 kg (163 lb 9.3 oz)   SpO2 94%   BMI 23.14 kg/m²     General: Patient looks about stated age. Alert and oriented. Does " have moderately large hematoma on left cheekbone from fall. Right arm very large hematoma resolving from coronary cath.   Chest; Good air entry bilaterally. No wheezing no significant crackes.  Heart: Normal S1 S2, no carotid bruits heard. No S3/4 or murmurs noted.  Abdomen: Soft, benign  Legs - no obvious pitting edema - patient has DVT stocking up to knees.     Neuro: GCS 15/15 - oriented, no signs of delirium. EOM Normal (mild end gaze nystagmus horizontal). DOLLY. Mild Dysarthria (daughter states improving since yesterday)  CN 2-12 normal, except severely decreased left CN2 vision and except for right facial nerve, in which patient has significant right facial droop limited to nasolabial fold, buccal muscle, and mouth. Good frontalis muscle movements (indicating UMN lesion rather than Bell's palsy). Patient also reports hearing deficits L>R  Motor strength is 5/5 throughout numerous muscle groups in UE and LE tested. No pronator drift.   Sensory - grossly normal throughout.  Cerebellar exam. Finger to nose normal, no dysdiadochokinesis, good heel to shin.   Gait - not examined.       Delirium Screen: Negative  Alert: yes  Orientation: Yes   Attention: Yes  Vision Screen: As above, left remote vision loss stating 2/2 stroke  Hearing Screen: Diminished L>R  Ambulation screen:    Labs:   Recent Labs      06/13/19   1017  06/13/19   1125  06/14/19   0430   WBC  10.9*  15.8*  7.3   RBC  4.55  4.54  4.04*   HEMOGLOBIN  13.8  13.9  12.2   HEMATOCRIT  40.3  41.2  37.0   MCV  88.6  90.7  91.6   MCH  30.3  30.6  30.2   RDW  46.7  48.4  49.5   PLATELETCT  333  330  246   MPV  10.3  10.6  11.0   NEUTSPOLYS  73.60*   --   87.90*   LYMPHOCYTES  16.80*   --   10.20*   MONOCYTES  7.70   --   1.20   EOSINOPHILS  0.80   --   0.00   BASOPHILS  0.90   --   0.30     Lab Results   Component Value Date/Time    SODIUM 136 06/14/2019 04:30 AM    POTASSIUM 4.0 06/14/2019 04:30 AM    CHLORIDE 102 06/14/2019 04:30 AM    CO2 26 06/14/2019  04:30 AM    GLUCOSE 151 (H) 06/14/2019 04:30 AM    BUN 15 06/14/2019 04:30 AM    CREATININE 0.96 06/14/2019 04:30 AM      Initial K 2.5 (on [facility] arrival)  Trop 0.05 then 0.04           Imaging: June 13 Impression       Acute subarachnoid hemorrhage is moderate volume with no intra-axial hematoma or hydrocephalus identified    No significant mass effect    Moderate atrophy and white matter disease     CTA June 13 2019    FINDINGS:  The posterior circulation shows the distal vertebral arteries to be patent. The vertebrobasilar confluence is intact. The basilar artery is patent. No aneurysm or occlusive lesion is evident.    The anterior circulation shows no stenotic or occlusive lesion. No aneurysm is evident about the [place].    Redemonstration of bilateral subarachnoid hemorrhage.    3D angiographic/MIP images of the vasculature confirm the vascular findings as described above.  ___________________________________    EKG: Numerous reviewed. Most contained p waves, lots of PVCs, bigemini - readings about non-sustained VT appear to be simply a number of PVCs in a row. Left BBB present.     QTc persistently approx 550    Latest on holter, no HR <55 BPM.    Assessment: 78F known recent MI with 2 stents placed (one in RCA), recent PE (6 weeks ago) on apixaban and plavix sustained head injury causing moderate SAH after syncopal episode. Labs on arrival revealed rather severe hypokalemia, EKG showing bigemini and PVCs, intermittent AF. Exam reveals only left eye near-blindness from remote stroke (per patient) and right facial droop sparing the frontalis (indicating UMN lesion). Currently stabilizing.      Problem List:  Syncope: DDx include hypotension 2/2 decreased PO intake, UTI, vasovagal - given recent MI, Right Coronary stent, and prolonged QTc- sustained arrhythmia (jordan or tachy), Ventricular aneurysm, TIA/Mild Stroke, Weakness from severe hypokalemia (causing fall with head injury)  t-SAH   UTI  Recent  MI  Recent unprovoked PE  Recent unintentional weight loss  Facial Nerve Defect  Prevention of Delirium  Depression  Prolonged QTc   AF  Remote Stroke (?)  Active Smoker       Recommendations:  Echocardiogram ( To R/O post-MI Complications and cardiac function)  Carotid ultrasound  Continue Telemetry  Cardiology Consultation  Obtain Hospital Records (of both PE admission and MI)  Start high dose statin (+ review latest Rx from discharge)  Continue/Complete UTI treatment  Eventual MRI head to assess stroke history  Malignancy work up (due to unintentional weight loss, unprovoked PE, heavy smoking history)  AVOID QTC PROLONGING MEDICATIONS (including those part of home medications such as the seroquel and cymbalta ) watch for SNRI withdrawal syndrome, monitor QTc DAILY WITH EKG    Patient on nighly Xanax 0.5mg therefore would recommend 0.25mg PO Qdaily PRN to prevent withdrawal and treat anxiety - only if needed.  Avoid all opioids given significant neuro injury. Preference to Tylenol          Interventions to be considered in all patients in order to minimize the risk of delirium.   -do not disturb patient (vitals or lab draws) between the hours of 10 PM and 6 AM.  -ideally the patient should not sleep during the day and we should avoid day time naps.   -up in chair for meals  -ambulate at least three times daily, as able  -watch for constipation  -timed voiding - ask patient is she would like to go to the bathroom q 2-3 hours, except during the do not disturb hours.   -remove all necessary lines (central lines, peripheral IVs, feeding tubes, badillo catheters)  -unless patient has shown harm to self or others I would recommend against use of restraints - either chemical or physical (antipsychotics)   -minimize polypharmacy, do not dose medication during sleep hours      Thank you for this interesting consult. We will continue to follow this patient along with you.       Jose Daniel Gayle M.D.  Geriatric Fellow  [facility]  Geriatrics  Available Monday-Friday 8:00-5:00 (excudling holidays)    At the time of the visit, I personally examined the patient and evaluated the patient's medical history, physical examination, laboratory results/studies, and assessment and I discussed the findings and formulated the care plan documented by the fellow physician.    Admitted with after a fall thought to be secondary to syncope.  Patient has had 2 recent admissions one about 6 weeks ago for pulmonary embolism and she was started on apixaban and an MI just last week where she was started on clopidogrel.  Patient has a curious history regarding passing out ever since she was a child however denies any issues with this over the last multiple years.  Based on history and no prodromal symptoms endorsed during our interview, we have concern for cardiac etiology of syncope recommending echocardiogram, bilateral carotid ultrasound.  The patient woke up confused however no other signs or symptoms to suggest seizure therefore along with due to her right-sided facial droop and MRI brain would be indicated if the echo and carotids are unremarkable.  Patient has a significantly low prolonged QTC and would recommend all QTc prolonging agents including Cymbalta and Seroquel.  Patient is on both of these for her mood disorder/depression.  Follow magnesium levels, Ensure normal calcium. I would continue telemetry when she gets transfer to the floor.    We have attempted to obtain records from [facility] but they had not come in as of writing this note.    Consider cardiology consult for recommendations regarding antiplatelet agents as we wait for repeat CT scan on Monday, as the patient is at high likelihood for in-stent thrombosis given the recent placement of her two stents.  They may also help offer insight on the utility of apixaban versus clopidogrel in the setting of recent PE and stent placements.    Thankfully the patient's  subarachnoid hemorrhages appear to be traumatic in nature and not aneurysmal and stable per neurosurgery.    We will continue to follow that patient along with you on Monday if the patient is still admitted.   Unfortunately we do not have weekend or night time coverage.    Recommend to remove badillo and start bladder scan protocol per RN team to assess for continued issues with retention.     Sebastián Pacheco M.D.      Direct Links to Patient Handouts:    [facility] Healthy Aging  [facility] National [place] on Aging  [facility] Patient Resources    Links that can be Cut and Paste into your browser:    Healthy Aging  www.healthinaging.org  Staying Active  www.activeandhealthy.[place].gov.au  Geriatrics Online   [email or web address]

## 2019-06-14 NOTE — PROGRESS NOTES
"Trauma Progress Note     Briefly, this is a 78 y.o. female with a traumatic subarachnoid hemorrhage.    /63   Pulse (!) 40   Temp 36.4 °C (97.6 °F) (Temporal)   Resp (!) 39   Ht 1.791 m (5' 10.5\")   Wt 71.4 kg (157 lb 6.5 oz)   SpO2 98%   BMI 22.27 kg/m²       Intake/Output Summary (Last 24 hours) at 06/13/19 2101  Last data filed at 06/13/19 2030   Gross per 24 hour   Intake          3191.47 ml   Output             1080 ml   Net          2111.47 ml       Lab Results   Component Value Date/Time    WBC 15.8 (H) 06/13/2019 11:25 AM    RBC 4.54 06/13/2019 11:25 AM    HEMOGLOBIN 13.9 06/13/2019 11:25 AM    HEMATOCRIT 41.2 06/13/2019 11:25 AM    MCV 90.7 06/13/2019 11:25 AM    MCH 30.6 06/13/2019 11:25 AM    MCHC 33.7 06/13/2019 11:25 AM    MPV 10.6 06/13/2019 11:25 AM    NEUTSPOLYS 73.60 (H) 06/13/2019 10:17 AM    LYMPHOCYTES 16.80 (L) 06/13/2019 10:17 AM    MONOCYTES 7.70 06/13/2019 10:17 AM    EOSINOPHILS 0.80 06/13/2019 10:17 AM    BASOPHILS 0.90 06/13/2019 10:17 AM        Lab Results   Component Value Date/Time    SODIUM 137 06/13/2019 11:25 AM    POTASSIUM 2.9 (L) 06/13/2019 11:25 AM    CHLORIDE 98 06/13/2019 11:25 AM    CO2 27 06/13/2019 11:25 AM    GLUCOSE 113 (H) 06/13/2019 11:25 AM    BUN 22 06/13/2019 11:25 AM    CREATININE 1.17 06/13/2019 11:25 AM        Lab Results   Component Value Date/Time    PROTHROMBTM 15.0 (H) 06/13/2019 11:25 AM    INR 1.15 (H) 06/13/2019 11:25 AM        Recent Labs      06/13/19   1125   REACTMIN  5.1   CLOTKINET  1.5   CLOTANGL  69.5   MAXCLOTS  71.9*   [id number]  0.3   PRCINADP  76.7   PRCINAA  38.8       Assessment:  Awake and alert female, GCS 15.  Repeat CT without significant change or progression.      Additional Plans:  CTA pending          "

## 2019-06-14 NOTE — PROGRESS NOTES
Neurosurgery Progress Note    Subjective:  Awake and conversant  Hungry    Exam:  Awake, alert   Speech fluent appropriate  In no apparent distress  Affect, mood appropriate  Oriented x 3  Pupils 3 mm midline, reactive.  Conjugate gaze.  Visual fields full to confrontation  Face symmetric  Tongue midline without fasciculation  Facial sensation intact light touch  Hearing intact to conversation, light finger rub bilaterally  Motor:  Bilateral SCM, shoulder shrug, deltoid, bicep, tricep, , wrist extension, hand intrinsics                IP, thigh adduction, thigh abduction, quadriceps, hamstrings, dorsiflexion,                Plantar flexion, foot inversion, foot eversion, EHL 5/5 no pronator drift  Sensation:  Intact touch face, neck, torso, four extremities  Reflex:  No Hahn's no clonus  Finger-nose-finger, STEPHEN unremarkable  Brusied everywhere.     BP  Min: 121/53  Max: 132/63  Pulse  Av.9  Min: 40  Max: 77  Resp  Av.2  Min: 14  Max: 41  Temp  Av.3 °C (97.3 °F)  Min: 36.1 °C (96.9 °F)  Max: 36.4 °C (97.6 °F)  SpO2  Av.6 %  Min: 91 %  Max: 100 %    No Data Recorded    Recent Labs      19   0430   WBC  10.9*  15.8*  7.3   RBC  4.55  4.54  4.04*   HEMOGLOBIN  13.8  13.9  12.2   HEMATOCRIT  40.3  41.2  37.0   MCV  88.6  90.7  91.6   MCH  30.3  30.6  30.2   MCHC  34.2  33.7  33.0*   RDW  46.7  48.4  49.5   PLATELETCT  333  330  246   MPV  10.3  10.6  11.0     Recent Labs      197  19   1125  19   0430   SODIUM  136  137  136   POTASSIUM  2.5*  2.9*  4.0   CHLORIDE  94*  98  102   CO2  29  27  26   GLUCOSE  120*  113*  151*   BUN  23*  22  15   CREATININE  1.23  1.17  0.96   CALCIUM  8.9  9.0  8.4*     Recent Labs      19   1017  19   1125  19   0430   APTT  30.1  30.0   --    INR  1.31*  1.15*  1.06     Recent Labs      19   1125   REACTMIN  5.1   CLOTKINET  1.5   CLOTANGL  69.5   MAXCLOTS  71.9*   ALT17COX   0.3   PRCINADP  76.7   PRCINAA  38.8       Intake/Output       06/13/19 0700 - 06/14/19 0659 06/14/19 0700 - 06/15/19 0659      5797-4505 7153-7675 Total 5949-1695 3905-7529 Total       Intake    P.O.  --  120 120  --  -- --    P.O. -- 120 120 -- -- --    I.V.  1850  1600 3450  --  -- --    Pre-Hospital Volume 1600 -- 1600 -- -- --    Trauma Resuscitation Volume 0 -- 0 -- -- --    Magnesium Sulfate Volume 50 -- 50 -- -- --    Volume (mL) (LR infusion) 200 1600 1800 -- -- --    Blood  0  -- 0  --  -- --    PRBC Total Volume (Non-Barcoded) 0 -- 0 -- -- --    FFP Total Volume (Non-Barcoded) 0 -- 0 -- -- --    Platelets Total Volume (Non-Barcoded) 0 -- 0 -- -- --    Cryoprecipitate (Pooled) Total Volume (Non-Barcoded) 0 -- 0 -- -- --    IV Piggyback  506.8  334.7 841.5  --  -- --    Volume (mL) (levETIRAcetam (KEPPRA) 500 mg in  mL IVPB) -- 200 200 -- -- --    Volume (mL) (potassium phosphates 30 mmol in  mL ivpb) 365.1 134.7 499.8 -- -- --    Volume (mL) (potassium chloride (KCL) ivpb 10 mEq) 141.7 -- 141.7 -- -- --    Total Intake 2356.8 2054.7 4411.5 -- -- --       Output    Urine  200  1800 2000  --  -- --    Number of Times Voided 1 x -- 1 x -- -- --    Urine Void (mL) 200 -- 200 -- -- --    Output (mL) (Urethral Catheter 16 Fr.) -- 1800 1800 -- -- --    Other  0  -- 0  --  -- --    Pre-Hospital Output 0 -- 0 -- -- --    Trauma Resuscitation Output 0 -- 0 -- -- --    Stool  --  -- --  --  -- --    Number of Times Stooled 0 x -- 0 x -- -- --    Blood  0  -- 0  --  -- --    Est. Blood Loss 0 -- 0 -- -- --    Total Output 200 1800 2000 -- -- --       Net I/O     2156.8 254.7 2411.5 -- -- --            Intake/Output Summary (Last 24 hours) at 06/14/19 0817  Last data filed at 06/14/19 0616   Gross per 24 hour   Intake          4411.47 ml   Output             2000 ml   Net          2411.47 ml       $ Bladder Scan Results (mL): 980    • Respiratory Care per Protocol   Continuous RT   • docusate sodium  100 mg  BID   • senna-docusate  1 Tab Nightly   • senna-docusate  1 Tab Q24HRS PRN   • polyethylene glycol/lytes  1 Packet BID   • magnesium hydroxide  30 mL DAILY   • bisacodyl  10 mg Q24HRS PRN   • fleet  1 Each Once PRN   • oxyCODONE immediate-release  2.5 mg Q3HRS PRN   • oxyCODONE immediate-release  5 mg Q3HRS PRN   • morphine injection  1-2 mg Q2HRS PRN   • famotidine  20 mg BID    Or   • famotidine  20 mg BID   • levETIRAcetam (KEPPRA) IV  500 mg BID   • acetaminophen  650 mg Q4HRS PRN    Or   • acetaminophen  650 mg Q4HRS PRN   • insulin regular  1-6 Units Q6HRS    And   • glucose  16 g Q15 MIN PRN    And   • dextrose 10% bolus  250 mL Q15 MIN PRN   • prochlorperazine  5 mg Q6HRS PRN   • Pharmacy Consult Request  1 Each PHARMACY TO DOSE   • acetaminophen  1,000 mg Q6HRS   • MD ALERT...DO NOT ADMINISTER NSAIDS or ASPIRIN unless ORDERED By Neurosurgery  1 Each PRN   • dexamethasone  4 mg Once PRN   • scopolamine  1 Patch Q72HRS PRN   • labetalol  10 mg Q HOUR PRN   • hydrALAZINE  10 mg Q HOUR PRN   • ipratropium-albuterol  3 mL Q4H PRN (RT)     CT stable    Assessment and Plan:  Hospital day #2  POD #0  Prophylactic anticoagulation:yes       Start date/time: today       Plan:  1. Q4hrly neuro checks  2. Advance diet  3. Floor if trauma ok  4. Lovenox 40 mg daily  4. Repeat CT head Monday- if ok then start eliquis and home Tue

## 2019-06-14 NOTE — ASSESSMENT & PLAN NOTE
6/13 Badillo insertion for urinary retention.  6/16 Trial badillo removal.  6/17 Replaced for retrention   6/20 Trial badillo removal

## 2019-06-14 NOTE — THERAPY
"Speech Language Therapy Evaluation completed to address cognition    Functional Status:  Pt alert, loquacious. Pt initially visiting with close family friend, who pt states she raised from a young age. Pt and family friend state that although pt's cognitive-linguistic functioning has improved since admission, some deficits persist. Pt was mildly tangential and perseverative throughout evaluation d/t attention and memory deficits, occasionally becoming frustrated d/t inability to recall staff members' roles or previously discussed information. The [place] Cognitive Assessment was administered, with pt scoring 21/30. Overall, deficits demonstrated were as follows: mild-moderate immediate and short-term memory, expressive aphasia, sustained attention, auditory comprehension, and problem solving; minimal dysarthria.     Recommendations:  1) Encourage pt to teach back to ensure most positive comprehension. 2) Use written reminders as needed to compensate for short-term memory deficit. 3) Pt anticipated to require assistance with IADLs after discharge for safety.    Plan of Care: Will benefit from Speech Therapy 3 times per week    Post-Acute Therapy: Discharge to a transitional care facility for continued skilled therapy services.     See \"Rehab Therapy-Acute\" Patient Summary Report for complete documentation.   "

## 2019-06-14 NOTE — PROGRESS NOTES
TRAUMA TERTIARY SURVEY     Mental status adequate for full examination?: Yes    Spine cleared (radiologically and/or clinically): Yes    PHYSICAL EXAMINATION: See physical exam documented in daily progress note.      IMAGING:  CT-CTA HEAD WITH & W/O-POST PROCESS   Final Result         1. No aneurysm identified in the major intracranial vessels.      2. Redemonstration of bilateral subarachnoid hemorrhage.      CT-HEAD W/O   Final Result      1.  Unchanged subarachnoid hemorrhage bilaterally, extending into the basal cisterns. No hydrocephalus. Awaiting CTA.   2.  Mild-to-moderate global parenchymal atrophy. Chronic small vessel ischemic changes.      US-ABORTED US PROCEDURE    (Results Pending)   US-TRAUMA VEIN SCREEN LOWER BILAT EXTREMITY    (Results Pending)   EC-ECHOCARDIOGRAM COMPLETE W/ CONT    (Results Pending)   US-CAROTID DOPPLER BILAT    (Results Pending)     All current laboratory studies/radiology exams reviewed: Yes    Completed Consultations:  Dr. Ferguson, neurosurgery  Dr. Pacheco, geriatrics     Pending Consultations:  none    Newly Identified Diagnoses and Injuries:  none

## 2019-06-14 NOTE — THERAPY
"Occupational Therapy Evaluation completed.   Functional Status: Pt presents to skilled OT services following syncopal GLF resulting in SAH(non-op), ~10 days ago discharged from hospital following MI and s/p stent placement. Pt performed STS from chair w/ close cga, ambulated short distance with  Cga/sba w/o AD, noted tachy with ambulation pt denies any sob or heart palpitations; however was observed with slight sob post ambulation, donned socks with sba, denied other ADLs this am.  Pt demonstrates impulsivity, poor insight into current deficits, when attempted to educated pt verbalized \"If I fall again, I fall, if it's my time to go then I'm okay with it\" verbose, requires cues to stay on task, unclear if this is baseline versus new. Pt reports dtr works during the daytime and no other help available, will need to clarify. Would recommend obtaining formal cognitive eval prior to d/c home to assess for pt's ability to be by herself during daytime as currently she is high fall risk. Will continue to assess for safe d/c disposition, maybe able to d/c home with intermittent assist from dtr; however pending pt's progress with therapy and results of cognition evaluation.   Plan of Care: Will benefit from Occupational Therapy 3 times per week  Discharge Recommendations:  Equipment: Will Continue to Assess for Equipment Needs. Post-acute therapy see summary above    See \"Rehab Therapy-Acute\" Patient Summary Report for complete documentation.    "

## 2019-06-14 NOTE — CARE PLAN
Problem: Venous Thromboembolism (VTW)/Deep Vein Thrombosis (DVT) Prevention:  Goal: Patient will participate in Venous Thrombosis (VTE)/Deep Vein Thrombosis (DVT)Prevention Measures  Outcome: PROGRESSING AS EXPECTED   06/14/19 0800   Mechanical/VTE Prophylaxis   Mechanical Prophylaxis  SCDs, Sequential Compression Device   SCDs, Sequential Compression Device On       Problem: Bowel/Gastric:  Goal: Normal bowel function is maintained or improved  Outcome: PROGRESSING AS EXPECTED   06/13/19 1400 06/14/19 0800   OTHER   Last BM --  (prior to arrival)   Number of Times Stooled 0 --

## 2019-06-14 NOTE — PROGRESS NOTES
Trauma / Surgical Daily Progress Note    Date of Service  6/14/2019    Chief Complaint  78 y.o. female admitted 6/13/2019 with Trauma    Interval Events    New admit after syncopal event with GLF on blood thinners, SAH  Serial head CTs stable, electrolytes improved, EKGs reviewed  Tertiary survey completed, no further findings  RAP score 9  SBIRT negative    - Plavix resumed, Lovenox 40 mg initiated per Dr Ferguson  - Home meds reviewed  - Syncope work up underway  - Medically stable for transfer to Neuro/Neurosurg with cardiac monitoring    Review of Systems  Review of Systems   Constitutional: Negative for chills and fever.   HENT: Negative for hearing loss.    Eyes: Negative for blurred vision and double vision.   Respiratory: Negative for shortness of breath.    Cardiovascular: Negative for chest pain.   Gastrointestinal: Negative for abdominal pain, nausea and vomiting. Constipation: BM prior to arrival.   Musculoskeletal: Negative for back pain, joint pain, myalgias and neck pain.   Skin: Negative for rash.   Neurological: Negative for dizziness, tingling, sensory change, speech change, focal weakness and headaches.        Reports right facial palsy a recent finding prior to admission   Psychiatric/Behavioral: Negative for substance abuse.        Vital Signs  Temp:  [36.2 °C (97.1 °F)-36.4 °C (97.6 °F)] 36.4 °C (97.5 °F)  Pulse:  [40-77] 68  Resp:  [14-41] 22  BP: (132)/(63) 132/63  SpO2:  [93 %-99 %] 93 %    Physical Exam  Physical Exam   Constitutional: She is oriented to person, place, and time. She appears well-developed. She is cooperative. No distress.   HENT:   Head: Normocephalic.   Left facial/orbital ecchymosis/edema   Eyes: Pupils are equal, round, and reactive to light. Conjunctivae are normal.   Neck: Normal range of motion. Neck supple. No JVD present. No tracheal deviation present.   Cardiovascular: Normal rate and intact distal pulses.  An irregularly irregular rhythm present.   No murmur  heard.  Pulmonary/Chest: Effort normal and breath sounds normal. No respiratory distress. She exhibits no tenderness.   Abdominal: Soft. Bowel sounds are normal. She exhibits no distension. There is no tenderness. There is no guarding.   Genitourinary:   Genitourinary Comments: Avalos to gravity with clear yellow urine   Musculoskeletal: Normal range of motion. She exhibits no edema or tenderness.   Moves all extremities   Neurological: She is alert and oriented to person, place, and time. GCS eye subscore is 4. GCS verbal subscore is 5. GCS motor subscore is 6.   Skin: Skin is warm and dry.   Areas of ecchymosis to BUEs   Psychiatric: She has a normal mood and affect. Her behavior is normal.   Nursing note and vitals reviewed.      Laboratory  Recent Results (from the past 24 hour(s))   ACCU-CHEK GLUCOSE    Collection Time: 06/13/19  1:50 PM   Result Value Ref Range    Glucose - Accu-Ck 109 (H) 65 - 99 mg/dL   TROPONIN    Collection Time: 06/13/19  2:40 PM   Result Value Ref Range    Troponin I 0.04 0.00 - 0.04 ng/mL   ACCU-CHEK GLUCOSE    Collection Time: 06/13/19  7:03 PM   Result Value Ref Range    Glucose - Accu-Ck 103 (H) 65 - 99 mg/dL   ACCU-CHEK GLUCOSE    Collection Time: 06/13/19 11:34 PM   Result Value Ref Range    Glucose - Accu-Ck 150 (H) 65 - 99 mg/dL   CBC with Differential: Tomorrow AM    Collection Time: 06/14/19  4:30 AM   Result Value Ref Range    WBC 7.3 4.8 - 10.8 K/uL    RBC 4.04 (L) 4.20 - 5.40 M/uL    Hemoglobin 12.2 12.0 - 16.0 g/dL    Hematocrit 37.0 37.0 - 47.0 %    MCV 91.6 81.4 - 97.8 fL    MCH 30.2 27.0 - 33.0 pg    MCHC 33.0 (L) 33.6 - 35.0 g/dL    RDW 49.5 35.9 - 50.0 fL    Platelet Count 246 164 - 446 K/uL    MPV 11.0 9.0 - 12.9 fL    Neutrophils-Polys 87.90 (H) 44.00 - 72.00 %    Lymphocytes 10.20 (L) 22.00 - 41.00 %    Monocytes 1.20 0.00 - 13.40 %    Eosinophils 0.00 0.00 - 6.90 %    Basophils 0.30 0.00 - 1.80 %    Immature Granulocytes 0.40 0.00 - 0.90 %    Nucleated RBC 0.00 /100  WBC    Neutrophils (Absolute) 6.39 2.00 - 7.15 K/uL    Lymphs (Absolute) 0.74 (L) 1.00 - 4.80 K/uL    Monos (Absolute) 0.09 0.00 - 0.85 K/uL    Eos (Absolute) 0.00 0.00 - 0.51 K/uL    Baso (Absolute) 0.02 0.00 - 0.12 K/uL    Immature Granulocytes (abs) 0.03 0.00 - 0.11 K/uL    NRBC (Absolute) 0.00 K/uL   Comp Metabolic Panel (CMP): Tomorrow AM    Collection Time: 19  4:30 AM   Result Value Ref Range    Sodium 136 135 - 145 mmol/L    Potassium 4.0 3.6 - 5.5 mmol/L    Chloride 102 96 - 112 mmol/L    Co2 26 20 - 33 mmol/L    Anion Gap 8.0 0.0 - 11.9    Glucose 151 (H) 65 - 99 mg/dL    Bun 15 8 - 22 mg/dL    Creatinine 0.96 0.50 - 1.40 mg/dL    Calcium 8.4 (L) 8.5 - 10.5 mg/dL    AST(SGOT) 28 12 - 45 U/L    ALT(SGPT) 10 2 - 50 U/L    Alkaline Phosphatase 68 30 - 99 U/L    Total Bilirubin 1.0 0.1 - 1.5 mg/dL    Albumin 3.0 (L) 3.2 - 4.9 g/dL    Total Protein 6.0 6.0 - 8.2 g/dL    Globulin 3.0 1.9 - 3.5 g/dL    A-G Ratio 1.0 g/dL   Prothrombin Time (INR)    Collection Time: 19  4:30 AM   Result Value Ref Range    PT 14.1 12.0 - 14.6 sec    INR 1.06 0.87 - 1.13   ESTIMATED GFR    Collection Time: 19  4:30 AM   Result Value Ref Range    GFR If African American >60 >60 mL/min/1.73 m 2    GFR If Non  56 (A) >60 mL/min/1.73 m 2   ACCU-CHEK GLUCOSE    Collection Time: 19  6:15 AM   Result Value Ref Range    Glucose - Accu-Ck 128 (H) 65 - 99 mg/dL   EKG    Collection Time: 19 10:26 AM   Result Value Ref Range    Report       [facility]    Test Date:  2019  Pt Name:    PATI CUELLAR             Department: 19  MRN:        [id number]                      Room:       [facility]  Gender:     Female                       Technician: [facility]  :        1940                   Requested By:MANISH COLBY  Order #:    [id number]                    Reading MD:    Measurements  Intervals                                Axis  Rate:       68                           P:           60  WY:         216                          QRS:        -60  QRSD:       150                          T:          120  QT:         472  QTc:        503    Interpretive Statements  SINUS RHYTHM  MULTIPLE VENTRICULAR PREMATURE COMPLEXES  BORDERLINE AV CONDUCTION DELAY  NONSPECIFIC IVCD WITH LAD  LVH WITH SECONDARY REPOLARIZATION ABNORMALITY  Compared to ECG 06/13/2019 12:03:09  No significant changes         Fluids    Intake/Output Summary (Last 24 hours) at 06/14/19 1336  Last data filed at 06/14/19 1000   Gross per 24 hour   Intake           3859.8 ml   Output             2000 ml   Net           1859.8 ml       Core Measures & Quality Metrics  Labs reviewed, Medications reviewed, Radiology images reviewed and EKG reviewed  Avalos catheter: Urinary Tract Retention or Urinary Tract Obstruction      DVT Prophylaxis: Enoxaparin (Lovenox)  DVT prophylaxis - mechanical: SCDs  Ulcer prophylaxis: Yes        Total Score: 9    ETOH Screening     Assessment complete date: 6/14/2019        Assessment/Plan  Anticoagulated- (present on admission)   Assessment & Plan    Plavix and Eliquis as outpatient.  Received KCentra 1958 units and Vitamin K 10 mg at referring facility.  Additional dose of KCentra 1622 units given in ICU.  TEG with platelet mapping with ADP elevation.  Platelet transfusion held.  6/14 Plavix resumed. Lovenox 40 mg daily cleared by neurosurgery, initiated.  If the CT scanning on 6/17, which is been ordered, is stable she can be fully anticoagulated with Eliquis.     Traumatic subarachnoid hemorrhage with loss of consciousness of 30 minutes or less (HCC)- (present on admission)   Assessment & Plan    Acute subarachnoid hemorrhage is moderate volume with no intra-axial hematoma or hydrocephalus. No significant mass effect.  Repeat CT scan stable. CTA without aneurysm identified.  Repeat CT scan stable.  If the CT scanning on Monday which is been ordered is stable she can be fully anticoagulated.  Non-operative  management.  Post traumatic pharmacologic seizure prophylaxis for 1 week.  Speech Language Pathology cognitive evaluation.  Honey Ferguson MD. Neurosurgery.     Urinary retention   Assessment & Plan    6/13 Badillo insertion for urinary retention.  6/15 Trial badillo removal.     Syncope- (present on admission)   Assessment & Plan    Syncope at home and GLF.  Does report history of syncope since childhood, attributed to vasovagal response. Seizures ruled out.  EKG from referring facility with atrial fibrillation; ventricular tachycardia, unsustained; left bundle branch block.  Repeat EKG with sinus rhythm, ventricular bigeminy, nonspecific IVCD with LAD and LVH with secondary repolarization abnormality.  6/14 Carotid duplex and ECHO pending.  If negative, consider MRI brain to r/o lesion.     History of MI (myocardial infarction)- (present on admission)   Assessment & Plan    Recently hospitalized at [facility] for MI.  Stent placement.  Discharge on Plavix and Eliquis.  Referring facility troponin 0.05.  Repeat troponin trend down.     Hypokalemia- (present on admission)   Assessment & Plan    Admission serum potassium 2.5.  Empiric magnesium and phosphorus replacement.  Replete and trend.     Essential hypertension- (present on admission)   Assessment & Plan    Chronic condition treated with Maxide.  Resumed maintenance medication.     CHF (congestive heart failure) (HCC)- (present on admission)   Assessment & Plan    Chronic condition treated with lasix.  Resumed maintenance medication.     UTI (urinary tract infection)- (present on admission)   Assessment & Plan    Discharged from [facility] on Cefdinir 300 mg BID for 3 days (completed 6/11).     Depression- (present on admission)   Assessment & Plan    Chronic condition treated with seroquel and Cymbalta.  Not resumed during acute phase due to prolonged QT.  Monitor for Cymbalta withdrawal symptoms.     GERD (gastroesophageal reflux disease)-  (present on admission)   Assessment & Plan    Chronic condition treated with Nexium.  Resumed maintenance medication.     Encounter for geriatric assessment- (present on admission)   Assessment & Plan    6/13 Consult placed.  Sebastián Pacheco MD, Geriatrics.     Trauma- (present on admission)   Assessment & Plan    Syncope and GLF while on blood thinners. Seen at [facility].  Trauma Red Transfer Activation.  Tyler Barrios MD. Trauma Surgery.       Discussed patient condition with Family, RN, Patient and trauma surgery. Dr. PRANEETH Spain    Patient seen, data reviewed and discussed.  Agree with assessment and plan.  DARLEEN

## 2019-06-14 NOTE — THERAPY
"Physical Therapy Evaluation completed.   Bed Mobility:     Transfers:    Gait: Level Of Assist: Supervised with No Equipment Needed       Plan of Care: Will benefit from Physical Therapy 2 times per week  Discharge Recommendations: Equipment: Will Continue to Assess for Equipment Needs. Post-acute therapy see assessment.    See \"Rehab Therapy-Acute\" Patient Summary Report for complete documentation.     Pt is a 79 y/o female that presents to acute care after a GLF leading to a SAH. PMH include pt having an MI 10 days prior. Pt was impulsive and showed impaired safety awareness. Was very adamant about wanting to be DC from the hospital. During ambulation was tachycardic, but pt report feeling fine with no SOB or heart palpitations. Pt was able to ambulate 150', likely could have gone farther, but was self limiting. Continue w/ acute care PT address mobility deficits. Will continue to assess DC needs while pt is in house.   "

## 2019-06-15 NOTE — PROGRESS NOTES
Monitor summary: SR:64-78, WV:.20, QRS:.12, QT:.44 with frequent bigem, frequent PVCs, frequent couplets, occasional triplets, rare trigem, and 3 runs of v-tach at 4-6 beats per strip from monitor room.

## 2019-06-15 NOTE — PROGRESS NOTES
Bedside report received and neuro exam performed with primary RN Precious pt pending transport to neurosurgery floor. ICU report already called to floor, report to NOC shift neuro RN pending. Bed ready. To transport pt once report complete.

## 2019-06-15 NOTE — PROGRESS NOTES
1930: [facility] neurosurgery RN ready for pt transport.   1940: Pt transported delroy [id number] accompanied by ACLS RN and CCT on transport monitor via wheelchair without issue. Attached to tele and O2 monitor on arrival. No further questions at this time.

## 2019-06-16 PROBLEM — Z86.79 HISTORY OF CHF (CONGESTIVE HEART FAILURE): Status: ACTIVE | Noted: 2019-01-01

## 2019-06-16 NOTE — PROGRESS NOTES
Monitor Summary: SR 61-65, TX .20, QRS .08, QT .40 with Frequent PVCs, trig, trip, coup, bigem, 4-6 beats of Vtach Q 2 hours per strip from monitor room

## 2019-06-16 NOTE — DISCHARGE PLANNING
Case reviewed with Physiatry Dr. Hernandez. Would appreciate OT/PT updates to reflect current function/mobility status. Rehab  Joselyn [phone] aware. TCC will follow for recommendations.

## 2019-06-16 NOTE — PROGRESS NOTES
Pt visiting with 2 daughters in NAD. Pt denies dizziness or chest pain/pressure. Bed in lowest and locked position-call light in reach and pt instructed to call for any needs/questions.

## 2019-06-16 NOTE — REHABILITATION CONSULTATIVE CHART REVIEW
Medical chart review completed.     Patient is a 78 y.o. female  with a past medical history of  admitted to [facility] on 06/13/2019 by ambulance for evaluation a syncopal event.  She had a ground level fall and hit her head on 06/13/2019.  She was seen at [facility], diagnosed with a subarachnoid hemorrhage, evaluated by Dr. Ferguson and then transferred to [facility].    The patient was treated at [facility] for a myocardial infarction about 10 days prior to this event and was started on plavix and Eliquis.  Dr. Ferguson recommended reversal of anticoagulation and platelet therapy.  She received KCentra and vitamin K 10mg.  A second administration of KCentra was given in the ICU   During transfer to [facility], she was hypotensive and had headache, slight confusion and nausea upon arrival to the ED.    She has been managed with nonoperative treatment for the SAH with one week of prophylactic keppra.  She has been started on Lovenox with plan to return to [facility] depending on the results of her CT on 06/17/2019.    Patient with multiple co-morbidities(including but not limited to urinary retention with recently treated UTI Hypokalemia, CHF, GERD, depression, recent MI s/p stent placement, arthritis, pulmonary embolism about 6 weeks prior to this event); with cognitive deficits and functional deficits in mobility/self-cares/swallowing/speech, and mild de-conditioning.     Pre-morbidly, this patient lived in a two level condo, although it appears that she will be moving and staying with her daughter.  The daughter works in the daytime.  The patient was evaluated by acute care Physical Therapy, Occupational Therapy and Speech Language Pathology; currently requiring supervision assistance for mobility and supervision/ contact-guard assistance for ADLs, also with ongoing cognitive deficits.     The patient is an excellent candidate for an acute inpatient rehabilitation program with a coordinated  program of care at an intensity and frequency not available at a lower level of care.     Note: if the patient continues to progress while waiting for medical clearance, and no longer requires 2 out of 3 therapy services (PT/OT/SLP), then the patient would no longer meet the criteria for acute inpatient rehabilitation.    This recommendation is substantiated by the patient's current medical condition with intervention and assessment of medical issues requiring an acute level of care for patient's safety and maximum outcome. The team will follow-up and re-assess patient's progress and updated OT/PT/SLP therapies to determine how the patient's needs can be best met.   We will continue to follow with you.     Thank you for allowing us to participate in her care.    Rickey Hernandez M.D.  Physical Medicine and Rehabilitation

## 2019-06-16 NOTE — PROGRESS NOTES
RN MOBILITY NOTE     Surgery patient?: no  Date of surgery: n/a  Ambulated 50 ft on day of surgery? (N/A if today is not date of surgery): n/a  Number of times ambulated 50 feet or greater today: 3  Patient has been up to chair, edge of bed or HOB 90 degrees for all meals?: yes  Goal met? (goal is ambulating at least 50 feet 2 times on day shift, one time on night shift): yes  If patient did not meet mobility goal, why?: n/a

## 2019-06-16 NOTE — CARE PLAN
Problem: Urinary Elimination:  Intervention: Assess and monitor for signs and symptoms of urinary retention  Pt attempted to urinate on toilet x 3. Avalos dc'd at 0900. Pt unable to void within 6 hrs. Bladder scan 647ml w/ pt complaints of bladder distention. Avalos catheter placed per order.

## 2019-06-16 NOTE — PROGRESS NOTES
Trauma / Surgical Daily Progress Note    Date of Service  6/15/2019    Chief Complaint  78 y.o. female admitted 6/13/2019 with Trauma    Interval Events  Transferred from ICU to neurosurgery  Therapy notes reviewed  Ectopy noted on remote telemetry monitoring    - Potassium / magnesium replaced  - AM labs  - Repeat CT per neurosurgical recommendations    Review of Systems  Review of Systems   Constitutional: Negative for chills and fever.   Respiratory: Negative for shortness of breath.    Cardiovascular: Negative for chest pain.   Gastrointestinal: Negative for abdominal pain, nausea and vomiting.   Musculoskeletal: Negative for myalgias.   Neurological: Negative for sensory change, focal weakness and headaches.        Vital Signs  Temp:  [36.5 °C (97.7 °F)-37.1 °C (98.7 °F)] 37.1 °C (98.7 °F)  Pulse:  [60-87] 87  Resp:  [16-20] 16  BP: (108-124)/(53-70) 108/65  SpO2:  [90 %-100 %] 100 %    Physical Exam  Physical Exam   Constitutional: She is oriented to person, place, and time. She appears well-developed. She is cooperative. No distress.   HENT:   Head: Normocephalic.   Left facial/orbital ecchymosis/edema    Eyes: Conjunctivae are normal.   Neck: No JVD present. No tracheal deviation present.   Cardiovascular: Normal rate and intact distal pulses.    Pulmonary/Chest: Effort normal and breath sounds normal. No respiratory distress. She exhibits no tenderness.   Abdominal: Soft. She exhibits no distension. There is no tenderness. There is no guarding.   Genitourinary:   Genitourinary Comments: Avalos to gravity with clear yellow urine    Musculoskeletal: She exhibits no edema or tenderness.   Moves all extremities    Neurological: She is alert and oriented to person, place, and time. GCS eye subscore is 4. GCS verbal subscore is 5. GCS motor subscore is 6.   Skin: Skin is warm and dry.   Areas of ecchymosis to BUEs    Psychiatric: She has a normal mood and affect.   Nursing note and vitals  reviewed.      Laboratory  Recent Results (from the past 24 hour(s))   MAGNESIUM    Collection Time: 06/15/19  4:06 AM   Result Value Ref Range    Magnesium 1.8 1.5 - 2.5 mg/dL   Basic Metabolic Panel    Collection Time: 06/15/19  4:06 AM   Result Value Ref Range    Sodium 137 135 - 145 mmol/L    Potassium 2.9 (L) 3.6 - 5.5 mmol/L    Chloride 100 96 - 112 mmol/L    Co2 28 20 - 33 mmol/L    Glucose 106 (H) 65 - 99 mg/dL    Bun 16 8 - 22 mg/dL    Creatinine 0.99 0.50 - 1.40 mg/dL    Calcium 8.8 8.5 - 10.5 mg/dL    Anion Gap 9.0 0.0 - 11.9   CBC with Differential: Tomorrow AM    Collection Time: 06/15/19  4:06 AM   Result Value Ref Range    WBC 11.0 (H) 4.8 - 10.8 K/uL    RBC 3.80 (L) 4.20 - 5.40 M/uL    Hemoglobin 11.8 (L) 12.0 - 16.0 g/dL    Hematocrit 35.6 (L) 37.0 - 47.0 %    MCV 93.7 81.4 - 97.8 fL    MCH 31.1 27.0 - 33.0 pg    MCHC 33.1 (L) 33.6 - 35.0 g/dL    RDW 50.8 (H) 35.9 - 50.0 fL    Platelet Count 225 164 - 446 K/uL    MPV 11.4 9.0 - 12.9 fL    Neutrophils-Polys 65.90 44.00 - 72.00 %    Lymphocytes 22.60 22.00 - 41.00 %    Monocytes 8.90 0.00 - 13.40 %    Eosinophils 1.70 0.00 - 6.90 %    Basophils 0.60 0.00 - 1.80 %    Immature Granulocytes 0.30 0.00 - 0.90 %    Nucleated RBC 0.00 /100 WBC    Neutrophils (Absolute) 7.27 (H) 2.00 - 7.15 K/uL    Lymphs (Absolute) 2.49 1.00 - 4.80 K/uL    Monos (Absolute) 0.98 (H) 0.00 - 0.85 K/uL    Eos (Absolute) 0.19 0.00 - 0.51 K/uL    Baso (Absolute) 0.07 0.00 - 0.12 K/uL    Immature Granulocytes (abs) 0.03 0.00 - 0.11 K/uL    NRBC (Absolute) 0.00 K/uL   ESTIMATED GFR    Collection Time: 06/15/19  4:06 AM   Result Value Ref Range    GFR If African American >60 >60 mL/min/1.73 m 2    GFR If Non African American 54 (A) >60 mL/min/1.73 m 2   EC-ECHOCARDIOGRAM COMPLETE W/ CONT    Collection Time: 06/15/19 12:17 PM   Result Value Ref Range    Left Ventrical Ejection Fraction 40        Fluids    Intake/Output Summary (Last 24 hours) at 06/15/19 3696  Last data filed at  06/15/19 1859   Gross per 24 hour   Intake             1420 ml   Output             3900 ml   Net            -2480 ml       Core Measures & Quality Metrics  Labs reviewed, Medications reviewed and Radiology images reviewed  Badillo catheter: Urinary Tract Retention or Urinary Tract Obstruction      DVT Prophylaxis: Enoxaparin (Lovenox)  DVT prophylaxis - mechanical: SCDs  Ulcer prophylaxis: Yes    Assessed for rehab: Patient was assess for and/or received rehabilitation services during this hospitalization    Total Score: 9    ETOH Screening     Assessment complete date: 6/14/2019        Assessment/Plan  Anticoagulated- (present on admission)   Assessment & Plan    Plavix and Eliquis as outpatient.  Received KCentra 1958 units and Vitamin K 10 mg at referring facility.  Additional dose of KCentra 1622 units given in ICU.  TEG with platelet mapping with ADP elevation.  Platelet transfusion held.  6/14 Plavix resumed. Lovenox 40 mg daily cleared by neurosurgery, initiated.  If the CT scanning on 6/17, which is been ordered, is stable she can be fully anticoagulated with Eliquis.      Traumatic subarachnoid hemorrhage with loss of consciousness of 30 minutes or less (HCC)- (present on admission)   Assessment & Plan    Acute subarachnoid hemorrhage is moderate volume with no intra-axial hematoma or hydrocephalus. No significant mass effect.  Repeat CT scan stable. CTA without aneurysm identified.  Repeat CT scan stable.  If the CT scanning on Monday which is been ordered is stable she can be fully anticoagulated.  Non-operative management.  Post traumatic pharmacologic seizure prophylaxis for 1 week.  Speech Language Pathology cognitive evaluation.  Honey Ferguson MD. Neurosurgery.      Urinary retention   Assessment & Plan    6/13 Badillo insertion for urinary retention.  6/16 Trial badillo removal.     Syncope- (present on admission)   Assessment & Plan    Syncope at home and GLF.  Does report history of syncope since  childhood, attributed to vasovagal response. Seizures ruled out.  EKG from referring facility with atrial fibrillation; ventricular tachycardia, unsustained; left bundle branch block.  Repeat EKG with sinus rhythm, ventricular bigeminy, nonspecific IVCD with LAD and LVH with secondary repolarization abnormality.  6/14 Carotid duplex with bilateral plaque of the bifurcation extending into the internal carotid. Velocities are consistent with < 50% stenosis of the internal carotid artery. Incidental finding: Right vertebral artery lies outside of vertebrae until the mid-distal segment. Antegrade flow is observed.  Left side dampened, slightly resistive flow is observed in the vertebral artery consistent with a possible more distal obstruction  6/14 Echocardiogram: normal chamber sizes. Normal RV size and function. LV function is mild to moderate depressed. LVEF is 40% visually, worse on ectopic beats. There is apical and lateral wall hypokinesis. RVSP estimated to 30-35 mmHg. Mild mitral annular calcification.       History of MI (myocardial infarction)- (present on admission)   Assessment & Plan    Recently hospitalized at [facility] for MI.  Stent placement.  Discharge on Plavix and Eliquis.  Referring facility troponin 0.05.  Repeat troponin trend down.      Hypokalemia- (present on admission)   Assessment & Plan    Admission serum potassium 2.5.  Empiric magnesium and phosphorus replacement.  Replete and trend.      Essential hypertension- (present on admission)   Assessment & Plan    Chronic condition treated with Maxide.  Resumed maintenance medication.     CHF (congestive heart failure) (HCC)- (present on admission)   Assessment & Plan    Chronic condition treated with lasix.  Resumed maintenance medication.     UTI (urinary tract infection)- (present on admission)   Assessment & Plan    Discharged from [facility] on Cefdinir 300 mg BID for 3 days (completed 6/11).     Depression- (present  on admission)   Assessment & Plan    Chronic condition treated with seroquel and Cymbalta.  Not resumed during acute phase due to prolonged QT.  Monitor for Cymbalta withdrawal symptoms.     GERD (gastroesophageal reflux disease)- (present on admission)   Assessment & Plan    Chronic condition treated with Nexium.  Resumed maintenance medication.     Encounter for geriatric assessment- (present on admission)   Assessment & Plan    6/13 Consult placed.  Sebastián Pacheco MD, Geriatrics.     Trauma- (present on admission)   Assessment & Plan    Syncope and GLF while on blood thinners. Seen at [facility].  Trauma Red Transfer Activation.  Tyler Barrios MD. Trauma Surgery.         Discussed patient condition with RN and Patient.

## 2019-06-16 NOTE — DISCHARGE PLANNING
Aware of PMR referral from [facility]. GLF struck head - SAH. Recent history MI with cardiac stent placement. Physiatry Dr. Rickey Hernandez to consult per protocol. Thank you for the referral.

## 2019-06-16 NOTE — PROGRESS NOTES
Trauma / Surgical Daily Progress Note    Date of Service  6/16/2019    Chief Complaint  78 y.o. female admitted 6/13/2019 with Trauma    Interval Events  K replacement in process  -recheck in am    Echo with decreased Ef  -recent MI/on plavix  -expected ectopy  -no symptomatic ectopy leading to syncopal noted    Repeat head CT 6/17  -having headaches  -adjusted multimodal and added prn Fioricet     Continue bowel protocol  -last BM PTA    Rehab consult placed  -continue PT/OT    Trial badillo removal     Daily weights   Review of Systems  Review of Systems   Constitutional: Negative for chills and fever.   Respiratory: Negative for shortness of breath.    Cardiovascular: Negative for chest pain.   Gastrointestinal: Negative for abdominal pain, nausea and vomiting.        Last bowel movement PTA   Musculoskeletal: Negative for myalgias.   Neurological: Positive for headaches. Negative for sensory change and focal weakness.        Vital Signs  Temp:  [36.5 °C (97.7 °F)-37.1 °C (98.7 °F)] 36.6 °C (97.8 °F)  Pulse:  [60-87] 60  Resp:  [16-18] 16  BP: (108-133)/(53-79) 133/79  SpO2:  [90 %-100 %] 91 %    Physical Exam  Physical Exam   Constitutional: She is oriented to person, place, and time. She appears well-developed. She is cooperative. No distress.   HENT:   Head: Normocephalic.   Left facial/orbital ecchymosis/edema    Eyes: Conjunctivae are normal.   Neck: No JVD present. No tracheal deviation present.   Cardiovascular: Normal rate and intact distal pulses.    Pulmonary/Chest: Effort normal and breath sounds normal. No respiratory distress. She exhibits no tenderness.   Abdominal: Soft. She exhibits no distension.   Genitourinary:   Genitourinary Comments: Badillo to gravity with clear yellow urine    Musculoskeletal: She exhibits no edema.   Moves all extremities  Generalized aches   Neurological: She is alert and oriented to person, place, and time. GCS eye subscore is 4. GCS verbal subscore is 5. GCS motor subscore  is 6.   Skin: Skin is warm and dry.   Areas of ecchymosis to BUEs    Psychiatric: She has a normal mood and affect.   Nursing note and vitals reviewed.      Laboratory  Recent Results (from the past 24 hour(s))   EC-ECHOCARDIOGRAM COMPLETE W/ CONT    Collection Time: 06/15/19 12:17 PM   Result Value Ref Range    Left Ventrical Ejection Fraction 40    Basic Metabolic Panel    Collection Time: 06/16/19  2:53 AM   Result Value Ref Range    Sodium 136 135 - 145 mmol/L    Potassium 3.3 (L) 3.6 - 5.5 mmol/L    Chloride 96 96 - 112 mmol/L    Co2 31 20 - 33 mmol/L    Glucose 111 (H) 65 - 99 mg/dL    Bun 14 8 - 22 mg/dL    Creatinine 1.00 0.50 - 1.40 mg/dL    Calcium 9.7 8.5 - 10.5 mg/dL    Anion Gap 9.0 0.0 - 11.9   CBC with Differential: Tomorrow AM    Collection Time: 06/16/19  2:53 AM   Result Value Ref Range    WBC 11.8 (H) 4.8 - 10.8 K/uL    RBC 4.48 4.20 - 5.40 M/uL    Hemoglobin 13.7 12.0 - 16.0 g/dL    Hematocrit 40.3 37.0 - 47.0 %    MCV 90.0 81.4 - 97.8 fL    MCH 30.6 27.0 - 33.0 pg    MCHC 34.0 33.6 - 35.0 g/dL    RDW 48.4 35.9 - 50.0 fL    Platelet Count 248 164 - 446 K/uL    MPV 10.9 9.0 - 12.9 fL    Neutrophils-Polys 61.60 44.00 - 72.00 %    Lymphocytes 24.60 22.00 - 41.00 %    Monocytes 10.30 0.00 - 13.40 %    Eosinophils 2.20 0.00 - 6.90 %    Basophils 0.90 0.00 - 1.80 %    Immature Granulocytes 0.40 0.00 - 0.90 %    Nucleated RBC 0.00 /100 WBC    Neutrophils (Absolute) 7.26 (H) 2.00 - 7.15 K/uL    Lymphs (Absolute) 2.90 1.00 - 4.80 K/uL    Monos (Absolute) 1.21 (H) 0.00 - 0.85 K/uL    Eos (Absolute) 0.26 0.00 - 0.51 K/uL    Baso (Absolute) 0.11 0.00 - 0.12 K/uL    Immature Granulocytes (abs) 0.05 0.00 - 0.11 K/uL    NRBC (Absolute) 0.00 K/uL   ESTIMATED GFR    Collection Time: 06/16/19  2:53 AM   Result Value Ref Range    GFR If African American >60 >60 mL/min/1.73 m 2    GFR If Non  53 (A) >60 mL/min/1.73 m 2       Fluids    Intake/Output Summary (Last 24 hours) at 06/16/19 0897  Last data  filed at 06/15/19 1859   Gross per 24 hour   Intake             1420 ml   Output             3900 ml   Net            -2480 ml       Core Measures & Quality Metrics  Labs reviewed, Medications reviewed and Radiology images reviewed  Badillo catheter: Urinary Tract Retention or Urinary Tract Obstruction      DVT Prophylaxis: Enoxaparin (Lovenox)  DVT prophylaxis - mechanical: SCDs  Ulcer prophylaxis: Yes    Assessed for rehab: Patient was assess for and/or received rehabilitation services during this hospitalization    LAYTON Score  ETOH Screening    Assessment/Plan  Anticoagulated- (present on admission)   Assessment & Plan    Plavix and Eliquis as outpatient.  Received KCentra 1958 units and Vitamin K 10 mg at referring facility.  Additional dose of KCentra 1622 units given in ICU.  TEG with platelet mapping with ADP elevation.  Platelet transfusion held.  6/14 Plavix resumed. Lovenox 40 mg daily cleared by neurosurgery, initiated.  If the CT scanning on 6/17, which is been ordered, is stable she can be fully anticoagulated with Eliquis.      Traumatic subarachnoid hemorrhage with loss of consciousness of 30 minutes or less (HCC)- (present on admission)   Assessment & Plan    Acute subarachnoid hemorrhage is moderate volume with no intra-axial hematoma or hydrocephalus. No significant mass effect.  Repeat CT scan stable. CTA without aneurysm identified.  Repeat CT scan stable.  If the CT scanning on Monday which is been ordered is stable she can be fully anticoagulated.  Non-operative management.  Post traumatic pharmacologic seizure prophylaxis for 1 week.  Speech Language Pathology cognitive evaluation.  Honey Ferguson MD. Neurosurgery.      Urinary retention   Assessment & Plan    6/13 Badillo insertion for urinary retention.  6/16 Trial badillo removal.     Syncope- (present on admission)   Assessment & Plan    Syncope at home and GLF.  Does report history of syncope since childhood, attributed to vasovagal response.  Seizures ruled out.  EKG from referring facility with atrial fibrillation; ventricular tachycardia, unsustained; left bundle branch block.  Repeat EKG with sinus rhythm, ventricular bigeminy, nonspecific IVCD with LAD and LVH with secondary repolarization abnormality.  6/14 Carotid duplex with bilateral plaque of the bifurcation extending into the internal carotid. Velocities are consistent with < 50% stenosis of the internal carotid artery. Incidental finding: Right vertebral artery lies outside of vertebrae until the mid-distal segment. Antegrade flow is observed.  Left side dampened, slightly resistive flow is observed in the vertebral artery consistent with a possible more distal obstruction  6/14 Echocardiogram: normal chamber sizes. Normal RV size and function. LV function is mild to moderate depressed. LVEF is 40% visually, worse on ectopic beats. There is apical and lateral wall hypokinesis. RVSP estimated to 30-35 mmHg. Mild mitral annular calcification.       History of MI (myocardial infarction)- (present on admission)   Assessment & Plan    Recently hospitalized at [facility] for MI.  Stent placement.  Discharge on Plavix and Eliquis.  Referring facility troponin 0.05.  Repeat troponin trend down.      Hypokalemia- (present on admission)   Assessment & Plan    Admission serum potassium 2.5.  Empiric magnesium and phosphorus replacement.  Replete and trend.      Essential hypertension- (present on admission)   Assessment & Plan    Chronic condition treated with Maxide.  Resumed maintenance medication.     CHF (congestive heart failure) (HCC)- (present on admission)   Assessment & Plan    Chronic condition treated with lasix.  Resumed maintenance medication.     UTI (urinary tract infection)- (present on admission)   Assessment & Plan    Discharged from [facility] on Cefdinir 300 mg BID for 3 days (completed 6/11).     Depression- (present on admission)   Assessment & Plan    Chronic  condition treated with seroquel and Cymbalta.  Not resumed during acute phase due to prolonged QT.  Monitor for Cymbalta withdrawal symptoms.     GERD (gastroesophageal reflux disease)- (present on admission)   Assessment & Plan    Chronic condition treated with Nexium.  Resumed maintenance medication.     Encounter for geriatric assessment- (present on admission)   Assessment & Plan    6/13 Consult placed.  Sebastián Pacheco MD, Geriatrics.     Trauma- (present on admission)   Assessment & Plan    Syncope and GLF while on blood thinners. Seen at [facility].  Trauma Red Transfer Activation.  Tyler Barrios MD. Trauma Surgery.         Discussed patient condition with RN, Patient and trauma surgery.

## 2019-06-16 NOTE — CARE PLAN
Problem: Safety  Goal: Will remain free from falls    Intervention: Assess risk factors for falls  Bed in lowest and locked position. Bed alarm on. Patient calls appropriately. Frequent hourly rounding.       Problem: Venous Thromboembolism (VTW)/Deep Vein Thrombosis (DVT) Prevention:  Goal: Patient will participate in Venous Thrombosis (VTE)/Deep Vein Thrombosis (DVT)Prevention Measures    Intervention: Ensure patient wears graduated elastic stockings (STEPHANIE hose) and/or SCDs, if ordered, when in bed or chair (Remove at least once per shift for skin check)  Pt refusing SCDs/TEDs. Pt educated on importance. Pt on Lovenox and Plavix for DVT prophylaxis.

## 2019-06-16 NOTE — PROGRESS NOTES
Noc shift: Pt pleasant, cooperative tonight. K+ and Mg2+ repleted, will trial dc justino in am. No neuro changes.

## 2019-06-17 PROBLEM — Z02.9 DISCHARGE PLANNING ISSUES: Status: ACTIVE | Noted: 2019-01-01

## 2019-06-17 NOTE — THERAPY
"Occupational Therapy Treatment completed with focus on ADLs, ADL transfers and patient education.  Functional Status:  Pt seen for OT tx. Orthostatics taken during session w/ CNA present. While supine in bed /64. Supv supine > sit, BP 96/66. Supv LB dressing, no c/o dizziness or lightheadedness while seated EOB. Supv sit > stand, c/o being dizzy during standing w/ BP 87/56. Completed small side steps up the HOB to return to supine. Pt returned to supine and dizziness resolved. Pt requested to sit up EOB for lunch. RN and CNA aware. Pt has been up amb w/ nrsg staff as tolerated w/ assistance. Pt encouraged to continue getting up w/ staff. Will continue to see pt for OT services while in-house to increase strength, endurance and independence in ADLs and ADL transfers.    Plan of Care: Will benefit from Occupational Therapy 3 times per week  Discharge Recommendations:  Equipment Will Continue to Assess for Equipment Needs.     See \"Rehab Therapy-Acute\" Patient Summary Report for complete documentation.   "

## 2019-06-17 NOTE — PROGRESS NOTES
Monitor summary: SR:71-83, NH:.16, QRS:.14, QT:.36 with frequent PVCs, occasional couplets, rare triplets, and multiple instances of 4-5 beats of vtach per strip from monitor room.

## 2019-06-17 NOTE — PROGRESS NOTES
Bedside rounding complete.   Patient a & o x 4.  Pt badillo to be dc'd.   BUE ecchymotic, facial ecchymosis  Waffle cushion placed.  No BM since 6/13/19; pt refusing laxatives.  1 assist with the walker.   Bed alarm on, upper side rails up, bed locked and in lowest position. Call bell and belongings within reach. Communication board updated and plan of care discussed with the patient. Patient educated on hourly rounding.

## 2019-06-17 NOTE — PROGRESS NOTES
Trauma / Surgical Daily Progress Note    Date of Service  6/17/2019    Chief Complaint  78 y.o. female admitted 6/13/2019 with Trauma    Interval Events  WBC rising with decreasing GFR  -DC lasix at this time  -Retention requiring badillo placement  -Recent UTI at [place]  -Recheck UA    Afebrile    PT/OT for rehab/discharge recommendation   -nursing to page for recheck    Cleared by neurosurgery for Eliquis    Review of Systems  Review of Systems   Constitutional: Negative for chills and fever.   Respiratory: Negative for shortness of breath.    Cardiovascular: Negative for chest pain.   Gastrointestinal: Negative for abdominal pain, nausea and vomiting.        Last bowel movement 6/17   Genitourinary:        Retention    Musculoskeletal: Negative for myalgias.   Neurological: Negative for sensory change, focal weakness and headaches.        Vital Signs  Temp:  [36.4 °C (97.6 °F)-37.7 °C (99.9 °F)] 37.1 °C (98.7 °F)  Pulse:  [44-89] 44  Resp:  [16-18] 16  BP: (109-137)/(58-89) 134/89  SpO2:  [90 %-100 %] 90 %    Physical Exam  Physical Exam   Constitutional: She is oriented to person, place, and time. She appears well-developed. She is cooperative. No distress.   HENT:   Head: Normocephalic.   Left facial/orbital ecchymosis/edema    Eyes: Conjunctivae are normal.   Neck: No JVD present. No tracheal deviation present.   Cardiovascular: Normal rate and intact distal pulses.    Pulmonary/Chest: Effort normal and breath sounds normal. No respiratory distress. She exhibits no tenderness.   Abdominal: Soft. She exhibits no distension.   Genitourinary:   Genitourinary Comments: Badillo to gravity with clear yellow urine    Musculoskeletal: She exhibits no edema.   Moves all extremities  Generalized aches   Neurological: She is alert and oriented to person, place, and time. GCS eye subscore is 4. GCS verbal subscore is 5. GCS motor subscore is 6.   Skin: Skin is warm and dry.   Areas of ecchymosis to BUEs    Psychiatric: She  has a normal mood and affect.   Nursing note and vitals reviewed.      Laboratory  Recent Results (from the past 24 hour(s))   MAGNESIUM    Collection Time: 06/17/19  2:42 AM   Result Value Ref Range    Magnesium 1.8 1.5 - 2.5 mg/dL   Comp Metabolic Panel    Collection Time: 06/17/19  2:42 AM   Result Value Ref Range    Sodium 134 (L) 135 - 145 mmol/L    Potassium 3.7 3.6 - 5.5 mmol/L    Chloride 98 96 - 112 mmol/L    Co2 28 20 - 33 mmol/L    Anion Gap 8.0 0.0 - 11.9    Glucose 118 (H) 65 - 99 mg/dL    Bun 15 8 - 22 mg/dL    Creatinine 1.14 0.50 - 1.40 mg/dL    Calcium 9.4 8.5 - 10.5 mg/dL    AST(SGOT) 42 12 - 45 U/L    ALT(SGPT) 20 2 - 50 U/L    Alkaline Phosphatase 128 (H) 30 - 99 U/L    Total Bilirubin 0.9 0.1 - 1.5 mg/dL    Albumin 3.6 3.2 - 4.9 g/dL    Total Protein 6.6 6.0 - 8.2 g/dL    Globulin 3.0 1.9 - 3.5 g/dL    A-G Ratio 1.2 g/dL   CBC WITH DIFFERENTIAL    Collection Time: 06/17/19  2:42 AM   Result Value Ref Range    WBC 14.4 (H) 4.8 - 10.8 K/uL    RBC 4.63 4.20 - 5.40 M/uL    Hemoglobin 14.2 12.0 - 16.0 g/dL    Hematocrit 42.8 37.0 - 47.0 %    MCV 92.4 81.4 - 97.8 fL    MCH 30.7 27.0 - 33.0 pg    MCHC 33.2 (L) 33.6 - 35.0 g/dL    RDW 50.6 (H) 35.9 - 50.0 fL    Platelet Count 234 164 - 446 K/uL    MPV 11.5 9.0 - 12.9 fL    Neutrophils-Polys 72.70 (H) 44.00 - 72.00 %    Lymphocytes 15.70 (L) 22.00 - 41.00 %    Monocytes 10.00 0.00 - 13.40 %    Eosinophils 0.60 0.00 - 6.90 %    Basophils 0.70 0.00 - 1.80 %    Immature Granulocytes 0.30 0.00 - 0.90 %    Nucleated RBC 0.00 /100 WBC    Neutrophils (Absolute) 10.47 (H) 2.00 - 7.15 K/uL    Lymphs (Absolute) 2.25 1.00 - 4.80 K/uL    Monos (Absolute) 1.43 (H) 0.00 - 0.85 K/uL    Eos (Absolute) 0.08 0.00 - 0.51 K/uL    Baso (Absolute) 0.10 0.00 - 0.12 K/uL    Immature Granulocytes (abs) 0.04 0.00 - 0.11 K/uL    NRBC (Absolute) 0.00 K/uL   ESTIMATED GFR    Collection Time: 06/17/19  2:42 AM   Result Value Ref Range    GFR If  56 (A) >60 mL/min/1.73  m 2    GFR If Non  46 (A) >60 mL/min/1.73 m 2       Fluids    Intake/Output Summary (Last 24 hours) at 06/17/19 0838  Last data filed at 06/17/19 0816   Gross per 24 hour   Intake             1110 ml   Output             1550 ml   Net             -440 ml       Core Measures & Quality Metrics  Labs reviewed, Medications reviewed and Radiology images reviewed  Badillo catheter: Urinary Tract Retention or Urinary Tract Obstruction      DVT: eliquis.  DVT prophylaxis - mechanical: SCDs  Ulcer prophylaxis: Yes    Assessed for rehab: Patient was assess for and/or received rehabilitation services during this hospitalization    Total Score: 9    ETOH Screening     Assessment complete date: 6/14/2019        Assessment/Plan  Anticoagulated- (present on admission)   Assessment & Plan    Plavix and Eliquis as outpatient.  Received KCentra 1958 units and Vitamin K 10 mg at referring facility.  Additional dose of KCentra 1622 units given in ICU.  TEG with platelet mapping with ADP elevation.  Platelet transfusion held.  6/14 Plavix resumed. Lovenox 40 mg daily cleared by neurosurgery, initiated.  If the CT scanning on 6/17, which is been ordered, is stable she can be fully anticoagulated with Eliquis.      Traumatic subarachnoid hemorrhage with loss of consciousness of 30 minutes or less (HCC)- (present on admission)   Assessment & Plan    Acute subarachnoid hemorrhage is moderate volume with no intra-axial hematoma or hydrocephalus. No significant mass effect.  Repeat CT scan stable. CTA without aneurysm identified.  Repeat CT scan stable.  If the CT scanning on Monday which is been ordered is stable she can be fully anticoagulated.  Non-operative management.  Post traumatic pharmacologic seizure prophylaxis for 1 week.  Speech Language Pathology cognitive evaluation.  Honey Ferguson MD. Neurosurgery.      Urinary retention   Assessment & Plan    6/13 Badillo insertion for urinary retention.  6/16 Trial badillo  removal.  6/17 Replaced for retrention     UTI (urinary tract infection)- (present on admission)   Assessment & Plan    Discharged from [facility] on Cefdinir 300 mg BID for 3 days (completed 6/11).  6/17 Repeat UA- Increasing WBC with decreasing GFR     Syncope- (present on admission)   Assessment & Plan    Syncope at home and GLF.  Does report history of syncope since childhood, attributed to vasovagal response. Seizures ruled out.  EKG from referring facility with atrial fibrillation; ventricular tachycardia, unsustained; left bundle branch block.  Repeat EKG with sinus rhythm, ventricular bigeminy, nonspecific IVCD with LAD and LVH with secondary repolarization abnormality.  6/14 Carotid duplex with bilateral plaque of the bifurcation extending into the internal carotid. Velocities are consistent with < 50% stenosis of the internal carotid artery. Incidental finding: Right vertebral artery lies outside of vertebrae until the mid-distal segment. Antegrade flow is observed.  Left side dampened, slightly resistive flow is observed in the vertebral artery consistent with a possible more distal obstruction  6/14 Echocardiogram: normal chamber sizes. Normal RV size and function. LV function is mild to moderate depressed. LVEF is 40% visually, worse on ectopic beats. There is apical and lateral wall hypokinesis. RVSP estimated to 30-35 mmHg. Mild mitral annular calcification.       History of MI (myocardial infarction)- (present on admission)   Assessment & Plan    Recently hospitalized at [facility] for MI.  Stent placement.  Discharge on Plavix and Eliquis.  Referring facility troponin 0.05.  Repeat troponin trend down.      Hypokalemia- (present on admission)   Assessment & Plan    Admission serum potassium 2.5.  Empiric magnesium and phosphorus replacement.  Replete and trend.      Essential hypertension- (present on admission)   Assessment & Plan    Chronic condition treated with  Maxide.  Resumed maintenance medication.     History of CHF (congestive heart failure)- (present on admission)   Assessment & Plan    Chronic condition treated with lasix. EF 40%  Resumed maintenance medication.     Depression- (present on admission)   Assessment & Plan    Chronic condition treated with seroquel and Cymbalta.  Not resumed during acute phase due to prolonged QT.  Monitor for Cymbalta withdrawal symptoms.     GERD (gastroesophageal reflux disease)- (present on admission)   Assessment & Plan    Chronic condition treated with Nexium.  Resumed maintenance medication.     Encounter for geriatric assessment- (present on admission)   Assessment & Plan    6/13 Consult placed.  Sebastián Pacheco MD, Geriatrics.     Trauma- (present on admission)   Assessment & Plan    Syncope and GLF while on blood thinners. Seen at [facility].  Trauma Red Transfer Activation.  Tyler Barrios MD. Trauma Surgery.         Discussed patient condition with Family, RN, Patient and trauma surgery.

## 2019-06-17 NOTE — RESPIRATORY CARE
COPD EDUCATION by COPD CLINICAL EDUCATOR  6/17/2019 at 6:26 AM by Shirley Dennison     Patient reviewed by COPD education team. Patient does not qualify for the COPD program.

## 2019-06-17 NOTE — PROGRESS NOTES
RN MOBILITY NOTE     Surgery patient?: no  Date of surgery: n/a  Ambulated 50 ft on day of surgery? (N/A if today is not date of surgery): n/a  Number of times ambulated 50 feet or greater today: 0  Patient has been up to chair, edge of bed or HOB 90 degrees for all meals?: yes  Goal met? (goal is ambulating at least 50 feet 2 times on day shift, one time on night shift): no  If patient did not meet mobility goal, why?: pt refused ambulation, education provided, nausea

## 2019-06-17 NOTE — PROGRESS NOTES
RN MOBILITY NOTE     Surgery patient?: no  Date of surgery: n/a3  Ambulated 50 ft on day of surgery? (N/A if today is not date of surgery): n/a  Number of times ambulated 50 feet or greater today: 3  Patient has been up to chair, edge of bed or HOB 90 degrees for all meals?: yes  Goal met? (goal is ambulating at least 50 feet 2 times on day shift, one time on night shift): yes  If patient did not meet mobility goal, why?: n/a

## 2019-06-17 NOTE — CARE PLAN
Problem: Venous Thromboembolism (VTW)/Deep Vein Thrombosis (DVT) Prevention:  Goal: Patient will participate in Venous Thrombosis (VTE)/Deep Vein Thrombosis (DVT)Prevention Measures    Intervention: Ensure patient wears graduated elastic stockings (STEPHANIE hose) and/or SCDs, if ordered, when in bed or chair (Remove at least once per shift for skin check)  Pt refusing SCDs. Primary aware. Pt was on Lovenox. Lovenox dc'd after am dose. Pt to be restarted on Eliquis tomorrow. Pt educated on importance of SCDs but still refusing.       Problem: Bowel/Gastric:  Goal: Will not experience complications related to bowel motility    Intervention: Assess baseline bowel pattern  Pt had a medium, brown, soft BM today. Abd soft, non-distended. Active bowel sounds. Pt c/o of nausea in the a.m relieved by compazine.

## 2019-06-17 NOTE — THERAPY
"Physical Therapy Treatment completed.   Bed Mobility:  Supine to Sit: Supervised  Transfers: Sit to Stand: Supervised (from EOB->FWW)  Gait: Level Of Assist: Minimal Assist with Front-Wheel Walker       Plan of Care: Will benefit from Physical Therapy 3 times per week  Discharge Recommendations: Equipment: Will Continue to Assess for Equipment Needs. Post-acute therapy Discharge to a transitional care facility for continued skilled therapy services.    Pt w/orthostatic BP during her PT session  Supine w/HOB elevated: 119/69   Sit: 103/64 HR 83  Standin/63  During amb (75'): 83/53, w/increase unsteadiness/aphasia HR in low 100's  Nrsg notified of results.     See \"Rehab Therapy-Acute\" Patient Summary Report for complete documentation.       "

## 2019-06-17 NOTE — PROGRESS NOTES
Neurosurgery Progress Note    Subjective:  Awake and conversant  Some nausea, generally in the mornings  Rehab in the works    Exam:  Awake, alert   Speech fluent appropriate  In no apparent distress  Affect, mood appropriate  Oriented x 3  Pupils 3 mm midline, reactive.  Conjugate gaze.  Visual fields full to confrontation  Face symmetric  Tongue midline without fasciculation  Facial sensation intact light touch  Hearing intact to conversation, light finger rub bilaterally  Motor:  Bilateral SCM, shoulder shrug, deltoid, bicep, tricep, , wrist extension, hand intrinsics                IP, thigh adduction, thigh abduction, quadriceps, hamstrings, dorsiflexion,                Plantar flexion, foot inversion, foot eversion, EHL 5/5 no pronator drift  Sensation:  Intact touch face, neck, torso, four extremities  Reflex:  No Hahn's no clonus  Finger-nose-finger, STEPHEN unremarkable  Bruised everywhere.     BP  Min: 109/65  Max: 137/58  Pulse  Av.4  Min: 44  Max: 89  Resp  Av  Min: 16  Max: 18  Temp  Av °C (98.6 °F)  Min: 36.4 °C (97.6 °F)  Max: 37.7 °C (99.9 °F)  SpO2  Av.6 %  Min: 90 %  Max: 100 %    No Data Recorded    Recent Labs      06/15/19   0406  19   0253  19   0242   WBC  11.0*  11.8*  14.4*   RBC  3.80*  4.48  4.63   HEMOGLOBIN  11.8*  13.7  14.2   HEMATOCRIT  35.6*  40.3  42.8   MCV  93.7  90.0  92.4   MCH  31.1  30.6  30.7   MCHC  33.1*  34.0  33.2*   RDW  50.8*  48.4  50.6*   PLATELETCT  225  248  234   MPV  11.4  10.9  11.5     Recent Labs      06/15/19   0406  19   0253  19   0242   SODIUM  137  136  134*   POTASSIUM  2.9*  3.3*  3.7   CHLORIDE  100  96  98   CO2  28  31  28   GLUCOSE  106*  111*  118*   BUN  16  14  15   CREATININE  0.99  1.00  1.14   CALCIUM  8.8  9.7  9.4               Intake/Output       19 0700 - 06/1719 -  Total 1900-0659 Total       Intake    P.O.  940  56 864   --  -- --    P.O. 940 50 990 -- -- --    Total Intake 940 50 990 -- -- --       Output    Urine  --  650 650  --  -- --    Output (mL) (Urethral Catheter 16 Fr.) -- 650 650 -- -- --    Total Output -- 650 650 -- -- --       Net I/O     940 -600 340 -- -- --            Intake/Output Summary (Last 24 hours) at 06/17/19 0748  Last data filed at 06/17/19 0400   Gross per 24 hour   Intake              990 ml   Output              650 ml   Net              340 ml       $ Bladder Scan Results (mL): 671    • metaxalone  800 mg BID   • acetaminophen/caffeine/butalbital 325-40-50 mg  1 Tab Q6HRS PRN   • enoxaparin (LOVENOX) injection  40 mg DAILY   • clopidogrel  75 mg DAILY   • furosemide  20 mg DAILY   • triamterene-hydrochlorothiazide  0.5 Tab BID   • ipratropium-albuterol  3 mL Q4HRS PRN   • levETIRAcetam  500 mg BID   • omeprazole  20 mg DAILY   • ALPRAZolam  0.25 mg QDAY PRN   • acetaminophen  650 mg Q6HRS PRN   • Respiratory Care per Protocol   Continuous RT   • docusate sodium  100 mg BID   • senna-docusate  1 Tab Nightly   • senna-docusate  1 Tab Q24HRS PRN   • polyethylene glycol/lytes  1 Packet BID   • magnesium hydroxide  30 mL DAILY   • bisacodyl  10 mg Q24HRS PRN   • fleet  1 Each Once PRN   • oxyCODONE immediate-release  2.5 mg Q3HRS PRN   • oxyCODONE immediate-release  5 mg Q3HRS PRN   • acetaminophen  650 mg Q4HRS PRN   • prochlorperazine  5 mg Q6HRS PRN   • Pharmacy Consult Request  1 Each PHARMACY TO DOSE   • MD ALERT...DO NOT ADMINISTER NSAIDS or ASPIRIN unless ORDERED By Neurosurgery  1 Each PRN   • dexamethasone  4 mg Once PRN   • scopolamine  1 Patch Q72HRS PRN   • labetalol  10 mg Q HOUR PRN   • hydrALAZINE  10 mg Q HOUR PRN     CT overnight improved      Assessment and Plan:  Hospital day #4  POD #0  Prophylactic anticoagulation:yes       Start date/time: today - cleared to resume eliquis 6/17       Plan:  1. Q4hrly neuro checks  2. Restart eliquis  3. Rehab vs home tomorrow if medically stable  4.  Repeat head CT in 2 weeks.

## 2019-06-17 NOTE — PROGRESS NOTES
Geriatric Progress Note    Chief Complaint: Traumatic Subarachnoid Hemorrhage following syncope while on apixaban and plavix.      Today's Date: 6/17/2019  Patient Name: Thea Kessler  Current Attending: Tyler Barrios M.D.    HPI:   Thea is a 78 y.o. Female who fell June 13 2019 following a syncopal episode causing a Traumatic Subarachnoid Hemorrhage while on apixaban and plavix. In ER, found K to be 2.5.  She received Kcentra (4F-PCC) and Vitamin K at [facility] before being transferred to [facility] ER and received another dose of Kcentra in ICU.        Patient had been recently treated for unprovoked PE about 6 weeks ago (started on Apixaban) and then MI 10 days prior to admission, with RCA stent placement plus stent on left as well as per patient - and started on clopdiogrel for the stents (in addition to Apixaban). Prior to discharge, she began treatment for UTI June 11 with Cefdinir 300mg BID (for 3 day period), but did not take the medication as she was concerned about an allergy and she could not get a hold of her PCP for a new Rx.     Hx (read initial consult) appears to describe true syncopal episode, possibly cardiac in nature. Remote hx of ? Vaso-vagal episodes but none in recent years.      Patient has been having ongoing intermittent bradycardia, ectopic beats, unsustained VT (~ 4beats at a time), interventricular conduction block (?LBBB) Afib (occsionally), and possibly first degree AV block. HR at times at 40BPM.  QTc has been significantly prolonged. (home meds included seroquel 25 and duloxetine - both held for now)    Patient just quit smoking, does not drink alcohol, takes Alprazolam 0.5mg (nightly for > 5 years),    Patient reports chronic mild facial droop on right side. 25 pound weight loss - unintentional over last 4-5 months 2/2 loss of appetite with screenings UTD and negative. 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    Records from  [facility] have not been received yet.     Subjective:  24 hour Events / Patient Reports: Patient doing well. Had BM this morning (not soft or loose). Daughter with patient noting down results I reported to her. Other daughter flying in, will be taking over mother's care this afternoon. Patient's headache persists but mild. Feeling generally well. Patient was unable to void after badillo was removed so it was placed back. Reports UTI was never treated but when left hospital, no  symptoms. U/A was taken this morning after labs revealed elevated WBC (14) but were negative. Patient and daughter describe numerous near syncopal episodes while walking or sitting on toilet today and yesterday. Patient also feeling somewhat 'off' (perhaps in relation to withdrawal of her SNRI).      HR continues to go down to a low of 40BPM - last vitals with HR of 44 had normal BP associated.   Patient began PT/OT over weekend, and family requesting rehab stay and then will return home with daughter  CT Head this AM showed improvement of SAH, no acute changes. Apixaban will be restarted today. Already restarted Plavix.  Carotids <50% stenosis; R vertebral artery is outside vertebrae.  Echo revealed  Expected LVEF 40% with apical and lateral wall hypokinesis. RVSP 30-35 mmHg  Patient remains on Telemetry.    Activity Level:   Light    Activities of Daily Living:   Some assistance required at this time   - This is not a reflection of patient's normal baseline which is more independent.    ROS: The items below were reported to be negative, unless otherwise noted above or is in bold type.   Constipation - First nromal BM this morning - continuing laxaties.  Diarrhea  Urination  - Urinary retention when trial void after badillo removed - U/A pending.  Pain  SOB  Fluctuation of mental status   Vision   Hearing  Nausea  Vomiting  Motor issues  Sensory issues  Memory issues      Objective:  Physical Exam:   /89   Pulse (!) 44   Temp 37.1  "°C (98.7 °F) (Temporal)   Resp 16   Ht 1.791 m (5' 10.5\")   Wt 81.6 kg (179 lb 14.3 oz)   SpO2 90%   Breastfeeding? No   BMI 25.45 kg/m²     Intake/Output Summary (Last 24 hours) at 06/17/19 1008  Last data filed at 06/17/19 0816   Gross per 24 hour   Intake             1110 ml   Output             1550 ml   Net             -440 ml     General: appears than stated age. Alert and fully oriented.   HEENT: Left ecchymosis resolving.  CV: Irr Irregular and bradycardic (roughly 45-55 on auscultation), some full beats, some minimally effective beats and some brief pauses both on aucultation and palpating pulse.   Lungs: normal effort, Good air entry bilaterally.    Neurological Exam: right facial droop remains present and patient has chronic left visual deficit from 'stroke' (per patient's history)   Psych: normal mood and affect, does not appear to be responding to internal stimuli. Patient stating she feels confused but her examples are more consistent with simply being confused/unclear about the plan for the next few days.   Delirium Screen: Patient was able to say days of the week forwards and backwards. Good attention. No fluctuations. No altered LOC.   Orientation: GOOD  Attention: GOOD  Gait: N/A     Avalos in place. Urine clear and darker yellow.    Labs:    Lab Results   Component Value Date/Time    WBC 14.4 (H) 06/17/2019 02:42 AM    RBC 4.63 06/17/2019 02:42 AM    HEMOGLOBIN 14.2 06/17/2019 02:42 AM    HEMATOCRIT 42.8 06/17/2019 02:42 AM    MCV 92.4 06/17/2019 02:42 AM    MCH 30.7 06/17/2019 02:42 AM    MCHC 33.2 (L) 06/17/2019 02:42 AM    MPV 11.5 06/17/2019 02:42 AM    NEUTSPOLYS 72.70 (H) 06/17/2019 02:42 AM    LYMPHOCYTES 15.70 (L) 06/17/2019 02:42 AM    MONOCYTES 10.00 06/17/2019 02:42 AM    EOSINOPHILS 0.60 06/17/2019 02:42 AM    BASOPHILS 0.70 06/17/2019 02:42 AM      Lab Results   Component Value Date/Time    SODIUM 134 (L) 06/17/2019 02:42 AM    POTASSIUM 3.7 06/17/2019 02:42 AM    CHLORIDE 98 " 06/17/2019 02:42 AM    CO2 28 06/17/2019 02:42 AM    GLUCOSE 118 (H) 06/17/2019 02:42 AM    BUN 15 06/17/2019 02:42 AM    CREATININE 1.14 06/17/2019 02:42 AM        Imaging:  Ct Head: June 17  Impression       1.  Decreased subarachnoid blood  2.  Atrophy  3.  White matter changes     Echo June 14 2019    CONCLUSIONS  Technically difficult but adequate study for interpretation. Normal   chamber sizes. Normal RV size and function. LV function is mild to   moderate depressed. LVEF is 40% visually, worse on ectopic beats. There   is apical and lateral wall hypokinesis. RVSP estimated to 30-35 mmHg.   Mild mitral annular calcification.    Carotids:    Velocities    are consistent with < 50% stenosis of the internal carotid artery.      Incidental finding: Vertebral artery lies outside of vertebrae until the    mid-distal segment. Antegrade flow is observed.    CTA June 13 2019    FINDINGS:  The posterior circulation shows the distal vertebral arteries to be patent. The vertebrobasilar confluence is intact. The basilar artery is patent. No aneurysm or occlusive lesion is evident.    The anterior circulation shows no stenotic or occlusive lesion. No aneurysm is evident about the [place].    Redemonstration of bilateral subarachnoid hemorrhage.    EKG: Ordered for today. Previous >550 QTc with number of ectopics AF and ? LBBB    Assessment: 78F recent unprovoked PE and RCA MI (10 days prior to admission) presented with syncopal episode causing GLF with traumatic SAH on apixaban/plavix stable/resolving. Patient near-syncopal since arrival when sitting up / standing for example, collapsed in daughter's arms while sitting on toilet with persistent significant bradycardia.    Problem List:  Syncope: DDx include hypotension 2/2 decreased PO intake, UTI, vasovagal - given recent MI, Right Coronary stent, and prolonged QTc- sustained arrhythmia (jordan or tachy)  t-SAH   Recent MI  Recent unprovoked PE  Recent  unintentional weight loss  Facial Nerve Defect  Prevention of Delirium  Depression  Prolonged QTc   AF  Remote Stroke (?)  Active Smoker      Recommendations:  1) Orthostatic Vitals; will discontinue Triamterene-HCTZ (used primarily for left leg edema as per patient history).  2) Cardiology Consult due to symptomatic bradycardia post-MI (? patient may need EP studies)  3) Placed EKG x 3 days (placed)  4) Monitor QTc - if normalized, may restart Cymbalta with caution.  5) DC scopolamine, metaxalone, oxycodone, and add PO copazine (changed) and add ODT Zofran  Continue Telemetry  Obtain Hospital Records (of both PE admission and MI)  Discuss with patient regarding intolerance to statin (+ review latest Rx from discharge)  Monitor Leukocytosis   Eventual MRI head to assess stroke history  Possible malignancy work up (due to unintentional weight loss, unprovoked PE, heavy smoking history)  Restarting apixaban tomorrow   Rehab    Place stop date to keppra after 7 days    Interventions to be considered in all patients in order to minimize the risk of delirium.   -do not disturb patient (vitals or lab draws) between the hours of 10 PM and 6 AM.  -ideally the patient should not sleep during the day and we should avoid day time naps.   -up in chair for meals  -ambulate at least three times daily, as able  -watch for constipation  -timed voiding - ask patient is she would like to go to the bathroom q 2-3 hours, except during the do not disturb hours.   -remove all unnecessary lines (central lines, peripheral IVs, feeding tubes, badillo catheters)  - recommend against use of restraints - either chemical or physical (antipsychotics)   -minimize polypharmacy, if possible, medications should not be dosed during sleep hours    We will continue to follow that patient along with you  Jose Daniel Gayle M.D.  Geriatrics- [facility]  Please feel free to contact us with any questions.  Extension [phone]   8:00-5:00 Monday - Friday (excluding  holidays)

## 2019-06-17 NOTE — DISCHARGE PLANNING
Anticipated Discharge Disposition:   Home to daughters with home health or SNF    Action:    Spoke with patient.  She stated that she was renting a home in [place] and was raising her 8 yoa great granddaughter.  She was also planning to adopt her.  Pt was independent with ADLs and IADLs.  Pt AAOx4.  Recall of information slow at times to questions.  Pt has contusions to left eye and right arm.  Patient reported that she receives $1,812.00 per month from social security + money for great granddaughter.  Pt stated she plans to move in with her daughter Betty Zaman for as long is needed or permanently with great granddaughter.  Home is single level and has steps/stairs to enter into home.  Pt stated she will need FWW to be safe.  Pt stated her daughters will be here after 3pm today.    OT eval today, supervision for dressing, sit to stand.    Tiger text to [facility] and Dr. Delarosa recommending home health or SNF.    Barriers to Discharge:    Medical clearance    Plan:    Speak with patient and patient's daughters.    Care Transition Team Assessment    Information Source  Orientation : Oriented x 4  Information Given By: Patient  Informant's Name: Thea Kessler  Who is responsible for making decisions for patient? : Patient    Readmission Evaluation  Is this a readmission?: Yes - unplanned readmission    Elopement Risk  Legal Hold: No  Ambulatory or Self Mobile in Wheelchair: Yes  Disoriented: No  Psychiatric Symptoms: None  History of Wandering: No  Elopement this Admit: No  Vocalizing Wanting to Leave: No  Displays Behaviors, Body Language Wanting to Leave: No-Not at Risk for Elopement  Elopement Risk: Not at Risk for Elopement    Interdisciplinary Discharge Planning  Lives with - Patient's Self Care Capacity: Child Less than 18 Years of Age  Patient or legal guardian wants to designate a caregiver (see row info): No  Housing / Facility: 2 Story Apartment / Condo  Prior Services: None    Discharge  Preparedness  What is your plan after discharge?: Other (comment)  What are your discharge supports?: Child  Prior Functional Level: Ambulatory, Drives Self, Independent with Activities of Daily Living, Independent with Medication Management  Difficulity with ADLs: Bathing, Dressing, Toileting, Walking  Difficulity with IADLs: Cooking, Driving, Laundry, Keeping track of finances, Managing medication, Shopping    Functional Assesment  Prior Functional Level: Ambulatory, Drives Self, Independent with Activities of Daily Living, Independent with Medication Management    Finances  Financial Barriers to Discharge: No  Prescription Coverage: Yes    Vision / Hearing Impairment  Vision Impairment : Yes  Right Eye Vision: Impaired, Wears Glasses  Left Eye Vision: Impaired, Wears Glasses  Hearing Impairment : No         Advance Directive  Advance Directive?: None    Domestic Abuse  Have you ever been the victim of abuse or violence?: No  Physical Abuse or Sexual Abuse: No  Verbal Abuse or Emotional Abuse: No  Possible Abuse Reported to:: Not Applicable         Discharge Risks or Barriers  Discharge risks or barriers?: No    Anticipated Discharge Information  Anticipated discharge disposition: Acute rehab  Discharge Contact Phone Number: [phone]

## 2019-06-17 NOTE — PROGRESS NOTES
1215 Attempted to s/w Lyudmila (Trauma NP) but she is in the OR to give UA results and orthostatic results.      1400 Lyudmila notified of UA, orthostatic BP and ekg results. Lasix dc'd.

## 2019-06-17 NOTE — ASSESSMENT & PLAN NOTE
Date of admission: 6/13/2019  Date:6/14 Transfer orders from SICU  Date: 6/16 Rehab/SNF consult   Date: 6/18 Accepted to [facility]  Date: 6/21 Palliative care consult   Cleared for discharge: No  Discharge delayed: No    Discharge date:

## 2019-06-17 NOTE — PROGRESS NOTES
1845- report given by day NORY Motta, POC discussed, pt received awake and alert sitting up in bed, grandkids at bedside, no distress, no complaints of pain, bed alarm on and locked and lowest position, scd's refused education provided, call light in reach, hourly rounding in place    2110- pt complains of nausea and anxiety about course of illness, meds given see MAR    2200-pt reports nausea subsided    0250-pt to CT with transport, tele box off, monitor room notified    0330-pt back from CT, no complaints of pain, tele back on

## 2019-06-17 NOTE — CARE PLAN
Problem: Venous Thromboembolism (VTW)/Deep Vein Thrombosis (DVT) Prevention:  Goal: Patient will participate in Venous Thrombosis (VTE)/Deep Vein Thrombosis (DVT)Prevention Measures    Intervention: Ensure patient wears graduated elastic stockings (STEPHANIE hose) and/or SCDs, if ordered, when in bed or chair (Remove at least once per shift for skin check)  Pt refusing scd's, education provided about risks and pt encouraged to perform exercises with her legs every hour while awake

## 2019-06-17 NOTE — PROGRESS NOTES
Monitor summary: SR:66-74, GA:.18, QRS:.12, QT:.42 with frequent PVCs, frequent trigem, frequent couplets, and frequent triplets per strip from monitor room.

## 2019-06-17 NOTE — CARE PLAN
Problem: Infection  Goal: Will remain free from infection  Outcome: PROGRESSING AS EXPECTED  Hand hygiene reinforcement completed

## 2019-06-18 PROBLEM — Z86.79 HISTORY OF CHF (CONGESTIVE HEART FAILURE): Status: RESOLVED | Noted: 2019-01-01 | Resolved: 2019-01-01

## 2019-06-18 NOTE — PROGRESS NOTES
This RN contacted Trauma noc on all in regards to new EKG result of Left BBB, per keyla RICHARDSON to keep pt on neurosurgery floor with remote cardiac monitor.

## 2019-06-18 NOTE — CARE PLAN
Problem: Safety  Goal: Will remain free from injury  Outcome: PROGRESSING AS EXPECTED      Problem: Knowledge Deficit  Goal: Knowledge of disease process/condition, treatment plan, diagnostic tests, and medications will improve  Outcome: PROGRESSING AS EXPECTED  Pt educated about the need for HOB elevated to 30 degrees, pt understands need

## 2019-06-18 NOTE — PROGRESS NOTES
Report received from night shift RN, care assumed. Pt is sleeping at this time, breathing unlabored, body relaxed. No signs of distress at this time.Bed locked in lowest position, bed alarm on. Call light and personal belongings within reach.    1830: pt expressive aphasia worsening at this time, pt has trouble expressing simple needs. Trauma APRN notified, STAT CT head wo ordered per Lyudmila JACOBO at this time.     1845: Pt out of the room via hospital bed to CT.

## 2019-06-18 NOTE — PREADMISSION SCREENING NOTE
Pre-Admission Screening Form    Patient Information:   Name: Thea Kessler     MRN: [id number]       : 1940      Age: 78 y.o.   Gender: female      Race: White [7]       Marital Status:  [5]  Family Contact: Brianne Zaman        Relationship: Daughter [2]  Home Phone: [phone]           Cell Phone:   Advanced Directives: None  Code Status:  FULL  Current Attending Provider: Tyler Barrios M.D.  Referring Physician: LALITHA Fuentes   Physiatrist Consult: Dr. Hernandez    Referral Date: 19  Primary Payor Source:  MEDICARE  Secondary Payor Source:  MISCELLANEOUS    Medical Information:   Date of Admission to Acute Care [facility]2019  Room Number: S139/00  Rehabilitation Diagnosis:  Traumatic, Closed Injury  Immunization History   Administered Date(s) Administered   • Pneumococcal Conjugate Vaccine (Prevnar/PCV-13) 2019     Allergies   Allergen Reactions   • Lipitor [Atorvastatin Calcium]      And related meds   • Pcn [Penicillins] Hives and Swelling   • Sulfa Drugs Hives and Swelling     Past Medical History:   Diagnosis Date   • Arthritis    • Depression    • GERD (gastroesophageal reflux disease)    • Hypokalemia    • Swelling     Left side of body, pt states will swell on Left side unless taking Maxide     Past Surgical History:   Procedure Laterality Date   • APPENDECTOMY     • CHOLECYSTECTOMY     • HEMORRHOIDECTOMY         History Leading to Admission, Conditions that Caused the Need for Rehab (CMS):     Dr. Barrios H&P:  CHIEF COMPLAINT: GLF with head injury and intracranial hemorrhage     HISTORY OF PRESENT ILLNESS: The patient is a 78 year old   Female who experienced a syncopal episode and fell.  She struck her head.  Brief LOC.  Evaluated at [facility] where CT showed a subarachnoid hemorrhage.    Recent cardiac stents, she is on Eliquis. And Plavix.   Kcentra and vit. K at [facility].    The patient was triaged as a trauma red    activation in accordance with established pre  hospital protocols.  Upon arrival,   primary and secondary surveys with required adjuncts were performed.  Arrived here with GCS 14.  Neurosurgery has reviewed images.  Admit sicu.  TEG is pending to evaluate platelet function.     IMPRESSION AND PLAN:  Hypokalemia  Admission serum potassium 2.5.  Empiric magnesium and phosphorus replacement.  Replete and trend.     History of MI (myocardial infarction)  Recently hospitalized at [facility] for MI.  Stent placement.  Discharge on Plavix and Eliquis.  Referring facility troponin 0.05.  Repeat troponin ordered.     Syncope  Syncope at home and GLF.  EKG from referring facility with atrial fibrillation; ventricular tachycardia, unsustained; left bundle branch block.  Repeat EKG pending.     Anticoagulated  Plavix and Eliquis as outpatient.  Received KCentra 1958 units and Vitamin K 10 mg at referring facility.  Insufficent does of KCentra at referring facility, additional dose given in ICU.  TEG with platelet mapping pending.  Hold off on platelet transfusion until TEG resulted.     Depression  Chronic condition treated with Xanax.  Resumed maintenance medication.     Contraindication to deep vein thrombosis (DVT) prophylaxis  Systemic anticoagulation contraindicated secondary to elevated bleeding risk.  6/14 Surveillance venous duplex scanning ordered.     Traumatic subarachnoid hemorrhage with loss of consciousness of 30 minutes or less (HCC)  Acute subarachnoid hemorrhage is moderate volume with no intra-axial hematoma or hydrocephalus. No significant mass effect.  Definitive plan pending.  Post traumatic pharmacologic seizure prophylaxis for 1 week.  Speech Language Pathology cognitive evaluation.  Honey Ferguson MD. Neurosurgery.     Trauma  Syncope and GLF while on blood thinners. Seen at [facility].  Trauma Red Transfer Activation.  Tyler Barrios MD. Trauma Surgery.     Encounter for geriatric assessment  6/13 Consult placed.     UTI (urinary  tract infection)  Discharged from [facility] on Cefdinir 300 mg BID for 3 days (completed 6/11).     Depression  Chronic condition treated with Cymbalta 60 mg daily.     CHF (congestive heart failure) (HCC)  Chronic condition treated with Lasix 20 mg daily.    Dr. Hernandez (Physiatry) recommendations:  Patient is a 78 y.o. female  with a past medical history of  admitted to [facility] on 06/13/2019 by ambulance for evaluation a syncopal event.  She had a ground level fall and hit her head on 06/13/2019.  She was seen at [facility], diagnosed with a subarachnoid hemorrhage, evaluated by Dr. Ferguson and then transferred to [facility].     The patient was treated at [facility] for a myocardial infarction about 10 days prior to this event and was started on plavix and Eliquis.  Dr. Ferguson recommended reversal of anticoagulation and platelet therapy.  She received KCentra and vitamin K 10mg.  A second administration of KCentra was given in the ICU   During transfer to [facility], she was hypotensive and had headache, slight confusion and nausea upon arrival to the ED.     She has been managed with nonoperative treatment for the SAH with one week of prophylactic keppra.  She has been started on Lovenox with plan to return to [facility] depending on the results of her CT on 06/17/2019.     Patient with multiple co-morbidities(including but not limited to urinary retention with recently treated UTI Hypokalemia, CHF, GERD, depression, recent MI s/p stent placement, arthritis, pulmonary embolism about 6 weeks prior to this event); with cognitive deficits and functional deficits in mobility/self-cares/swallowing/speech, and mild de-conditioning.      Pre-morbidly, this patient lived in a two level condo, although it appears that she will be moving and staying with her daughter.  The daughter works in the daytime.  The patient was evaluated by acute care Physical Therapy, Occupational Therapy and  Speech Language Pathology; currently requiring supervision assistance for mobility and supervision/ contact-guard assistance for ADLs, also with ongoing cognitive deficits.      The patient is an excellent candidate for an acute inpatient rehabilitation program with a coordinated program of care at an intensity and frequency not available at a lower level of care.      Note: if the patient continues to progress while waiting for medical clearance, and no longer requires 2 out of 3 therapy services (PT/OT/SLP), then the patient would no longer meet the criteria for acute inpatient rehabilitation.     This recommendation is substantiated by the patient's current medical condition with intervention and assessment of medical issues requiring an acute level of care for patient's safety and maximum outcome. The team will follow-up and re-assess patient's progress and updated OT/PT/SLP therapies to determine how the patient's needs can be best met.   We will continue to follow with you.      Dr. Ferguson (Neuro Surgery) recommendations:  Assessment/Plan  No new Assessment & Plan notes have been filed under this hospital service since the last note was generated.  Service: Surgery Neurosurgery     Impression     1.  Closed head injury with traumatic subarachnoid hemorrhage on Plavix and Eliquis.  Recommendations 101 reversal.  As discussed with Dr. Barrios     2.  Repeat CT scan in 6 hours with a CT angiogram     3.  Repeat CT scan in the morning if this is stable she can start on Lovenox 40 mg daily     4.  If the CT scanning on Monday which is been ordered is stable she can be fully anticoagulated     Head CT 06-13-19:  Acute subarachnoid hemorrhage is moderate volume with no intra-axial hematoma or hydrocephalus identified    No significant mass effect    Moderate atrophy and white matter disease    Co-morbidities: See PMH  Potential Risk - Complications: Aphasia, Cognitive Impairment, Contractures, Deep Vein Thrombosis,  "Dysphagia, Incontinence, Malnutrition, Pain, Perceptual Impairment, Pneumonia, Pressure Ulcer, Seizures and Urinary Tract Infection  Level of Risk: High    Ongoing Medical Management Needed (Medical/Nursing Needs):   Patient Active Problem List    Diagnosis Date Noted   • Traumatic subarachnoid hemorrhage with loss of consciousness of 30 minutes or less (HCC) 06/13/2019     Priority: High   • Anticoagulated 06/13/2019     Priority: High   • Discharge planning issues 06/17/2019     Priority: Medium   • Urinary retention 06/14/2019     Priority: Medium   • Hypokalemia 06/13/2019     Priority: Medium   • History of MI (myocardial infarction) 06/13/2019     Priority: Medium   • Syncope 06/13/2019     Priority: Medium   • UTI (urinary tract infection) 06/13/2019     Priority: Medium   • Trauma 06/13/2019     Priority: Low   • Encounter for geriatric assessment 06/13/2019     Priority: Low   • GERD (gastroesophageal reflux disease) 06/13/2019     Priority: Low   • Arthritis 06/13/2019     Priority: Low   • Depression 06/13/2019     Priority: Low   • History of CHF (congestive heart failure) 06/13/2019     Priority: Low   • Essential hypertension 06/13/2019     Priority: Low     Alert with expressive aphasia and impulsivity    Current Vital Signs:   Temperature: 37.4 °C (99.3 °F) Pulse: (!) 51 Respiration: 16 Blood Pressure : (!) 99/73  Weight: 81.6 kg (179 lb 14.3 oz) Height: 179.1 cm (5' 10.5\")  Pulse Oximetry: 93 % O2 (LPM): 0      Completed Laboratory Reports:  Recent Labs      06/16/19   0253  06/17/19   0242  06/18/19   0235   WBC  11.8*  14.4*  12.8*   HEMOGLOBIN  13.7  14.2  14.5   HEMATOCRIT  40.3  42.8  43.6   PLATELETCT  248  234  225   SODIUM  136  134*  133*   POTASSIUM  3.3*  3.7  3.1*   BUN  14  15  11   CREATININE  1.00  1.14  1.00   ALBUMIN   --   3.6   --    GLUCOSE  111*  118*  131*     Additional Labs: Not Applicable    Prior Living Situation:   Housing / Facility: 2 Story Apartment / Condo  Steps In " Home: 20  Lives with - Patient's Self Care Capacity: Child Less than 18 Years of Age  Equipment Owned: Other (Comments) (walking stick)    Prior Level of Function / Living Situation:   Physical Therapy: Prior Services: None  Housing / Facility: 2 Story Apartment / Condo  Steps In Home: 20  Rail: Both Rail (Steps into Home)  Elevator: No  Bathroom Set up: Bathtub / Shower Combination  Equipment Owned: Other (Comments) (walking stick)  Lives with - Patient's Self Care Capacity: Child Less than 18 Years of Age  Bed Mobility: Independent  Transfer Status: Independent  Ambulation: Independent  Assistive Devices Used: None  Stairs: Independent (Pt report ascending/descending stairs can be difficult)  Current Level of Function:   Level Of Assist: Minimal Assist  Assistive Device: Front Wheel Walker  Distance (Feet): 75  Deviation: Bradykinetic  Skilled Intervention: Verbal Cuing  Comments: Pt has declined w/her amb efforts from previous PT session. Pt orthostatic BP limiting distances and pt became more aphasic. Nrsg notified.   Supine to Sit: Supervised  Sit to Supine: Supervised  Scooting: Supervised  Rolling: Supervised  Comments: HOB flat and no railing  Sit to Stand: Supervised (from EOB->FWW)  Bed, Chair, Wheelchair Transfer: Minimal Assist (w/FWW)  Toilet Transfers: Refused  Transfer Method: Stand Pivot  Skilled Intervention: Verbal Cuing  Sitting in Chair: functional  Sitting Edge of Bed: NT  Standin  Occupational Therapy:   Self Feeding: Independent  Grooming / Hygiene: Independent  Bathing: Independent  Dressing: Independent  Toileting: Independent  Medication Management: Independent  Laundry: Independent  Kitchen Mobility: Independent  Finances: Independent  Home Management: Independent  Shopping: Independent  Prior Level Of Mobility: Independent Without Device in Community  Driving / Transportation: Driving Independent  Prior Services: None  Housing / Facility: 2 Story Apartment / Condo  Occupation  (Pre-Hospital Vocational): Retired Due To Age  Current Level of Function:   Eating: Supervision  Bathing: Not Tested  Upper Body Dressing: Supervision  Lower Body Dressing: Supervision  Toileting: Not Tested (declined need to use BR)  Skilled Intervention: Verbal Cuing  Speech Language Pathology:   Assessment : Entered patient's room to work on cognitive-linguistic goals. However, her expressive aphasia was quite inhibiting tx. Therefore, an aphasia evaluation was completed, using the bedside Western Aphasia Battery (WAB) as well as non standardized testing. Currently, she presents with naming difficulty (7/20 pictures/objects named correctly) and her speech is filled with incomplete sentences and fillers (such as there, here, that). While describing the cookie theft picture she only had 1 complete sentence out of 11. She has both phonemic paraphasias and well as jargon in her speech. Once she was made aware of her speech error she was able to correct herself (though it took a few tries). She was able to spell simple objects and then state the name correctly. Patient needs 24 hour supervision to address safety and adequate communication.  Problem List: Aphasia, Impaired Safety, Impaired Judgement  Rehabilitation Prognosis/Potential: Good  Estimated Length of Stay: 14-21 days    Nursing:   Orientation : Disoriented to Time  Continent and Avalos in Place    Scope/Intensity of Services Recommended:  Physical Therapy: 1 hr / day  5 days / week. Therapeutic Interventions Required: Maximize Endurance, Mobility, Strength and Safety  Occupational Therapy: 1 hr / day 5 days / week. Therapeutic Interventions Required: Maximize Self Care, ADLs, IADLs and Energy Conservation  Speech & Language Pathology: 1 hr / day 5 days / week. Therapeutic Interventions Required: Maximize Cognition and Safety  Rehabilitation Nursin/7. Therapeutic Interventions Required: Monitor Pain, Skin, Vital Signs, Intake and Output, Labs, Safety and  Family Training  Rehabilitation Physician: 3 - 5 days / week. Therapeutic Interventions Required: Medical Management    Rehabilitation Goals and Plan (Expected frequency & duration of treatment in the IRF):   Return to the Community, Modified Independent Level of Care and Family Able to Provide 24/7 Assistance  Anticipated Date of Rehabilitation Admission: 06-19-19  Patient/Family oriented IRF level of care/facility/plan: Yes  Patient/Family willing to participate in IRF care/facility/plan: Yes  Patient able to tolerate IRF level of care proposed: Yes  Patient has potential to benefit IRF level of care proposed: Yes  Comments: Not Applicable    Special Needs or Precautions - Medical Necessity:  Safety Concerns/Precautions:  Fall Risk / High Risk for Falls, Balance, Cognition and Bed / Chair Alarm  Pain Management  IV Site: Peripheral  Current Medications:    Current Facility-Administered Medications Ordered in Epic   Medication Dose Route Frequency Provider Last Rate Last Dose   • apixaban (ELIQUIS) tablet 5 mg  5 mg Oral BID Lyudmila Fuentes A.P.N.   5 mg at 06/18/19 0539   • ondansetron (ZOFRAN ODT) dispertab 4 mg  4 mg Oral Q4HRS PRN Jose Daniel Gayle M.D.       • prochlorperazine (COMPAZINE) tablet 5 mg  5 mg Oral Q6HRS PRN Jose Daniel Gayle M.D.   5 mg at 06/18/19 0556   • acetaminophen/caffeine/butalbital 325-40-50 mg (FIORICET) -40 MG per tablet 1 Tab  1 Tab Oral Q6HRS PRN Lyudmila Fuentes A.P.N.       • clopidogrel (PLAVIX) tablet 75 mg  75 mg Oral DAILY Marcos Spain M.D.   75 mg at 06/18/19 0539   • ipratropium-albuterol (DUONEB) nebulizer solution  3 mL Nebulization Q4HRS PRN Marcos Spain M.D.       • levETIRAcetam (KEPPRA) tablet 500 mg  500 mg Oral BID Lyudmila Fuentes, A.P.N.   500 mg at 06/18/19 0539   • omeprazole (PRILOSEC) capsule 20 mg  20 mg Oral DAILY Marcos Spain M.D.   20 mg at 06/18/19 0538   • ALPRAZolam (XANAX) tablet 0.25 mg  0.25 mg Oral QDAY PRN Mary  Alex, A.P.R.N.   0.25 mg at 06/17/19 2057   • acetaminophen (TYLENOL) tablet 650 mg  650 mg Oral Q6HRS PRN Mary Johnsonin, A.P.R.N.       • Respiratory Care per Protocol   Nebulization Continuous RT Mary Johnsonin, A.P.R.N.       • senna-docusate (PERICOLACE or SENOKOT S) 8.6-50 MG per tablet 1 Tab  1 Tab Oral Nightly Mary Johnson, A.P.R.N.   1 Tab at 06/17/19 2057   • senna-docusate (PERICOLACE or SENOKOT S) 8.6-50 MG per tablet 1 Tab  1 Tab Oral Q24HRS PRN Mary Johnson, A.P.R.N.       • polyethylene glycol/lytes (MIRALAX) PACKET 1 Packet  1 Packet Oral BID Mary Johnson, A.P.R.N.   1 Packet at 06/18/19 0538   • magnesium hydroxide (MILK OF MAGNESIA) suspension 30 mL  30 mL Oral DAILY Mary Johnson, A.P.R.N.   30 mL at 06/18/19 0538   • bisacodyl (DULCOLAX) suppository 10 mg  10 mg Rectal Q24HRS PRN Mary Johnson, A.P.R.N.       • fleet enema 133 mL  1 Each Rectal Once PRN Mary Johnson, A.P.R.N.       • acetaminophen (TYLENOL) suppository 650 mg  650 mg Rectal Q4HRS PRN Mary Johnson, A.P.R.N.       • Pharmacy Consult Request ...Pain Management Review 1 Each  1 Each Other PHARMACY TO DOSE Honey Ferguson M.D.       • MD ALERT...DO NOT ADMINISTER NSAIDS or ASPIRIN unless ORDERED By Neurosurgery 1 Each  1 Each Other PRN Honey Ferguson M.D.       • dexamethasone (DECADRON) injection 4 mg  4 mg Intravenous Once PRN Honey Ferguson M.D.       • labetalol (NORMODYNE,TRANDATE) injection 10 mg  10 mg Intravenous Q HOUR PRN Honey Ferguson M.D.       • hydrALAZINE (APRESOLINE) injection 10 mg  10 mg Intravenous Q HOUR PRN Honey Ferguson M.D.         No current [facility]-ordered outpatient prescriptions on file.     Diet:   DIET ORDERS (Through next 24h)    Start     Ordered    06/14/19 0803  Diet Order Regular  START AT BREAKFAST     Question:  Diet:  Answer:  Regular    06/14/19 0803          Anticipated Discharge Destination / Patient/Family Goal:  Destination: Home with Assistance Support System: Family   Anticipated home health  services: OT, PT and SLP  Previously used HH service/ provider: Not Applicable  Anticipated DME Needs: [employer]  Outpatient Services: OT and PT  Alternative resources to address additional identified needs:     Pre-Screen Completed: 6/18/2019 2:28 PM Mak Alonso L.P.N.

## 2019-06-18 NOTE — THERAPY
"Speech Language Therapy Evaluation completed to address speech  Functional Status:  Entered patient's room to work on cognitive-linguistic goals. However, her expressive aphasia was quite inhibiting tx. Therefore, an aphasia evaluation was completed, using the bedside Western Aphasia Battery (WAB) as well as non standardized testing. Currently, she presents with naming difficulty  (7/20 pictures/objects named correctly) and her speech is filled with incomplete sentences and fillers (such as there, here, that). While describing the cookie theft picture she only had 1 complete sentence out of 11. She has both phonemic paraphasias and well as neologisms in her speech. Once she was made aware of her speech error she was able to correct herself (though it took a few tries). She was able to spell simple objects and then state the name correctly.   She continues to be impulsive and lack insight but given understanding that she is aphasic she was actually oriented x3 and was able to give appropriate solutions to ADL situations given cues to correct her speech.  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 Currently, she is not safe to be home alone nor care for a child independently. Her daughter verbalized understanding. The patient is still hopeful she'll recover enough to care for her grandchild    Recommendations:  Patient needs 24 hour supervision to address safety and adequate communication  Plan of Care: Will benefit from Speech Therapy 5 times per week  Post-Acute Therapy: Recommend inpatient transitional care services for continued speech therapy services.        See \"Rehab Therapy-Acute\" Patient Summary Report for complete documentation.   "

## 2019-06-18 NOTE — PROGRESS NOTES
Trauma / Surgical Daily Progress Note    Date of Service  6/18/2019    Chief Complaint  78 y.o. female admitted 6/13/2019 with Trauma-Syncopal     Interval Events  Ectopy and subjective dizziness on standing area's of concern overnight  -did get 500 cc bolus with no relief  -sitting sbp 111 at last check  -Cardiology consulted for medication assistance     Lasix stopped on 6/17  -GFR improved this am    HR reflective of ectopy  -remains on cardiac monitor    Rehab felt patient would be a good candidate    Hypokalemic this am  -replace and recheck     Review of Systems  Review of Systems   Constitutional: Negative for chills and fever.   Respiratory: Negative for shortness of breath.    Cardiovascular: Negative for chest pain.   Gastrointestinal: Negative for abdominal pain, nausea and vomiting.        Last bowel movement 6/17   Genitourinary:        Avalos   Musculoskeletal: Positive for myalgias.   Neurological: Positive for dizziness (when standing). Negative for sensory change, focal weakness and headaches.        Vital Signs  Temp:  [36.4 °C (97.5 °F)-38.3 °C (100.9 °F)] 37.4 °C (99.3 °F)  Pulse:  [44-81] 51  Resp:  [16-18] 16  BP: ()/(40-77) 99/73  SpO2:  [91 %-93 %] 93 %    Physical Exam  Physical Exam   Constitutional: She is oriented to person, place, and time. She appears well-developed and well-nourished. She is cooperative. No distress.   HENT:   Head: Normocephalic.   Left facial/orbital ecchymosis/edema    Eyes: Conjunctivae are normal.   Neck: No JVD present. No tracheal deviation present.   Cardiovascular: Normal rate and intact distal pulses.  Frequent extrasystoles are present.   Pulmonary/Chest: Effort normal and breath sounds normal. No respiratory distress. She exhibits no tenderness.   Abdominal: Soft. She exhibits no distension.   Genitourinary:   Genitourinary Comments: Avalos to gravity with clear yellow urine    Musculoskeletal: She exhibits no edema.   Moves all extremities  Generalized  aches   Neurological: She is alert and oriented to person, place, and time. GCS eye subscore is 4. GCS verbal subscore is 5. GCS motor subscore is 6.   Skin: Skin is warm and dry.   Areas of ecchymosis to BUEs    Psychiatric: She has a normal mood and affect.   Nursing note and vitals reviewed.      Laboratory  Recent Results (from the past 24 hour(s))   CBC WITH DIFFERENTIAL    Collection Time: 06/18/19  2:35 AM   Result Value Ref Range    WBC 12.8 (H) 4.8 - 10.8 K/uL    RBC 4.73 4.20 - 5.40 M/uL    Hemoglobin 14.5 12.0 - 16.0 g/dL    Hematocrit 43.6 37.0 - 47.0 %    MCV 92.2 81.4 - 97.8 fL    MCH 30.7 27.0 - 33.0 pg    MCHC 33.3 (L) 33.6 - 35.0 g/dL    RDW 50.5 (H) 35.9 - 50.0 fL    Platelet Count 225 164 - 446 K/uL    MPV 11.6 9.0 - 12.9 fL    Neutrophils-Polys 68.70 44.00 - 72.00 %    Lymphocytes 17.50 (L) 22.00 - 41.00 %    Monocytes 11.40 0.00 - 13.40 %    Eosinophils 1.20 0.00 - 6.90 %    Basophils 0.70 0.00 - 1.80 %    Immature Granulocytes 0.50 0.00 - 0.90 %    Nucleated RBC 0.00 /100 WBC    Neutrophils (Absolute) 8.78 (H) 2.00 - 7.15 K/uL    Lymphs (Absolute) 2.23 1.00 - 4.80 K/uL    Monos (Absolute) 1.45 (H) 0.00 - 0.85 K/uL    Eos (Absolute) 0.15 0.00 - 0.51 K/uL    Baso (Absolute) 0.09 0.00 - 0.12 K/uL    Immature Granulocytes (abs) 0.06 0.00 - 0.11 K/uL    NRBC (Absolute) 0.00 K/uL   Basic Metabolic Panel    Collection Time: 06/18/19  2:35 AM   Result Value Ref Range    Sodium 133 (L) 135 - 145 mmol/L    Potassium 3.1 (L) 3.6 - 5.5 mmol/L    Chloride 96 96 - 112 mmol/L    Co2 26 20 - 33 mmol/L    Glucose 131 (H) 65 - 99 mg/dL    Bun 11 8 - 22 mg/dL    Creatinine 1.00 0.50 - 1.40 mg/dL    Calcium 9.6 8.5 - 10.5 mg/dL    Anion Gap 11.0 0.0 - 11.9   ESTIMATED GFR    Collection Time: 06/18/19  2:35 AM   Result Value Ref Range    GFR If African American >60 >60 mL/min/1.73 m 2    GFR If Non  53 (A) >60 mL/min/1.73 m 2   EKG    Collection Time: 06/18/19  3:20 AM   Result Value Ref Range     Report       [facility]    Test Date:  2019  Pt Name:    PATI CUELLAR             Department: [facility]  MRN:        [id number]                      Room:       S139  Gender:     Female                       Technician: LINNEA  :        [id number]                   Requested By:CALLUM FORBES  Order #:    [id number]                    Reading MD:    Measurements  Intervals                                Axis  Rate:       61                           P:          48  NM:         191                          QRS:        -63  QRSD:       147                          T:          123  QT:         434  QTc:        438    Interpretive Statements  SINUS RHYTHM  VENTRICULAR TRIGEMINY  PROBABLE LEFT ATRIAL ENLARGEMENT  LEFT BUNDLE BRANCH BLOCK  Compared to ECG 2019 11:31:03  Left bundle-branch block now present  Intraventricular conduction delay no longer present  Left ventricular hypertrophy no longer present  Early repolarization no longer present         Fluids    Intake/Output Summary (Last 24 hours) at 19 1314  Last data filed at 19 2100   Gross per 24 hour   Intake              510 ml   Output              800 ml   Net             -290 ml       Core Measures & Quality Metrics  Labs reviewed, Medications reviewed and Radiology images reviewed  Avalos catheter: Urinary Tract Retention or Urinary Tract Obstruction      DVT: eliquis.  DVT prophylaxis - mechanical: SCDs  Ulcer prophylaxis: Yes    Assessed for rehab: Patient was assess for and/or received rehabilitation services during this hospitalization    Total Score: 9    ETOH Screening     Assessment complete date: 2019        Assessment/Plan  Anticoagulated- (present on admission)   Assessment & Plan    Plavix for recent MI  Eliquis for PAF  Received KCentra 1958 units and Vitamin K 10 mg at referring facility.  Additional dose of KCentra 1622 units given in ICU.  TEG with platelet mapping with ADP elevation.  Platelet transfusion  held.  6/14 Plavix resumed. Lovenox 40 mg daily cleared by neurosurgery, initiated.  6/17 Cleared to restart Eliquis- stop Lovenox. EKG with frequent ectopy.      Traumatic subarachnoid hemorrhage with loss of consciousness of 30 minutes or less (HCC)- (present on admission)   Assessment & Plan    Acute subarachnoid hemorrhage is moderate volume with no intra-axial hematoma or hydrocephalus. No significant mass effect.  Repeat CT scan stable. CTA without aneurysm identified.  Repeat CT scan stable.  If the CT scanning on Monday which is been ordered is stable she can be fully anticoagulated.  Non-operative management.  Post traumatic pharmacologic seizure prophylaxis for 1 week.  Speech Language Pathology cognitive evaluation.  Honey Ferguson MD. Neurosurgery.      Discharge planning issues- (present on admission)   Assessment & Plan    Date of admission: 6/13/2019  Date:6/14 Transfer orders from SICU  Date: 6/16 Rehab/SNF consult   Cleared for discharge: No  Discharge delayed: No    Discharge date:      Urinary retention   Assessment & Plan    6/13 Badillo insertion for urinary retention.  6/16 Trial badillo removal.  6/17 Replaced for retrention     UTI (urinary tract infection)- (present on admission)   Assessment & Plan    Discharged from [facility] on Cefdinir 300 mg BID for 3 days (completed 6/11).  6/17 WBC rising- Urine with small leuks- afebrile  6/18 WBC decreasing  Monitor      Syncope- (present on admission)   Assessment & Plan    Syncope at home and GLF.  Does report history of syncope since childhood, attributed to vasovagal response. Seizures ruled out.  EKG from referring facility with atrial fibrillation; ventricular tachycardia, unsustained; left bundle branch block.  Repeat EKG with sinus rhythm, ventricular bigeminy, nonspecific IVCD with LAD and LVH with secondary repolarization abnormality.  6/14 Carotid duplex with bilateral plaque of the bifurcation extending into the internal carotid.  Velocities are consistent with < 50% stenosis of the internal carotid artery. Incidental finding: Right vertebral artery lies outside of vertebrae until the mid-distal segment. Antegrade flow is observed.  Left side dampened, slightly resistive flow is observed in the vertebral artery consistent with a possible more distal obstruction  6/14 Echocardiogram: normal chamber sizes. Normal RV size and function. LV function is mild to moderate depressed. LVEF is 40% visually, worse on ectopic beats. There is apical and lateral wall hypokinesis. RVSP estimated to 30-35 mmHg. Mild mitral annular calcification.       History of MI (myocardial infarction)- (present on admission)   Assessment & Plan    Recently hospitalized at [facility] for MI.  Stent placement.  Discharge on Plavix and Eliquis.  Referring facility troponin 0.05.  Repeat troponin trend down.      Hypokalemia- (present on admission)   Assessment & Plan    Admission serum potassium 2.5.  Empiric magnesium and phosphorus replacement.  Replete and trend.      Essential hypertension- (present on admission)   Assessment & Plan    Chronic condition treated with Maxide.  Resumed maintenance medication.     History of CHF (congestive heart failure)- (present on admission)   Assessment & Plan    Chronic condition treated with lasix. EF 40%  Resumed maintenance medication.     Depression- (present on admission)   Assessment & Plan    Chronic condition treated with seroquel and Cymbalta.  Not resumed during acute phase due to prolonged QT.  Monitor for Cymbalta withdrawal symptoms.     GERD (gastroesophageal reflux disease)- (present on admission)   Assessment & Plan    Chronic condition treated with Nexium.  Resumed maintenance medication.     Encounter for geriatric assessment- (present on admission)   Assessment & Plan    6/13 Consult placed.  Sebastián Pacheco MD, Geriatrics.     Trauma- (present on admission)   Assessment & Plan    Syncope and GLF while on  blood thinners. Seen at [facility].  Trauma Red Transfer Activation.  Tyler Barrios MD. Trauma Surgery.         Discussed patient condition with RN, Patient and trauma surgery.

## 2019-06-18 NOTE — PROGRESS NOTES
Monitor summary: SR 64-74, MA .18, QRS .10, QT .36 with F trigm, F PVC's, O BBB's, R coup, R trip,  And  R PVC's per strip from monitor room.

## 2019-06-18 NOTE — DISCHARGE PLANNING
Dr. Delarosa has reviewed this case this morning.  Dr. Delarosa is requesting an updated cog eval. Thea will be a Rehab candidate if she still has cog needs.  Msg placed to [employer].

## 2019-06-18 NOTE — PROGRESS NOTES
"Geriatric Progress Note:  6/18/2019  CC: fall/sycnope    S: patient + orthostatic VS yesterday. Stopped diuretics, persisted this AM. No BM this AM. Continues with nausea, meds work. Not having great intake. Endorses intermittent confusion, woke up last night at 2 and was OOB without symptoms. Patient continues to feel funny.   Gave patient bolus of 500 cc, she felt improved but still was off balance when I sat her up in bed. No change in resp status.     ROS: a 14 pt ROS was negative other than as stated above.     O:/63   Pulse (!) 44   Temp (!) 38.3 °C (100.9 °F) (Temporal)   Resp 18   Ht 1.791 m (5' 10.5\")   Wt 81.6 kg (179 lb 14.3 oz)   SpO2 91%   Breastfeeding? No   BMI 25.45 kg/m²   Gen: NAD, Alert and oriented x4  Delirium screen: some flucation of cousre, but other three features negative, able to say day so week backwards  Neuro: right lower facial droop, mild dysarthria  CV: RRR, with ectopy, 2+ left radial pulse  Lungs: normal effort CAB  Ext: arthric changes noted to hands bilaterally, well groomed left foot.   Psych: does not appear to be responding to internal stimuli.     Labs/Imaging/EKG: sodium slightly decrease to 133 from 134, K of 3.1, improve creatinine. EKG with Qtc of     Assessment: 79 y/o F with recent PE and MI s/p 2 stents here after having syncope and resultant TBI    Plan:   Syncope  -unclear etiology, concern for cardiac, now with occ bradycardia on telemetry. ECHO with EF of 40% which is worse with ectopy.   -based on cardiology     Orthostatic hypotension  -unclear if this was inciting factor for syncope, query other cause as above  -given 500 cc bolus X2 with subjective improvement  -watch resp status  -if still symptomatic tomorrow, recheck Orothstatic VS, consider TSH, t4, cortisol.     Nausea  -TBI vs dehydration  -query if compazine is also contributingcausing orthostatic hypotension, now on oral pills only and tolerating.     Sub-syndromal delirium  -has some " features but not all of delrium  -query meds as main reversible factor - both missing cymbalat and seroquel as well as use of compazine.   -has badillo, but UA negative ysterday, no sings of PNA        Prolong QTc  Depression  Anxiety  -Qtc>500 ms PTA  -improved to 438 ms  -restarted on elquis for PE today  -pending improvement will restart cymbalta 30 mg tomorrow.     Traumatic SAH  -prophylactic keppra x7 days per neurosurgery  -resume anticoag per neurosurgery, now back on clopidogrel and apixibn  -encourage IS  -encourage mobility    Hyponatremia  -diuretics, hypovolemia, TBI, meds all potential causes  -mostly stable, removing offending meds  -watch sodium    R facial droop  Dysarthria  -not new per patient, possibly related to SAH   -outpatient MRI brain to be considered, would not currently  as ischemic would be the main cause.     Recent PE  Recent MI  -does not tolerate statins  -HR likely not to tolerated BB or HTN med, with intermittent bradycardia and orthostatsis    Hypokalemia  -to be replaced by primary team.   Thank you for including us in the care of this patient. We will follow along tomorrow. Recheck BMP and Mag tomorrow.     Sebastián Pacheco M.D.  Geriatric Physician   Can be reached at extension: [phone]  Monday - Friday 8-5      This note was partially complete completed using voice recognition software.  I did my best to correct errors prior to submitting this note, but for any concerns please do not hesitate to contact me.

## 2019-06-18 NOTE — PROGRESS NOTES
RN MOBILITY NOTE     Surgery patient?: no  Date of surgery: n/a  Ambulated 50 ft on day of surgery? (N/A if today is not date of surgery): n/a  Number of times ambulated 50 feet or greater today: 0, 10ft  Patient has been up to chair, edge of bed or HOB 90 degrees for all meals?: yes  Goal met? (goal is ambulating at least 50 feet 2 times on day shift, one time on night shift): no  If patient did not meet mobility goal, why?: pt refused ambulation, education provided, up to chair and back to bed tonight

## 2019-06-18 NOTE — CARE PLAN
Problem: Safety  Goal: Will remain free from falls  Outcome: PROGRESSING AS EXPECTED  Call light instruction reinforced

## 2019-06-18 NOTE — CARE PLAN
Problem: Safety  Goal: Will remain free from falls  Outcome: PROGRESSING AS EXPECTED  Bed locked in lowest position, bed alarm on. Call light and personal belongings within reach. Hourly rounding.    Problem: Fluid Volume:  Goal: Will maintain balanced intake and output  Outcome: PROGRESSING AS EXPECTED  NS bi\olus administered per MAR; fluid intake encouraged; Avalos in place; I&O monitoring.

## 2019-06-18 NOTE — PROGRESS NOTES
Monitor summary: SR 68-89, MO .18, QRS .14, QT .42 with R PVC's, trigem PVC, triplet PVC, and couplet PVC per strip from monitor room.

## 2019-06-18 NOTE — CONSULTS
Cardiology Initial Consultation    Date of Service  6/18/2019    Referring Physician  Tyler Barrios M.D.    Reason for Consultation  Concern for arrhythmia    History of Presenting Illness  Thea Kessler is a 78 y.o. female with a past medical history of recent non-STEMI/acute coronary syndrome at [facility] ----    -Left bundle branch block, noted on previous hospitalizations  -COPD, more than 100 pack years.  Says she recently quit smoking  -Non-STEMI, May 2019 drug-eluting stent to RCA and obtuse marginal ejection fraction 40%  -Pulmonary embolus, chronic.  Long-standing anticoagulation  -Hypertension with recent increase in medication  -UTI, repeat hospitalization early June 2019  -Anxiety/depression on antipsychotics  -Pneumonia April 2019 hospitalization    She is a poor historian.  She appears confused and knows she has been in the hospital but is not sure where she is now.  She says she has been taking her medications but cannot name them.  She knows she is on several blood thinners.    Per chart she states she is very lightheaded and dizzy before she collapsed.  There is no evidence of heart block in the emergency room or by report from the ambulance team.  The neurosurgeon recommended reversal of her anticoagulation antiplatelet therapy which she received.    There is been note made of persistent arrhythmia and concerns of the nursing team about her heart.  The patient herself does not endorse any chest discomfort breathlessness or cardiac symptoms    Review of Systems  Review of Systems   Unable to perform ROS: Acuity of condition       Past Medical History   has a past medical history of Arthritis; Depression; GERD (gastroesophageal reflux disease); Hypokalemia; and Swelling.    Surgical History   has a past surgical history that includes appendectomy; hemorrhoidectomy; and cholecystectomy.    Family History  She denies family history of premature coronary disease that she knows of      Social History   reports that she has been smoking.  She does not have any smokeless tobacco history on file. She reports that she drinks alcohol. She reports that she does not use drugs.    Medications  Prior to Admission Medications   Prescriptions Last Dose Informant Patient Reported? Taking?   ALPRAZolam (XANAX) 1 MG Tab 6/12/2019 at PRN Family Member Yes No   Sig: Take 1 mg by mouth 2 times a day as needed for Sleep or Anxiety.   DULoxetine (CYMBALTA) 60 MG Cap DR Particles delayed-release capsule 6/12/2019 at AM Family Member Yes Yes   Sig: Take 60 mg by mouth every day.   Guaifenesin 400 MG Tab PRN at PRN Family Member Yes Yes   Sig: Take 400 mg by mouth every four hours as needed.   QUEtiapine (SEROQUEL) 25 MG Tab 6/12/2019 at PM Family Member Yes Yes   Sig: Take 25 mg by mouth every bedtime.   apixaban (ELIQUIS) 5mg Tab 6/12/2019 at PM Family Member Yes Yes   Sig: Take 5 mg by mouth 2 Times a Day.   clopidogrel (PLAVIX) 75 MG Tab 6/12/2019 at AM Family Member Yes Yes   Sig: Take 75 mg by mouth every day.   docusate sodium (COLACE) 100 MG Cap 6/12/2019 at AM Family Member Yes Yes   Sig: Take 100 mg by mouth every day.   esomeprazole (NEXIUM) 40 MG delayed-release capsule 6/12/2019 at AM Family Member Yes Yes   Sig: Take 40 mg by mouth every morning before breakfast.   furosemide (LASIX) 20 MG Tab 6/12/2019 at AM Family Member Yes Yes   Sig: Take 20 mg by mouth every day.   ipratropium-albuterol (DUONEB) 0.5-2.5 (3) MG/3ML nebulizer solution 6/12/2019 at PRN Family Member Yes Yes   Sig: 3 mL by Nebulization route every four hours as needed for Shortness of Breath.   nicotine (NICODERM) 14 MG/24HR PATCH 24 HR 6/12/2019 at ON Family Member Yes Yes   Sig: Apply 1 Patch to skin as directed every 24 hours.   potassium chloride SA (K-DUR) 10 MEQ Tab CR 6/12/2019 at PM Family Member Yes Yes   Sig: Take 10 mEq by mouth 2 times a day.   triamterene-hydrochlorothiazide (MAXZIDE 75-50) 75-50 MG Tab 6/12/2019 at PM  Family Member Yes Yes   Sig: Take 0.5 Tabs by mouth 2 Times a Day.   vitamin D (CHOLECALCIFEROL) 1000 UNIT Tab 6/12/2019 at AM Family Member Yes Yes   Sig: Take 1,000 Units by mouth every day.      Facility-Administered Medications: None       Allergies  Allergies   Allergen Reactions   • Lipitor [Atorvastatin Calcium]      And related meds   • Pcn [Penicillins] Hives and Swelling   • Sulfa Drugs Hives and Swelling       Vital signs in last 24 hours  Temp:  [36.4 °C (97.5 °F)-38.3 °C (100.9 °F)] 37.4 °C (99.3 °F)  Pulse:  [44-81] 51  Resp:  [16-18] 16  BP: ()/(40-77) 99/73  SpO2:  [91 %-93 %] 93 %    Physical Exam  Physical Exam   Constitutional: No distress.   Ecchymosis of the left eye, answers questions but vague about time and place, and about where she is   HENT:   Mouth/Throat: Oropharynx is clear and moist. No oropharyngeal exudate.   Eyes: Pupils are equal, round, and reactive to light. EOM are normal. No scleral icterus.   Neck: No JVD present.   Cardiovascular: Normal rate, regular rhythm and intact distal pulses.    No murmur heard.  Frequent ventricular ectopy   Pulmonary/Chest: Effort normal and breath sounds normal. She exhibits no tenderness.   Abdominal: Soft. Bowel sounds are normal.   Musculoskeletal: She exhibits no edema or tenderness.   Lymphadenopathy:     She has no cervical adenopathy.   Neurological: No cranial nerve deficit. She exhibits normal muscle tone. Coordination normal.   Skin: Skin is warm and dry. She is not diaphoretic.   Psychiatric:   As above       Lab Review  Lab Results   Component Value Date/Time    WBC 12.8 (H) 06/18/2019 02:35 AM    RBC 4.73 06/18/2019 02:35 AM    HEMOGLOBIN 14.5 06/18/2019 02:35 AM    HEMATOCRIT 43.6 06/18/2019 02:35 AM    MCV 92.2 06/18/2019 02:35 AM    MCH 30.7 06/18/2019 02:35 AM    MCHC 33.3 (L) 06/18/2019 02:35 AM    MPV 11.6 06/18/2019 02:35 AM      Lab Results   Component Value Date/Time    SODIUM 133 (L) 06/18/2019 02:35 AM    POTASSIUM 3.1  (L) 06/18/2019 02:35 AM    CHLORIDE 96 06/18/2019 02:35 AM    CO2 26 06/18/2019 02:35 AM    GLUCOSE 131 (H) 06/18/2019 02:35 AM    BUN 11 06/18/2019 02:35 AM    CREATININE 1.00 06/18/2019 02:35 AM      Lab Results   Component Value Date/Time    ASTSGOT 42 06/17/2019 02:42 AM    ALTSGPT 20 06/17/2019 02:42 AM     Lab Results   Component Value Date/Time    TROPONINI 0.04 06/13/2019 02:40 PM             Cardiac Imaging and Procedures Review  EKG:  My personal interpretation of the EKG dated today shows LVH, left axis deviation sinus rhythm with frequent  Monomorphic PVCs and a right bundle branch pattern      echocardiogram: From the 14th shows a structurally normal heart, frequent ectopy makes the ejection fraction appeared depressed but in normal R to R interval's it appears More normal    Cardiac Catheterization: As above    Imaging  Chest X-Ray: On admission shows stable COPD no infiltrate      Assessment/Plan    Arrhythmia  Frequent monomorphic PVCs  No evidence for ischemic origin, QT appears normal, she is on Seroquel which may prolong this  Initiate beta-blocker.  This should help with the PVCs which would in turn improve her ejection fraction  Therefore, a beta-blocker should improve her ejection fraction rather than drop it    Recent ACS with stents  Obviously we are stuck between a rock and a hard place.  She is at very high risk of reoccluding her stents  Neurosurgery will direct us, but obviously we would love to keep her on her her dual antiplatelet therapy if and whenever possible for the rest of the year  I will order nuclear perfusion imaging study to breast stratify her to see if any of this ectopy is related to ischemia which is unlikely    Pulmonary embolus  As above, defer to neurosurgery  Very very close eye while she is on anticoagulation and triple therapy    Ground-level fall  I do not have any high risk features that would suggest that a cardiac cause such as heart block or ectopy because  this  Watchful waiting, careful not to overmedicate which could have been a cause of this with a recent increase in her antihypertensives    Thank you for allowing me to participate in the care of this patient.  We will follow her along as warranted during her hospital stay.  Discussed with the trauma surgery care team.    Please contact me with any questions.    Felicitas Anthony M.D.   Cardiologist, [facility]  [phone]

## 2019-06-18 NOTE — DISCHARGE PLANNING
Anticipated Discharge Disposition:   [facility]    Action:    Spoke to patient.  She is agreeable to IR.  Discussed SNF.  Pt agreeable to SNF if needed.  Choices obtained and faxed to [facility].    Barriers to Discharge:    Medical clearance    Plan:    F/U with Trauma APRN.

## 2019-06-18 NOTE — DISCHARGE PLANNING
Dr. 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  Cards to see per LALITHA Fuentes.  TCC to follow up in the am.

## 2019-06-18 NOTE — PROGRESS NOTES
Neurosurgery Progress Note    Subjective:  Awake and conversant  Some nausea, generally in the mornings  Rehab in the works      Exam:  Awake, alert   Speech fluent appropriate  In no apparent distress  Affect, mood appropriate  Oriented x 3  Pupils 3 mm midline, reactive.  Conjugate gaze.  Visual fields full to confrontation  Face symmetric  Tongue midline without fasciculation  Facial sensation intact light touch  Hearing intact to conversation, light finger rub bilaterally  Motor:  Bilateral SCM, shoulder shrug, deltoid, bicep, tricep, , wrist extension, hand intrinsics                IP, thigh adduction, thigh abduction, quadriceps, hamstrings, dorsiflexion,                Plantar flexion, foot inversion, foot eversion, EHL 5/5 no pronator drift  Sensation:  Intact touch face, neck, torso, four extremities  Reflex:  No Hahn's no clonus  Finger-nose-finger, STEPHEN unremarkable  Bruised everywhere.     BP  Min: 87/56  Max: 140/73  Pulse  Av.8  Min: 44  Max: 88  Resp  Av  Min: 16  Max: 18  Temp  Av °C (98.6 °F)  Min: 36.4 °C (97.5 °F)  Max: 38.3 °C (100.9 °F)  SpO2  Av.7 %  Min: 91 %  Max: 93 %    No Data Recorded    Recent Labs      19   0253  19   0242  19   0235   WBC  11.8*  14.4*  12.8*   RBC  4.48  4.63  4.73   HEMOGLOBIN  13.7  14.2  14.5   HEMATOCRIT  40.3  42.8  43.6   MCV  90.0  92.4  92.2   MCH  30.6  30.7  30.7   MCHC  34.0  33.2*  33.3*   RDW  48.4  50.6*  50.5*   PLATELETCT  248  234  225   MPV  10.9  11.5  11.6     Recent Labs      19   0253  19   0242  19   0235   SODIUM  136  134*  133*   POTASSIUM  3.3*  3.7  3.1*   CHLORIDE  96  98  96   CO2  31  28  26   GLUCOSE  111*  118*  131*   BUN  14  15  11   CREATININE  1.00  1.14  1.00   CALCIUM  9.7  9.4  9.6               Intake/Output       19 0700 - 19 0659 19 - 19 0659      1900-0659 Total  Total       Intake    P.O.  860  150 1010   --  -- --    P.O.  -- -- --    Total Intake  -- -- --       Output    Urine  1700  -- 1700  --  -- --    Output (mL) (Urethral Catheter 16 Fr.) 1700 -- 1700 -- -- --    Stool  --  -- --  --  -- --    Number of Times Stooled 2 x -- 2 x -- -- --    Total Output 1700 -- 1700 -- -- --       Net I/O     -840 150 -690 -- -- --            Intake/Output Summary (Last 24 hours) at 06/18/19 0751  Last data filed at 06/17/19 2100   Gross per 24 hour   Intake              770 ml   Output             1700 ml   Net             -930 ml            • apixaban  5 mg BID   • ondansetron  4 mg Q4HRS PRN   • prochlorperazine  5 mg Q6HRS PRN   • acetaminophen/caffeine/butalbital 325-40-50 mg  1 Tab Q6HRS PRN   • clopidogrel  75 mg DAILY   • ipratropium-albuterol  3 mL Q4HRS PRN   • levETIRAcetam  500 mg BID   • omeprazole  20 mg DAILY   • ALPRAZolam  0.25 mg QDAY PRN   • acetaminophen  650 mg Q6HRS PRN   • Respiratory Care per Protocol   Continuous RT   • senna-docusate  1 Tab Nightly   • senna-docusate  1 Tab Q24HRS PRN   • polyethylene glycol/lytes  1 Packet BID   • magnesium hydroxide  30 mL DAILY   • bisacodyl  10 mg Q24HRS PRN   • fleet  1 Each Once PRN   • acetaminophen  650 mg Q4HRS PRN   • Pharmacy Consult Request  1 Each PHARMACY TO DOSE   • MD ALERT...DO NOT ADMINISTER NSAIDS or ASPIRIN unless ORDERED By Neurosurgery  1 Each PRN   • dexamethasone  4 mg Once PRN   • labetalol  10 mg Q HOUR PRN   • hydrALAZINE  10 mg Q HOUR PRN     CT overnight improved      Assessment and Plan:  Hospital day #5  POD #0  Prophylactic anticoagulation:yes       Start date/time: Apixaban and Plavix started 6/17       Plan:  1. Q4hrly neuro checks  2. Cleared for transitional care from NSX perspective  3. We will organize repeat CT head and outpatient follow up in 2 weeks  4. Please call if NSX can be of further assistance

## 2019-06-18 NOTE — PROGRESS NOTES
Report given by day rn, POC discussed, pt received awake and alert sitting up in bed with daughter at bedside, no complaints of pain, no distress, bed alarm on locked and in lowest position, scd's refused and education provided, hourly rounding in place    0200- pt complains of increased anxiety and feelings of doom, no chest pain. Tele shows sustained trigeminy with multiple pvc's which she has had patterned for a few days, NP Misty notified and routine ekg ordered

## 2019-06-19 PROBLEM — E87.6 HYPOKALEMIA: Status: RESOLVED | Noted: 2019-01-01 | Resolved: 2019-01-01

## 2019-06-19 NOTE — PROGRESS NOTES
Trauma    (O)    Vitals:    06/19/19 1535   BP: 123/57   Pulse: 61   Resp: 16   Temp: 37.1 °C (98.8 °F)   SpO2: 93%       - EEG with no captured seizures     - Neuro: GCS 14. Aphasia. Following commands x 4 extremities. No overt drift in upper/lower extremities.    (A/P)  -  6/18 CT head stable  - MRI head ordered  - Discussed with Dr. Pacheco, geriatrics, Dr. Barrios trauma surgery, rapid response team and pt's family.

## 2019-06-19 NOTE — PROGRESS NOTES
Cardiology Follow Up Progress Note    Date of Service  6/19/2019    Attending Physician  Tyler Barrios M.D.    Chief Complaint   Syncope and Fall.     Consulted regarding concern for arrhythmia.     FELICITAS Kessler is a 78 y.o. female transferred from [facility] 6/13/2019 after CT of head was completed after a syncopal fall with patient striking her head showed subarachnoid hemorrhage.  Patient was on triple therapy with Eliquis for chronic PE and DAPT due to recent PCI with MARGA to the RCA and OM in May.  She was given Kcentra and vitamin K, repeat CT scans have remained stable.    Past medical history significant for coronary artery disease, hypokalemia and depression.      Interim Events  6/19/2019: Patient resting in bed with daughters at bedside.  She has new onset progressive expressive aphasia that began last night.     Monitor: Sinus rhythm with trigeminy and left bundle branch block.    Review of Systems  Review of Systems   Unable to perform ROS: Acuity of condition   Expressive Aphasia    Vital signs in last 24 hours  Temp:  [36.1 °C (97 °F)-37.1 °C (98.8 °F)] 36.1 °C (97 °F)  Pulse:  [54-74] 54  Resp:  [16-18] 18  BP: (104-127)/(59-75) 112/75  SpO2:  [91 %-93 %] 93 %    Physical Exam  Physical Exam   Constitutional: She appears well-developed and well-nourished.   Eyes: EOM are normal.   Neck: No JVD present.   Cardiovascular: Normal rate, normal heart sounds and intact distal pulses.  An irregular rhythm present.  Extrasystoles are present.   Pulmonary/Chest: Effort normal and breath sounds normal.   Musculoskeletal: Normal range of motion. She exhibits no edema.   Neurological: She is alert.   Unable to assess orientation due to aphasia   Skin: Skin is warm and dry.   Left facial/orbital ecchymosis/edema     Psychiatric: Her mood appears anxious. Her speech is delayed and slurred.   Nursing note and vitals reviewed.      Lab Review  Lab Results   Component Value Date/Time    WBC 13.8  (H) 06/19/2019 12:25 PM    RBC 4.42 06/19/2019 12:25 PM    HEMOGLOBIN 13.6 06/19/2019 12:25 PM    HEMATOCRIT 40.9 06/19/2019 12:25 PM    MCV 92.5 06/19/2019 12:25 PM    MCH 30.8 06/19/2019 12:25 PM    MCHC 33.3 (L) 06/19/2019 12:25 PM    MPV 11.6 06/19/2019 12:25 PM      Lab Results   Component Value Date/Time    SODIUM 135 06/19/2019 03:56 AM    POTASSIUM 3.8 06/19/2019 03:56 AM    CHLORIDE 102 06/19/2019 03:56 AM    CO2 24 06/19/2019 03:56 AM    GLUCOSE 107 (H) 06/19/2019 03:56 AM    BUN 12 06/19/2019 03:56 AM    CREATININE 0.91 06/19/2019 03:56 AM      Lab Results   Component Value Date/Time    ASTSGOT 42 06/17/2019 02:42 AM    ALTSGPT 20 06/17/2019 02:42 AM     Lab Results   Component Value Date/Time    TROPONINI 0.04 06/13/2019 02:40 PM             Cardiac Imaging and Procedures Review  Echocardiogram 6/15/19:  CONCLUSIONS  Technically difficult but adequate study for interpretation. Normal chamber sizes. Normal RV size and function. LV function is mild to moderate depressed. LVEF is 40% visually, worse on ectopic beats. There is apical and lateral wall hypokinesis. RVSP estimated to 30-35 mmHg.   Mild mitral annular calcification.      Assessment/Plan  Trigeminy:  -MPI to rule out ischemia as etiology postponed until tomorrow due to mental status changes this am, will re-evaluate in the morning.   -BP stable.   -Continue Lopressor 25 mg BID, attempting to reduce PVC's to increase cardiac output.  -If MPI is low risk, will evaluate other rhythm control options.    -Hold am Metoprolol for MPI.     CAD:  -S/P PCI with MARGA to RAC and OM in May of 2019.  -TTE on 6/15/19 showing apical and lateral wall hypokinesis, LVEF 40%.   -Plavix resumed on 6/18/19.   -No ASA due to chronic AC with Eliquis.     PE:  -Chronic.  -Cleared to re-start Eliquis.     Mental Status Change:  -Concern for seizure, infectious etiology or neurological/neurovascular event.   -Geriatrics and general surgery following patient.    Thank you  for allowing us to to participate in the care of this patient.  We will continue to follow alongside you in the care of this patient.  Please let us know if you have any questions or concerns.      LEA Verdugo.   [facility]  [phone]

## 2019-06-19 NOTE — THERAPY
Attempted to see pt for OT tx. RN asked to hold tx at this time. Pt getting further work up. Pt w/ new onset expressive aphasia and awaiting stress test. Will see pt as appropriate.

## 2019-06-19 NOTE — DISCHARGE PLANNING
Anticipated Discharge Disposition:   IRF    Action:   Met with daughter , informed her about [facility] has accepted but team is waiting for medical clearance. Daughter had concerns and have been communicated to the RN and CN.    Barriers to Discharge:   Medical clearance    Plan:   Follow up with Mak at IRF  Update family.

## 2019-06-19 NOTE — PROGRESS NOTES
Monitor Summary: SR 62-79, AR .20, QRS .14, QT .38 with frequent PVCs, couplet, triplet, trigem, bigem per strip from monitor room

## 2019-06-19 NOTE — THERAPY
Pt on hold today from PT, being worked up cardiac wise. Pt scheduled for cardiac stress test tomorrow.

## 2019-06-19 NOTE — PROGRESS NOTES
Trauma / Surgical Daily Progress Note    Date of Service  6/19/2019    Chief Complaint  78 y.o. female admitted 6/13/2019 with Trauma    Interval Events    GCS 14   NM Cardiac Stress Test   [facility]   Counseled     Review of Systems  Review of Systems   Unable to perform ROS: Mental acuity   Gastrointestinal:        6/17 (+) BM         Vital Signs  Temp:  [36.1 °C (97 °F)-37.4 °C (99.3 °F)] 36.1 °C (97 °F)  Pulse:  [51-81] 54  Resp:  [16-18] 18  BP: ()/(40-75) 112/75  SpO2:  [91 %-93 %] 93 %    Physical Exam  Physical Exam   Constitutional: She is cooperative. No distress.   HENT:   Left facial ecchymosis    Eyes: Pupils are equal, round, and reactive to light.   Cardiovascular: Normal rate and regular rhythm.    Pulmonary/Chest: No respiratory distress. She exhibits no tenderness.   Abdominal: She exhibits no distension. There is no tenderness. There is no rebound and no guarding.   Genitourinary:   Genitourinary Comments: Avalos to gravity,    Musculoskeletal:   Moves all extremities    Neurological: She is alert. GCS eye subscore is 4. GCS verbal subscore is 4. GCS motor subscore is 6.   Skin: Skin is warm and dry.   Ecchymosis bilateral upper extremities    Nursing note and vitals reviewed.      Laboratory  Recent Results (from the past 24 hour(s))   Basic Metabolic Panel    Collection Time: 06/18/19  3:55 PM   Result Value Ref Range    Sodium 135 135 - 145 mmol/L    Potassium 3.7 3.6 - 5.5 mmol/L    Chloride 100 96 - 112 mmol/L    Co2 26 20 - 33 mmol/L    Glucose 102 (H) 65 - 99 mg/dL    Bun 11 8 - 22 mg/dL    Creatinine 0.91 0.50 - 1.40 mg/dL    Calcium 9.1 8.5 - 10.5 mg/dL    Anion Gap 9.0 0.0 - 11.9   MAGNESIUM    Collection Time: 06/18/19  3:55 PM   Result Value Ref Range    Magnesium 1.8 1.5 - 2.5 mg/dL   ESTIMATED GFR    Collection Time: 06/18/19  3:55 PM   Result Value Ref Range    GFR If African American >60 >60 mL/min/1.73 m 2    GFR If Non African American 60 >60 mL/min/1.73 m 2    Basic Metabolic Panel    Collection Time: 06/19/19  3:56 AM   Result Value Ref Range    Sodium 135 135 - 145 mmol/L    Potassium 3.8 3.6 - 5.5 mmol/L    Chloride 102 96 - 112 mmol/L    Co2 24 20 - 33 mmol/L    Glucose 107 (H) 65 - 99 mg/dL    Bun 12 8 - 22 mg/dL    Creatinine 0.91 0.50 - 1.40 mg/dL    Calcium 9.3 8.5 - 10.5 mg/dL    Anion Gap 9.0 0.0 - 11.9   MAGNESIUM    Collection Time: 06/19/19  3:56 AM   Result Value Ref Range    Magnesium 1.7 1.5 - 2.5 mg/dL   ESTIMATED GFR    Collection Time: 06/19/19  3:56 AM   Result Value Ref Range    GFR If African American >60 >60 mL/min/1.73 m 2    GFR If Non African American 60 >60 mL/min/1.73 m 2       Fluids    Intake/Output Summary (Last 24 hours) at 06/19/19 0735  Last data filed at 06/19/19 0400   Gross per 24 hour   Intake                0 ml   Output             1300 ml   Net            -1300 ml       Core Measures & Quality Metrics  Labs reviewed, Medications reviewed and Radiology images reviewed  Avalos catheter: Urinary Tract Retention or Urinary Tract Obstruction      DVT: Eliquis / Plavix.  DVT prophylaxis - mechanical: SCDs  Ulcer prophylaxis: Yes    Assessed for rehab: Patient was assess for and/or received rehabilitation services during this hospitalization    Total Score: 9    ETOH Screening     Assessment complete date: 6/14/2019        Assessment/Plan  Anticoagulated- (present on admission)   Assessment & Plan    Plavix for recent MI  Eliquis for PAF  Received KCentra 1958 units and Vitamin K 10 mg at referring facility.  Additional dose of KCentra 1622 units given in ICU.  TEG with platelet mapping with ADP elevation.  Platelet transfusion held.  6/14 Plavix resumed. Lovenox 40 mg daily cleared by neurosurgery, initiated.  6/17 Cleared to restart Eliquis- stop Lovenox. EKG with frequent ectopy.       Traumatic subarachnoid hemorrhage with loss of consciousness of 30 minutes or less (HCC)- (present on admission)   Assessment & Plan    Acute  subarachnoid hemorrhage is moderate volume with no intra-axial hematoma or hydrocephalus. No significant mass effect.  6/13 Repeat CT scan stable.  6/14 CTA without aneurysm identified  6/17 Repeat CT scan stable.  6/18 Repeat CT scan stable.  Non-operative management.  Post traumatic pharmacologic seizure prophylaxis for 1 week.  Speech Language Pathology; Patient needs 24 hour supervision to address safety and adequate communication  Honey Ferguson MD. Neurosurgery.      Discharge planning issues- (present on admission)   Assessment & Plan    Date of admission: 6/13/2019  Date:6/14 Transfer orders from SICU  Date: 6/16 Rehab/SNF consult   Date: 6/18 [facility]  Cleared for discharge: No  Discharge delayed: No    Discharge date:      Urinary retention   Assessment & Plan    6/13 Badillo insertion for urinary retention.  6/16 Trial badillo removal.  6/17 Replaced for retrention      UTI (urinary tract infection)- (present on admission)   Assessment & Plan    Discharged from [facility] on Cefdinir 300 mg BID for 3 days (completed 6/11).  6/17 WBC rising- Urine with small leuks- afebrile  6/18 WBC decreasing  Monitor      Syncope- (present on admission)   Assessment & Plan    Syncope at home and GLF.  Does report history of syncope since childhood, attributed to vasovagal response. Seizures ruled out.  EKG from referring facility with atrial fibrillation; ventricular tachycardia, unsustained; left bundle branch block.  Repeat EKG with sinus rhythm, ventricular bigeminy, nonspecific IVCD with LAD and LVH with secondary repolarization abnormality.  6/14 Carotid duplex with bilateral plaque of the bifurcation extending into the internal carotid. Velocities are consistent with < 50% stenosis of the internal carotid artery. Incidental finding: Right vertebral artery lies outside of vertebrae until the mid-distal segment. Antegrade flow is observed.  Left side dampened, slightly resistive flow is observed  in the vertebral artery consistent with a possible more distal obstruction  6/14 Echocardiogram: normal chamber sizes. Normal RV size and function. LV function is mild to moderate depressed. LVEF is 40% visually, worse on ectopic beats. There is apical and lateral wall hypokinesis. RVSP estimated to 30-35 mmHg. Mild mitral annular calcification.  6/19 NM Cardiac Stress Test     Essential hypertension- (present on admission)   Assessment & Plan    Chronic condition treated with Maxide.  Resumed maintenance medication.  6/18 Initiate metoprolol.       Depression- (present on admission)   Assessment & Plan    Chronic condition treated with seroquel and Cymbalta.  Not resumed during acute phase due to prolonged QT.  Monitor for Cymbalta withdrawal symptoms.     GERD (gastroesophageal reflux disease)- (present on admission)   Assessment & Plan    Chronic condition treated with Nexium.  Resumed maintenance medication.      Encounter for geriatric assessment- (present on admission)   Assessment & Plan    6/13 Consult placed.  Sebastián Pacheco MD, Geriatrics.      History of MI (myocardial infarction)- (present on admission)   Assessment & Plan    Recently hospitalized at [facility] for MI.  Stent placement.  Discharge on Plavix and Eliquis.  Referring facility troponin 0.05.  Repeat troponin trend down.       Trauma- (present on admission)   Assessment & Plan    Syncope and GLF while on blood thinners. Seen at [facility].  Trauma Red Transfer Activation.  Tyler Barrios MD. Trauma Surgery.          Discussed patient condition with Patient and trauma surgery, Dr. Tyler Barrios .

## 2019-06-19 NOTE — PROGRESS NOTES
Geriatric Progress Note:  6/192019  Chief Complaint: Traumatic Subarachnoid Hemorrhage following syncope while on apixaban and plavix.      S: Patient had become progressively more confused throughout yesterday. By approx 5pm, patient became considerably aphasic (expressive > receptive) and CT Head had been performed showing no acute changes (and old hypodensities seen). By time patient returned from CT, patient's daughters report that patient had been speaking better again but still significantly confused. This morning, patient was severely aphasic (both expressive and receptive) and did not obey commands, muttering the rare understandable word, but alert and active, attempting to ask questions. About 30 minutes later, this aphasia had moderately improved again, where patient was able to almost complete sentences, but still heavily confused, and had difficulty fully expressing her thoughts or questions, and behaved in a flirtatious manner (disinhibited).     The RNs described that the episodes of severe aphasia occurred around time of active movement, or shortly thereafter, making the question about circulatory compromise be considered especially in light of orthostatic hypotensive episodes and bradycardia.     WBC had been elevated x 3 days at 11.8 then 14.4 then 12.8 without one done today. U/A was done when WBC was 14.4 and was negative. Patient spiked a fever yesterday of 38.3 but unfortunately blood cultures at that time were not drawn. Cardiology had planned a stress test today, however, they are likely going to cancel / delay it for when patient is more stable.     Given the heavy confusion, patient likely has not been eating/drinking adequately.   Medication wise, patient received metoprolol for first time yesterday afternoon in order to reduce PVCs and additionally given patient had recent MI with 2 stents placed.    One additional note on history is how patient had unprovoked PE 6 weeks ago, and rapid  "weight loss of 25 pounds over last few months, along with a heavy hx of smoking. Previous c-scope negative, CT PE protocol was done 6 weeks ago, and patient/family did not report any malignancy concerns found but we do not have record of that scan yet.     ROS: a 14 pt ROS was negative other than as stated above.     O:/75   Pulse (!) 54   Temp 36.1 °C (97 °F) (Temporal)   Resp 18   Ht 1.791 m (5' 10.5\")   Wt 81.6 kg (179 lb 14.3 oz)   SpO2 93%   Breastfeeding? No   BMI 25.45 kg/m²   Gen: Appears alert, flirtatious, and on the better of two exams, was able to identify daughters and name one of the two of them. Disoriented otherwise.   Delirium screen: significant fluctuation of course with both understanding and speech, inattentive (for example following command when she understood it, would require repeating the command over and over to keep her focus on doing it), disorganized in her thought pattern (as best as I could tell while she was attempting to communicate on 2nd visit when aphasia moderately improved) and fluctuation of consciousness present.  Neuro: right lower facial droop (chronic), complete aphasia in first exam, 30 minutes later, moderate aphasia (expressive) but receptive seemed intact. Pupils 3mm bilaterally, EOM grossly intact. Negative Pronator Drift. Able to move all four extremities. Moved to command on 2nd exam. Initially GCS = E4M5V3 = 12/15 but later GCS became T1M1V5=19/15 . Reflexes intact and normal. Gait and cerebellum not assessed.      CV: RRR, with ectopy, 2+ left radial pulse - HR approx 60-65 BPM.   Lungs: normal effort CAB, dry intermittent cough   Ext: arthric changes noted to hands bilaterally, well groomed left foot.   Psych: As above in Subjective, and general exam.    Labs/Imaging/EKG: lytes improved today. Mg remains borderline 1.8 -   U/S carotids (select portion of report) :  Incidental finding: Vertebral artery lies outside of vertebrae until the    mid-distal " segment. Antegrade flow is observed. Dampened, slightly resistive flow is observed in the vertebral artery    consistent with a possible more distal obstruction.    CTA Head (not neck) (select portion of report) : The posterior circulation shows the distal vertebral arteries to be patent. The vertebrobasilar confluence is intact. The basilar artery is patent. No aneurysm or occlusive lesion is evident.      Assessment: 77 y/o F with recent PE and MI s/p 2 stents here after having syncope and resultant TBI / tSAH. Current active delirium (of 48 hours duration) with intermittent complete and partial aphasia x 12 hours (CT head negative) with ddx including CNS perfusion issue, seizures, delirium being cause.     Plan:   Syncope  -unclear etiology, concern for cardiac and CNS, cardiology feels unlikely cause of syncope without EKG evidence of significant enough block.    ECHO with EF of 40% which is worse with ectopy.   - orthostatic hypotension still persisting.  - Will give Maintenance fluids while unwell and hold diuretics.  - Posterior circulation may be compromised - CTA did not include neck, while doppler U/S did showing some abnormalities as noted in bold above.   - Up CTA.on discussion between Dr. Pacheco and myself, we will review the EEG, septic work up tomorrow and then consider CTA neck / dedicated posterior circulation    Orthostatic hypotension  -unclear if this was inciting factor for syncope, query other cause as above  -given 500 cc bolus X2 with subjective improvement  -watch resp status  - Patient likely not getting enough fluids while unwell, will start IVF maintenance.    - consider TSH, t4, AM cortisol.     Delirium  - Leukocytosis with Temp spike yesterday suggests possible infectious etiology (though WBC was trending downwards and could be raised in any trauma) and previous U/A negative.  - Patient still has badillo 2/2 urinary retention - will repeat U/A and culture  - Although lower yield without  fever, will do blood cultures x2   - Added CBC as not done today  - rare dry intermittent cough - at risk of aspiration PNA with delirium and apasia - ordered portable CXR to R/O PNA  - missing cymbalat and seroquel.Unlikely would be manifesting at this level especially after days off cymbalta already.      Prolong QTc  Depression  Anxiety  -improved QTc (manually <440 - ignore automatic reading given PVCs may falsely elevate)  -restart 40 cymbalta when stable    Traumatic SAH  -prophylactic keppra x7 days per neurosurgery  -resume anticoag per neurosurgery, now back on clopidogrel and apixaban  - EEG today to rule out breakthrough seizures causing intermittent aphasia    Hyponatremia  -continue maintenance IVF  -watch sodium, improving    R facial droop  Dysarthria  -not new per patient, possibly related to SAH   -outpatient MRI brain to be considered, would not be possible now given patient moving a lot     Recent PE  Recent MI  -monitor HR with metoprolol now on board.  - Continue plavix and apixaban  - Monitor telemetry.     Hypokalemia with borderline HypoMg  - K in normal range, while Mg borderline normal with many PVCs and trigeminy.   - Will add 3g IV MgS to restore some degree of Mg storage.    Thank you for including us in the care of this patient. We will follow along tomorrow.     Jose Daniel Gayle M.D.  Geriatric Physician   Can be reached at extension: [phone]  Monday - Friday 8-5

## 2019-06-19 NOTE — PROCEDURES
VIDEO ELECTROENCEPHALOGRAM REPORT      Referring provider: Dr. Pacheco.     DOS: 6/19/2019 (total recording of 24 minutes).     INDICATION:  Thea Kessler 78 y.o. female presenting with syncope.     CURRENT ANTIEPILEPTIC REGIMEN: None.     TECHNIQUE: 30 channel video electroencephalogram (EEG) was performed in accordance with the international 10-20 system. The study was reviewed in bipolar and referential montages. The recording examined the patient during wakeful and drowsy state(s).     DESCRIPTION OF THE RECORD:  During the wakefulness, the background showed a symmetrical 8 Hz alpha activity posteriorly with amplitude of 70 mV.  There was reactivity to eye closure/opening.  A normal anterior-posterior gradient was noted with faster beta frequencies seen anteriorly.  During drowsiness, theta/delta frequencies were seen.    ACTIVATION PROCEDURES:   Intermittent Photic stimulation was performed in a stepwise fashion from 1 to 30 Hz, but without clear background changes.    ICTAL AND/OR INTERICTAL FINDINGS:   Intermittent mild left temporal slowing.  No clinical events or seizures were reported or recorded during the study.     EKG: sampling of the EKG recording demonstrated sinus rhythm with frequent PVCs.     EVENTS:  None.     INTERPRETATION:  This is an abnormal video EEG recording in the awake and drowsy state(s). There was mild intermittent slowing in the left temporal region. No seizures captured. Clinical and radiological correlation is recommended.    Note: This EEG does not rule out epilepsy.  If the clinical suspicion remains high for seizures, a prolonged recording to capture clinical or subclinical events may be helpful.        Jefferson Lester MD   Epilepsy and Neurodiagnostics.   Clinical  of Neurology [facility].   Diplomate in Neurology, Epilepsy, and Electrodiagnostic Medicine.   Office: [phone]  Fax: [phone]

## 2019-06-19 NOTE — PROGRESS NOTES
Monitor summary, SR57-75, OK.20, QRS.12, QT.42 with PVCscouplets, and trigem per strip from monitor room

## 2019-06-20 PROBLEM — R47.01 APHASIA: Status: ACTIVE | Noted: 2019-01-01

## 2019-06-20 NOTE — DISCHARGE PLANNING
Anticipated Discharge Disposition:   [facility] following    Action:   Rounding, family was at the bedside. Chart review-MRI with new findings. Pt with progressive expressive aphasia.     Addendum:  Talked to Dr. Pacheco and Junito JACOBO who said that pt is not medically clear yet as pt needs a stress test and neuro needs to clear pt. Updates given to daughter Brianne who was very concerned whether insurance would cover acute rehab stay. Explained that CM will send message to IRF . (message sent to Mak Morejon and Rakel)    Addendum:  Jean-Pierre confirmed that Medicare covers inpt rehab 100%      Barriers to Discharge:   Medical clearance  IRF confirmation of acceptance.     Plan:   Follow up with IRF

## 2019-06-20 NOTE — PROGRESS NOTES
Cardiology Follow Up Progress Note    Date of Service  6/20/2019    Attending Physician  Tyler Barrios M.D.    Reason for consultation         History of     Unprovoked PE 6 weeks ago (presented as cough/pneumonia like symptoms), recent MI (non-STEMI) at [facility] status post stent to RCA, recent UTI, left bundle, COPD with more than 100-pack-year smoking, hypertension, anxiety/depression antipsychotics, GERD    Admitted for     Traumatic subarachnoid hemorrhage following syncope while on apixaban and Plavix, was transferred from [facility], patient received vitamin K & Kcentra    Interim Events    6/20/19 Frequent PVCs persists, expressive aphasia noted , can follow some simple commands     Review of Systems  Review of Systems   Expressive aphasia, can follow simple commands     Vital signs in last 24 hours  Temp:  [36.1 °C (97 °F)-37.1 °C (98.8 °F)] 36.1 °C (97 °F)  Pulse:  [60-64] 60  Resp:  [16-17] 16  BP: (105-139)/(57-70) 139/62  SpO2:  [90 %-93 %] 90 %    Physical Exam  Physical Exam   HENT:   Head: Normocephalic.   Eyes: Conjunctivae are normal.   Neck: No JVD present. No thyromegaly present.   Cardiovascular: Normal rate and regular rhythm.    Pulmonary/Chest: She has no wheezes.   Musculoskeletal: She exhibits no edema.   Neurological: She is alert.   Skin: Skin is warm and dry.   Left eye ecchymosis        Lab Review  Lab Results   Component Value Date/Time    WBC 12.3 (H) 06/20/2019 03:53 AM    RBC 4.16 (L) 06/20/2019 03:53 AM    HEMOGLOBIN 12.4 06/20/2019 03:53 AM    HEMATOCRIT 38.8 06/20/2019 03:53 AM    MCV 93.3 06/20/2019 03:53 AM    MCH 29.8 06/20/2019 03:53 AM    MCHC 32.0 (L) 06/20/2019 03:53 AM    MPV 12.0 06/20/2019 03:53 AM      Lab Results   Component Value Date/Time    SODIUM 135 06/19/2019 03:56 AM    POTASSIUM 3.8 06/19/2019 03:56 AM    CHLORIDE 102 06/19/2019 03:56 AM    CO2 24 06/19/2019 03:56 AM    GLUCOSE 107 (H) 06/19/2019 03:56 AM    BUN 12 06/19/2019 03:56 AM     CREATININE 0.91 06/19/2019 03:56 AM      Lab Results   Component Value Date/Time    ASTSGOT 42 06/17/2019 02:42 AM    ALTSGPT 20 06/17/2019 02:42 AM     Lab Results   Component Value Date/Time    TROPONINI 0.04 06/13/2019 02:40 PM               Assessment/Plan    Syncope with traumatic subarachnoid hemorrhage on Plavix and Eliquis  -Non operative management   Etiology of syncope is not entirley clear, vasovagal versus orthostatic hypotension versus CNS vs cardiac arrythmias  -Received reversal on admission  -New progressive expressive aphasia, MRI brain pending  -EEG with no captured seizures     Arrhythmias with frequent PVCs  -Will consider starting Amiodarone in addition to low dose Metoprolol  consider MPI when neuro status stable and MRI head is available  -will evaluate in am      Recent MI  -NSTEMI 5/2019 in [facility] s/p PCI to RCA & OM  -ON Plavix, cleared [place] neuro  -No angina  -NO ASA being on ELiquis       Recent unprovoked PE  continue with Eliquis 5 mg BID, was cleared by neuro     Ischemic cardiomyopathy, LVEF 40 %  -GDMT  -Will add heart meds when BP stable and neuro status improves

## 2019-06-20 NOTE — PROGRESS NOTES
Monitor Summary: SB 56-77, NE .18, QRS .12, QT .42 with frequent PVC, couplet, triplet, trig, big, x2 episodes of 5 beats VTACH, requent PAC per strip from monitor room

## 2019-06-20 NOTE — PROGRESS NOTES
Monitor summary:   SB-SR 54-67  F PVC,TRIG,COUP, TRIP, 5 BTS VT, 4-6 BTS VT, 5 BTS VT, 5 BTS VT   .20/.12/.40.

## 2019-06-20 NOTE — DISCHARGE PLANNING
New onset progressive expressive aphasia.  Brain MRI read is pending.  Cardiac stress test has been postponed @ this time.  TCC remains monitoring.

## 2019-06-20 NOTE — PROGRESS NOTES
Trauma / Surgical Daily Progress Note    Date of Service  6/20/2019    Chief Complaint  78 y.o. female admitted 6/13/2019 with Trauma    Interval Events    MRI imaging with multiple areas of infract   Amiodarone initiated by cardiology for ectopy    UA with moderate leukocytes and many bacteria    - Neurology consult.   - Continue telemetry monitoring.   - Initiate Macrobid.  - NM cardiac stress test deferred.   - [facility] when medically cleared   - Counseled     Review of Systems  Review of Systems   Unable to perform ROS: Mental acuity   Gastrointestinal:        6/20 (+) BM         Vital Signs  Temp:  [36.1 °C (97 °F)-37.1 °C (98.8 °F)] 36.4 °C (97.6 °F)  Pulse:  [56-64] 56  Resp:  [16-18] 18  BP: (105-139)/(57-70) 125/70  SpO2:  [90 %-93 %] 93 %    Physical Exam  Physical Exam   Constitutional: She is cooperative. No distress.   HENT:   Left facial ecchymosis    Eyes: Pupils are equal, round, and reactive to light.   Cardiovascular: Normal rate and regular rhythm.    Pulmonary/Chest: No respiratory distress. She exhibits no tenderness.   Abdominal: She exhibits no distension. There is no tenderness. There is no rebound and no guarding.   Genitourinary:   Genitourinary Comments: Avalos to gravity,    Musculoskeletal:   Moves all extremities    Neurological: She is alert.   Aphasic. Follows commands.    Skin: Skin is warm and dry.   Ecchymosis bilateral upper extremities    Nursing note and vitals reviewed.      Laboratory  Recent Results (from the past 24 hour(s))   CBC WITH DIFFERENTIAL    Collection Time: 06/19/19 12:25 PM   Result Value Ref Range    WBC 13.8 (H) 4.8 - 10.8 K/uL    RBC 4.42 4.20 - 5.40 M/uL    Hemoglobin 13.6 12.0 - 16.0 g/dL    Hematocrit 40.9 37.0 - 47.0 %    MCV 92.5 81.4 - 97.8 fL    MCH 30.8 27.0 - 33.0 pg    MCHC 33.3 (L) 33.6 - 35.0 g/dL    RDW 51.7 (H) 35.9 - 50.0 fL    Platelet Count 190 164 - 446 K/uL    MPV 11.6 9.0 - 12.9 fL    Neutrophils-Polys 76.20 (H) 44.00 - 72.00 %     Lymphocytes 13.90 (L) 22.00 - 41.00 %    Monocytes 8.60 0.00 - 13.40 %    Eosinophils 0.30 0.00 - 6.90 %    Basophils 0.60 0.00 - 1.80 %    Immature Granulocytes 0.40 0.00 - 0.90 %    Nucleated RBC 0.00 /100 WBC    Neutrophils (Absolute) 10.51 (H) 2.00 - 7.15 K/uL    Lymphs (Absolute) 1.92 1.00 - 4.80 K/uL    Monos (Absolute) 1.18 (H) 0.00 - 0.85 K/uL    Eos (Absolute) 0.04 0.00 - 0.51 K/uL    Baso (Absolute) 0.08 0.00 - 0.12 K/uL    Immature Granulocytes (abs) 0.06 0.00 - 0.11 K/uL    NRBC (Absolute) 0.00 K/uL   BLOOD CULTURE    Collection Time: 06/19/19 12:25 PM   Result Value Ref Range    Significant Indicator NEG     Source BLD     Site PERIPHERAL     Culture Result       No Growth  Note: Blood cultures are incubated for 5 days and  are monitored continuously.Positive blood cultures  are called to the RN and reported as soon as  they are identified.     BLOOD CULTURE    Collection Time: 06/19/19 12:25 PM   Result Value Ref Range    Significant Indicator NEG     Source BLD     Site PERIPHERAL     Culture Result       No Growth  Note: Blood cultures are incubated for 5 days and  are monitored continuously.Positive blood cultures  are called to the RN and reported as soon as  they are identified.     URINALYSIS    Collection Time: 06/19/19  1:15 PM   Result Value Ref Range    Color Yellow     Character Turbid (A)     Specific Gravity 1.017 <1.035    Ph 8.0 5.0 - 8.0    Glucose Negative Negative mg/dL    Ketones Trace (A) Negative mg/dL    Protein Negative Negative mg/dL    Bilirubin Negative Negative    Urobilinogen, Urine 0.2 Negative    Nitrite Negative Negative    Leukocyte Esterase Moderate (A) Negative    Occult Blood Trace (A) Negative    Micro Urine Req Microscopic    URINE MICROSCOPIC (W/UA)    Collection Time: 06/19/19  1:15 PM   Result Value Ref Range    WBC 20-50 (A) /hpf    RBC 10-20 (A) /hpf    Bacteria Many (A) None /hpf    Epithelial Cells Negative /hpf    Hyaline Cast 3-5 (A) /lpf   CBC WITH  DIFFERENTIAL    Collection Time: 06/20/19  3:53 AM   Result Value Ref Range    WBC 12.3 (H) 4.8 - 10.8 K/uL    RBC 4.16 (L) 4.20 - 5.40 M/uL    Hemoglobin 12.4 12.0 - 16.0 g/dL    Hematocrit 38.8 37.0 - 47.0 %    MCV 93.3 81.4 - 97.8 fL    MCH 29.8 27.0 - 33.0 pg    MCHC 32.0 (L) 33.6 - 35.0 g/dL    RDW 52.3 (H) 35.9 - 50.0 fL    Platelet Count 183 164 - 446 K/uL    MPV 12.0 9.0 - 12.9 fL    Neutrophils-Polys 68.70 44.00 - 72.00 %    Lymphocytes 17.60 (L) 22.00 - 41.00 %    Monocytes 10.90 0.00 - 13.40 %    Eosinophils 1.20 0.00 - 6.90 %    Basophils 1.10 0.00 - 1.80 %    Immature Granulocytes 0.50 0.00 - 0.90 %    Nucleated RBC 0.00 /100 WBC    Neutrophils (Absolute) 8.47 (H) 2.00 - 7.15 K/uL    Lymphs (Absolute) 2.17 1.00 - 4.80 K/uL    Monos (Absolute) 1.34 (H) 0.00 - 0.85 K/uL    Eos (Absolute) 0.15 0.00 - 0.51 K/uL    Baso (Absolute) 0.13 (H) 0.00 - 0.12 K/uL    Immature Granulocytes (abs) 0.06 0.00 - 0.11 K/uL    NRBC (Absolute) 0.00 K/uL       Fluids    Intake/Output Summary (Last 24 hours) at 06/20/19 0950  Last data filed at 06/20/19 0400   Gross per 24 hour   Intake              240 ml   Output              900 ml   Net             -660 ml       Core Measures & Quality Metrics  Labs reviewed, Medications reviewed and Radiology images reviewed  Badillo Catheter: Trial badillo removal       DVT: Eliquis / Plavix.  DVT prophylaxis - mechanical: SCDs  Ulcer prophylaxis: Yes    Assessed for rehab: Patient was assess for and/or received rehabilitation services during this hospitalization    Total Score: 9    ETOH Screening     Assessment complete date: 6/14/2019        Assessment/Plan  Aphasia- (present on admission)   Assessment & Plan    9/19 MRI imaging with scattered multifocal punctate areas of deep white matter infarction involving the frontal parietal regions near the vertex and cerebellar hemispheres and a punctate area of infarction in the left occipitalpole region. Subtle recent moderate-sized area of  infarction involving the left posterior parasylvian region and adjacent temporal parietal region.  Neurology consult     Anticoagulated- (present on admission)   Assessment & Plan    Plavix for recent MI  Eliquis for PAF  Received KCentra 1958 units and Vitamin K 10 mg at referring facility.  Additional dose of KCentra 1622 units given in ICU.  TEG with platelet mapping with ADP elevation.  Platelet transfusion held.  6/14 Plavix resumed. Lovenox 40 mg daily cleared by neurosurgery, initiated.  6/17 Cleared to restart Eliquis- stop Lovenox. EKG with frequent ectopy.       Traumatic subarachnoid hemorrhage with loss of consciousness of 30 minutes or less (HCC)- (present on admission)   Assessment & Plan    Acute subarachnoid hemorrhage is moderate volume with no intra-axial hematoma or hydrocephalus. No significant mass effect.  6/13 Repeat CT scan stable.  6/14 CTA without aneurysm identified  6/17 Repeat CT scan stable.  6/18 Repeat CT scan stable.  Non-operative management.  Post traumatic pharmacologic seizure prophylaxis for 1 week.  Speech Language Pathology; Patient needs 24 hour supervision to address safety and adequate communication  Honey Ferguson MD. Neurosurgery.      Discharge planning issues- (present on admission)   Assessment & Plan    Date of admission: 6/13/2019  Date:6/14 Transfer orders from SICU  Date: 6/16 Rehab/SNF consult   Date: 6/18 Accepted to [facility]  Cleared for discharge: No  Discharge delayed: No    Discharge date:      Urinary retention   Assessment & Plan    6/13 Badillo insertion for urinary retention.  6/16 Trial badillo removal.  6/17 Replaced for retrention   6/20 Trial badillo removal      UTI (urinary tract infection)- (present on admission)   Assessment & Plan    Discharged from [facility] on Cefdinir 300 mg BID for 3 days (completed 6/11).  6/17 WBC rising- Urine with small leuks- afebrile  6/19 UA with moderate leukocytes and many bacteria.  Macrobid x 5 days  Monitor       Syncope- (present on admission)   Assessment & Plan    Syncope at home and GLF.  Does report history of syncope since childhood, attributed to vasovagal response. Seizures ruled out.  EKG from referring facility with atrial fibrillation; ventricular tachycardia, unsustained; left bundle branch block.  Repeat EKG with sinus rhythm, ventricular bigeminy, nonspecific IVCD with LAD and LVH with secondary repolarization abnormality.  6/14 Carotid duplex with bilateral plaque of the bifurcation extending into the internal carotid. Velocities are consistent with < 50% stenosis of the internal carotid artery. Incidental finding: Right vertebral artery lies outside of vertebrae until the mid-distal segment. Antegrade flow is observed.  Left side dampened, slightly resistive flow is observed in the vertebral artery consistent with a possible more distal obstruction  6/14 Echocardiogram: normal chamber sizes. Normal RV size and function. LV function is mild to moderate depressed. LVEF is 40% visually, worse on ectopic beats. There is apical and lateral wall hypokinesis. RVSP estimated to 30-35 mmHg. Mild mitral annular calcification.  6/20 Amiodarone initiated    NM Cardiac Stress Test when patient able      Essential hypertension- (present on admission)   Assessment & Plan    Chronic condition treated with Maxide.  Resumed maintenance medication.  6/18 Initiate metoprolol.       Depression- (present on admission)   Assessment & Plan    Chronic condition treated with seroquel and Cymbalta.  Not resumed during acute phase due to prolonged QT.  Monitor for Cymbalta withdrawal symptoms.     GERD (gastroesophageal reflux disease)- (present on admission)   Assessment & Plan    Chronic condition treated with Nexium.  Resumed maintenance medication.      Encounter for geriatric assessment- (present on admission)   Assessment & Plan    6/13 Consult placed.  Sebastián Pacheco MD, Geriatrics.      History of MI (myocardial  infarction)- (present on admission)   Assessment & Plan    Recently hospitalized at [facility] for MI.  Stent placement.  Discharge on Plavix and Eliquis.  Referring facility troponin 0.05.  Repeat troponin trend down.       Trauma- (present on admission)   Assessment & Plan    Syncope and GLF while on blood thinners. Seen at [facility].  Trauma Red Transfer Activation.  Tyler Barrios MD. Trauma Surgery.          Discussed patient condition with Patient and trauma surgery, Dr. Tyler Barrios.

## 2019-06-20 NOTE — PROGRESS NOTES
Monitor Summary: [id number], KS .20, QRS .10, QT .40 with PVCs, PACs, trip, coup per strip from monitor room.

## 2019-06-20 NOTE — ASSESSMENT & PLAN NOTE
6/19 - EEG with no captured seizure activity   - MRI imaging with scattered multifocal punctate areas of deep white matter infarction involving the frontal parietal regions near the vertex and cerebellar hemispheres and a punctate area of infarction in the left occipitalpole region. Subtle recent moderate-sized area of infarction involving the left posterior parasylvian region and adjacent temporal parietal region.  Anthony Ramirez MD. Neurology

## 2019-06-21 NOTE — PROGRESS NOTES
Neurosurgery Progress Note    Subjective:  Patient with aphasia last several days found to be obtunded this morning  STAT head ct ordered        OBjective:  Patient is awake  Facial droop  Non-verbal  Not following commands  Localizing pain  PERRL      CT unchanged, no acute hemorrhage, SAH not worse, mild ventricle enlargement, small vessel ischemic changes            BP  Min: 116/57  Max: 138/77  Pulse  Av  Min: 53  Max: 69  Resp  Av.4  Min: 18  Max: 20  Temp  Av.6 °C (97.8 °F)  Min: 36.2 °C (97.1 °F)  Max: 37.2 °C (98.9 °F)  SpO2  Av.2 %  Min: 92 %  Max: 94 %    No Data Recorded    Recent Labs      19   1225  19   0353   WBC  13.8*  12.3*   RBC  4.42  4.16*   HEMOGLOBIN  13.6  12.4   HEMATOCRIT  40.9  38.8   MCV  92.5  93.3   MCH  30.8  29.8   MCHC  33.3*  32.0*   RDW  51.7*  52.3*   PLATELETCT  190  183   MPV  11.6  12.0     Recent Labs      19   1555  19   0356  19   0353   SODIUM  135  135  135   POTASSIUM  3.7  3.8  3.5*   CHLORIDE  100  102  104   CO2  26  24  22   GLUCOSE  102*  107*  102*   BUN  11  12  12   CREATININE  0.91  0.91  0.93   CALCIUM  9.1  9.3  9.0               Intake/Output       19 0700 - 19 0659 19 07 - 19 59       Total  Total       Intake    P.O.  240  -- 240  --  -- --    P.O. 240 -- 240 -- -- --    Total Intake 240 -- 240 -- -- --       Output    Stool  --  -- --  --  -- --    Number of Times Stooled 2 x 3 x 5 x -- -- --    Total Output -- -- -- -- -- --       Net I/O     240 -- 240 -- -- --            Intake/Output Summary (Last 24 hours) at 19 0850  Last data filed at 19 1200   Gross per 24 hour   Intake              240 ml   Output                0 ml   Net              240 ml       $ Bladder Scan Results (mL): 171    • D5W   Continuous   • amiodarone infusion  0.5-1 mg/min Continuous   • amiodarone 150 mg IVPB (LOAD)  150 mg Once   • Pharmacy  1 Each  PHARMACY TO DOSE   • amiodarone  400 mg TID   • apixaban  5 mg BID   • [START ON 6/22/2019] clopidogrel  75 mg DAILY   • [START ON 6/22/2019] magnesium hydroxide  30 mL DAILY   • metoprolol  25 mg TWICE DAILY   • [START ON 6/22/2019] omeprazole 2 mg/mL in sodium bicarbonate  40 mg DAILY   • polyethylene glycol/lytes  1 Packet BID   • acetaminophen  650 mg Q6HRS PRN   • acetaminophen/caffeine/butalbital 325-40-50 mg  1 Tab Q6HRS PRN   • prochlorperazine  5 mg Q6HRS PRN   • senna-docusate  1 Tab Q24HRS PRN   • LR   Continuous   • labetalol  10 mg Q4HRS PRN   • ondansetron  4 mg Q4HRS PRN   • ipratropium-albuterol  3 mL Q4HRS PRN   • Respiratory Care per Protocol   Continuous RT   • senna-docusate  1 Tab Nightly   • bisacodyl  10 mg Q24HRS PRN   • fleet  1 Each Once PRN   • acetaminophen  650 mg Q4HRS PRN   • Pharmacy Consult Request  1 Each PHARMACY TO DOSE   • MD ALERT...DO NOT ADMINISTER NSAIDS or ASPIRIN unless ORDERED By Neurosurgery  1 Each PRN   • hydrALAZINE  10 mg Q HOUR PRN           Assessment and Plan:  Hospital day #8  POD #0  Prophylactic anticoagulation:yes       Start date/time: Apixaban and Plavix started 6/17        Plan:  1. Non-surgical from NSX standpoint  2. Cleared for anticoagulation

## 2019-06-21 NOTE — PROGRESS NOTES
0745Report received, plan of care reviewed and discussed, assessment complete, oriented to room, bed alarm on, nonskid socks applied, advised to call for assistance.   0815 MD at bedside and trauma NP to assess, transfer orders to ICU, report given to ICU RN, transfer to ICU.

## 2019-06-21 NOTE — PROGRESS NOTES
Cardiology Follow Up Progress Note    Date of Service  6/21/2019    Attending Physician  Tyler Barrios M.D.    Chief Complaint   Subarachnoid hemorrhage    HPI  Thea Kessler is a 78 y.o. female admitted 6/13/2019 with recent history of two-vessel stenting for ACS at [facility], ejection fraction about 40%.  Transferred here about a week later for a subarachnoid hemorrhage  This was reversed - both platelets and anticoag    Subsequent concern for embolic stroke addressed by neurosurgery and neurology, perhaps provoked by reversal    Interim Events    Further decline, patient is nonresponsive and near comatose    Review of Systems  Review of Systems   Unable to perform ROS: Acuity of condition       Vital signs in last 24 hours  Temp:  [36.1 °C (97 °F)-37.2 °C (98.9 °F)] 36.6 °C (97.9 °F)  Pulse:  [53-69] 61  Resp:  [16-22] 22  BP: (116-138)/(57-77) 136/76  SpO2:  [91 %-94 %] 93 %    Physical Exam  Physical Exam   Constitutional: No distress.   Profound right-sided neglect, responds to stimulus and pain on the left upper extremity.  Somnolent difficult to arouse with sternal rub   HENT:   Nose: Nose normal.   Mouth/Throat: No oropharyngeal exudate.   Eyes: Conjunctivae and EOM are normal. No scleral icterus.   Neck: No JVD present. No thyromegaly present.   Cardiovascular:   Still PVCs noted on telemetry no A. fib   Pulmonary/Chest: No respiratory distress. She has no wheezes. She has no rales. She exhibits no tenderness.   Abdominal: Bowel sounds are normal. She exhibits no distension. There is no tenderness.   Musculoskeletal: She exhibits no edema or tenderness.   Lymphadenopathy:     She has no cervical adenopathy.   Neurological: A cranial nerve deficit is present. Coordination abnormal.   Skin: Skin is warm and dry.       Lab Review  Lab Results   Component Value Date/Time    WBC 12.3 (H) 06/20/2019 03:53 AM    RBC 4.16 (L) 06/20/2019 03:53 AM    HEMOGLOBIN 12.4 06/20/2019 03:53 AM     HEMATOCRIT 38.8 06/20/2019 03:53 AM    MCV 93.3 06/20/2019 03:53 AM    MCH 29.8 06/20/2019 03:53 AM    MCHC 32.0 (L) 06/20/2019 03:53 AM    MPV 12.0 06/20/2019 03:53 AM      Lab Results   Component Value Date/Time    SODIUM 135 06/20/2019 03:53 AM    POTASSIUM 3.5 (L) 06/20/2019 03:53 AM    CHLORIDE 104 06/20/2019 03:53 AM    CO2 22 06/20/2019 03:53 AM    GLUCOSE 102 (H) 06/20/2019 03:53 AM    BUN 12 06/20/2019 03:53 AM    CREATININE 0.93 06/20/2019 03:53 AM      Lab Results   Component Value Date/Time    ASTSGOT 42 06/17/2019 02:42 AM    ALTSGPT 20 06/17/2019 02:42 AM     Lab Results   Component Value Date/Time    TROPONINI 0.04 06/13/2019 02:40 PM             Cardiac Imaging and Procedures Review  EKG:  My personal interpretation of the EKG dated yesterday sinus with PVCs  Echocardiogram: Ejection fraction about 40%    Cardiac Catheterization: In [place], detailed in my H&P    Imaging  Chest X-Ray: Yesterday no infiltrate    Assessment/Plan    Coronary disease  Recent stenting for chest pain at the outside hospital  Resumption of dual antiplatelet therapy okayed by neurosurgery  No evidence of acuity or active ischemia  We are not able to proceed with noninvasive work-up because of her neurological decline    PVCs  I do not think these relate to her current situation  We have not noted any fibrillation during her admission on monitoring  Suppression with amiodarone in the setting of other issues to see if this improves her cardiac output, change to intravenous as she currently cannot tolerate swallowing    Congestive heart failure  Not active on my exam  Echo confirms chronic ejection fraction 40% which is unchanged from prior hospitalization    Prognosis quite grim  Agree with palliative care consult, will sign off but do not hesitate to call us in the meantime if there are questions arriving particularly about her amiodarone drip    Thank you for allowing me to participate in the care of this  patient.  Discussed with Dr Mancera and Melony & RN at bedside  Please contact me with any questions.    Felicitas Anthony M.D.   Cardiologist, [facility]  [phone]

## 2019-06-21 NOTE — CONSULTS
Referring Physician: Dr. Tyler Bariros    Referral Reason: Stroke      HPI:    Thea Kessler is a 78 y.o. female who was admitted to the hospital after a ground-level fall while on apixaban and Plavix led to a traumatic subarachnoid hemorrhage.  She was recovering as expected until the evening of June 18 when she became confused and developed an expressive aphasia.  An MRI of the brain was completed on June 19 which showed multifocal punctate infarcts involving the gray-white matter junction of the bilateral cerebral hemispheres as well as the bilateral cerebellar hemispheres.  She also has a larger wedge shaped infarct in the posterior left frontal lobe.    The patient today continues to have expressive aphasia that she finds frustrating.  She denies any change in her symptoms over the last 24 hours.  Her speech may be slightly better than it was yesterday.  She denies any vision changes.  She denies any numbness.  She denies any significant headache.    She has been taken off apixaban on presentation and this was resumed about 48 hours ago.          ROS:   Review of Systems   All other systems reviewed and are negative.      Past Medical History:   Past Medical History:   Diagnosis Date   • Arthritis    • Depression    • GERD (gastroesophageal reflux disease)    • Hypokalemia    • Swelling     Left side of body, pt states will swell on Left side unless taking Maxide       Past Surgical History:   Past Surgical History:   Procedure Laterality Date   • APPENDECTOMY     • CHOLECYSTECTOMY     • HEMORRHOIDECTOMY         Social History:   Social History     Social History   • Marital status:      Spouse name: N/A   • Number of children: N/A   • Years of education: N/A     Occupational History   • Not on file.     Social History Main Topics   • Smoking status: Current Every Day Smoker   • Smokeless tobacco: Not on file   • Alcohol use Yes   • Drug use: No   • Sexual activity: Not on file     Other Topics  Concern   • Not on file     Social History Narrative   • No narrative on file       Family Hx: No family history on file.    Current Medications:   Current Facility-Administered Medications   Medication Dose Route Frequency Provider Last Rate Last Dose   • amiodarone (CORDARONE) tablet 400 mg  400 mg Oral TID Everardo Casanova, A.P.N.   400 mg at 06/20/19 1742   • metoprolol (LOPRESSOR) tablet 25 mg  25 mg Oral TWICE DAILY Everardo Casanova, A.P.N.   25 mg at 06/20/19 1742   • nitrofurantoin monohyd macro (MACROBID) capsule CAPS 100 mg  100 mg Oral BID WITH MEALS Junito Ty, A.P.N.   100 mg at 06/20/19 1743   • lactated ringers infusion   Intravenous Continuous Junito Ty, A.P.N. 75 mL/hr at 06/20/19 1056     • labetalol (NORMODYNE,TRANDATE) injection 10 mg  10 mg Intravenous Q4HRS PRN Junito Ty, A.P.N.       • apixaban (ELIQUIS) tablet 5 mg  5 mg Oral BID Lyudmila Fuentes, A.P.N.   5 mg at 06/20/19 1800   • ondansetron (ZOFRAN ODT) dispertab 4 mg  4 mg Oral Q4HRS PRN Jose Daniel Gayle M.D.       • prochlorperazine (COMPAZINE) tablet 5 mg  5 mg Oral Q6HRS PRN Jose Daniel Gayle M.D.   5 mg at 06/18/19 0556   • acetaminophen/caffeine/butalbital 325-40-50 mg (FIORICET) -40 MG per tablet 1 Tab  1 Tab Oral Q6HRS PRN Lyudmila Fuentes, A.P.N.   1 Tab at 06/18/19 1758   • clopidogrel (PLAVIX) tablet 75 mg  75 mg Oral DAILY Marcos Spain M.D.   75 mg at 06/20/19 0523   • ipratropium-albuterol (DUONEB) nebulizer solution  3 mL Nebulization Q4HRS PRN Marcos Spain M.D.       • omeprazole (PRILOSEC) capsule 20 mg  20 mg Oral DAILY Marcos Spain M.D.   20 mg at 06/20/19 0523   • ALPRAZolam (XANAX) tablet 0.25 mg  0.25 mg Oral QDAY PRN Mary Johnson, A.P.R.N.   0.25 mg at 06/19/19 2050   • acetaminophen (TYLENOL) tablet 650 mg  650 mg Oral Q6HRS PRN Mary Johnson, A.P.R.N.       • Respiratory Care per Protocol   Nebulization Continuous RT Mary Johnson, A.P.R.N.       • senna-docusate (PERICOLACE or  SENOKOT S) 8.6-50 MG per tablet 1 Tab  1 Tab Oral Nightly Mary Alex, A.P.R.N.   1 Tab at 06/18/19 2216   • senna-docusate (PERICOLACE or SENOKOT S) 8.6-50 MG per tablet 1 Tab  1 Tab Oral Q24HRS PRN Mary Johnsonin, A.P.R.N.       • polyethylene glycol/lytes (MIRALAX) PACKET 1 Packet  1 Packet Oral BID Mary Alex, A.P.R.N.   Stopped at 06/20/19 0600   • magnesium hydroxide (MILK OF MAGNESIA) suspension 30 mL  30 mL Oral DAILY Mary Alex, A.P.R.N.   Stopped at 06/20/19 0600   • bisacodyl (DULCOLAX) suppository 10 mg  10 mg Rectal Q24HRS PRN Mary Johnson, A.P.R.N.       • fleet enema 133 mL  1 Each Rectal Once PRN Mary Johnson, A.P.R.N.       • acetaminophen (TYLENOL) suppository 650 mg  650 mg Rectal Q4HRS PRN Mary Johnsonin, A.P.R.N.       • Pharmacy Consult Request ...Pain Management Review 1 Each  1 Each Other PHARMACY TO DOSE Honey Ferguson M.D.       • MD ALERT...DO NOT ADMINISTER NSAIDS or ASPIRIN unless ORDERED By Neurosurgery 1 Each  1 Each Other PRN Honey Ferguson M.D.       • hydrALAZINE (APRESOLINE) injection 10 mg  10 mg Intravenous Q HOUR PRN Honey Ferguson M.D.           Allergies:   Allergies   Allergen Reactions   • Lipitor [Atorvastatin Calcium]      And related meds   • Pcn [Penicillins] Hives and Swelling   • Sulfa Drugs Hives and Swelling         Physical Exam:   Vitals:    06/20/19 0858 06/20/19 1231 06/20/19 1618 06/20/19 1742   BP: 125/70 116/57 123/64 121/61   Pulse: (!) 56 60 67 69   Resp: 18 20 18    Temp: 36.4 °C (97.6 °F) 36.3 °C (97.3 °F) 37.2 °C (98.9 °F)    TempSrc: Temporal Temporal Temporal    SpO2: 93% 92% 93%    Weight:       Height:           Physical Exam  GENERAL:  Lying in the hospital bed in no apparent distress.  MENTAL STATUS:  Awake, alert, oriented times 3.  She has an expressive aphasia with frequent word blocking and word finding difficulties.  She was able to read several simple words such as mama, 50-50, and tiptop.  Her comprehension appears largely intact although she  struggled with one complex command.  CRANIAL NERVES:  PERRL, EOMI with no nystagmus, right facial droop, facial sensation is intact, tongue is in the midline, palate is symmetric.  MOTOR:  5/5 throughout, no drift  REFLEXES:  2+ and symmetric, toes are downgoing bilaterally  SENSATION:  Intact to light touch and proprioception throughout  COORDINATION: Mild dysmetria finger-to-nose bilaterally  GAIT:  Deferred    NIH Stroke Scale:    1a. Level of Consciousness (Alert, drowsy, etc): 0= Alert    1b. LOC Questions (Month, age): 1= Answers one correctly    1c. LOC Commands (Open/close eyes make fist/let go): 0= Obeys both correctly    2.   Best Gaze (Eyes open - patient follows examiner's finger on face): 0= Normal    3.   Visual Fields (introduce visual stimulus/threat to patient's field quadrants): 0= No visual loss    4.   Facial Paresis (Show teeth, raise eyebrows and squeeze eyes shut): 2 = Partial     5a. Motor Arm - Left (Elevate arm to 90 degrees if patient is sitting, 45 degrees if  supine): 0= No drift    5b. Motor Arm - Right (Elevate arm to 90 degrees if patient is sitting, 45 degrees if supine): 0= No drift    6a. Motor Leg - Left (Elevate leg 30 degrees with patient supine): 0= No drift    6b. Motor Leg - Right  (Elevate leg 30 degrees with patient supine): 0= No drift    7.   Limb Ataxia (Finger-nose, heel down shin): 2- Present in 2 limbs    8.   Sensory (Pin prick to face, arm, trunk and leg - compare side to side): 0= Normal    9.  Best Language (Name item, describe a picture and read sentences): 1= Mild to moderate aphasia    10. Dysarthria (Evaluate speech clarity by patient repeating listed words): 0= Normal articulation    11. Extinction and Inattention (Use information from prior testing to identify neglect or  double simultaneous stimuli testing): 0= No neglect    Total NIH Score: 6    Labs:  Recent Labs      06/18/19   0235  06/19/19   1225  06/20/19   0353   WBC  12.8*  13.8*  12.3*   RBC  4.73   4.42  4.16*   HEMOGLOBIN  14.5  13.6  12.4   HEMATOCRIT  43.6  40.9  38.8   MCV  92.2  92.5  93.3   MCH  30.7  30.8  29.8   MCHC  33.3*  33.3*  32.0*   RDW  50.5*  51.7*  52.3*   PLATELETCT  225  190  183   MPV  11.6  11.6  12.0     Recent Labs      06/18/19   1555  06/19/19   0356  06/20/19   0353   SODIUM  135  135  135   POTASSIUM  3.7  3.8  3.5*   CHLORIDE  100  102  104   CO2  26  24  22   GLUCOSE  102*  107*  102*   BUN  11  12  12   CREATININE  0.91  0.91  0.93   CALCIUM  9.1  9.3  9.0                     Recent Labs      06/18/19   1555  06/19/19   0356  06/20/19   0353   SODIUM  135  135  135   POTASSIUM  3.7  3.8  3.5*   CHLORIDE  100  102  104   CO2  26  24  22   GLUCOSE  102*  107*  102*   BUN  11  12  12     Recent Labs      06/18/19   1555  06/19/19   0356  06/20/19   0353   SODIUM  135  135  135   POTASSIUM  3.7  3.8  3.5*   CHLORIDE  100  102  104   CO2  26  24  22   BUN  11  12  12   CREATININE  0.91  0.91  0.93   MAGNESIUM  1.8  1.7  2.4   CALCIUM  9.1  9.3  9.0         No results found for this or any previous visit.      Imaging reviewed:    MR-BRAIN-W/O   Final Result         1. Age-related cerebral atrophy.      2. Moderate periventricular and juxtacortical white matter changes consistent with chronic microvascular ischemic gliosis.      3. Scattered multifocal punctate areas of deep white matter infarction involving the frontal parietal regions near the vertex and cerebellar hemispheres inferiorly right greater than left. Also there is a punctate area of infarction in the left occipital    pole region.      4. Subtle recent moderate-sized gyriform area of infarction involving the left posterior parasylvian region and adjacent temporal parietal region.      5. Diffuse acute subarachnoid hemorrhage.      6. Minimal acute subdural hematoma adjacent to the right superior lateral frontal lobe.      DX-CHEST-PORTABLE (1 VIEW)   Final Result      No consolidation      CT-HEAD W/O   Final Result       Stable noncontrast CT no new acute intracranial hemorrhage, mass effect, hydrocephalus or subacute infarction.      Left greater than right subarachnoid hemorrhage is stable      Severe white matter hypodensity is unchanged.  This is a nonspecific finding which usually is found to represent chronic microvascular disease in patient's of this demographic.  Demyelination, age indeterminant ischemia and gliosis are also common    possibilities.      CT-HEAD W/O   Final Result      1.  Decreased subarachnoid blood   2.  Atrophy   3.  White matter changes      EC-ECHOCARDIOGRAM COMPLETE W/ CONT   Final Result      US-TRAUMA VEIN SCREEN LOWER BILAT EXTREMITY   Final Result      US-CAROTID DOPPLER BILAT   Final Result      CT-CTA HEAD WITH & W/O-POST PROCESS   Final Result         1. No aneurysm identified in the major intracranial vessels.      2. Redemonstration of bilateral subarachnoid hemorrhage.      CT-HEAD W/O   Final Result      1.  Unchanged subarachnoid hemorrhage bilaterally, extending into the basal cisterns. No hydrocephalus. Awaiting CTA.   2.  Mild-to-moderate global parenchymal atrophy. Chronic small vessel ischemic changes.      US-ABORTED US PROCEDURE    (Results Pending)          Assessment/Plan:  Thea is a 78 y.o. female who suffered a ground-level fall resulting in a traumatic subarachnoid hemorrhage.  During the hospital stay she developed expressive aphasia and an MRI is confirmed multifocal embolic appearing strokes likely cardioembolic in nature due to her apixaban being held.  I wonder whether she may have had a mural thrombus given her recent myocardial infarction and wall motion abnormalities evident on the TTE.  Vessel imaging was done which showed no large vessel atherosclerosis.    1.  Apixaban has already been resumed and I agree with this course of action.  2.  I think she would be an excellent candidate for acute rehabilitation.  She is stable from a neurological standpoint transfer  there when cleared by other medical services.  3.  Continue PT OT and speech therapy while on the inpatient setting  4.  She reports an allergy to statins but this has not been started.  This is likely okay from a stroke standpoint as the etiology was almost certainly cardioembolic.  5.  I will continue to follow the patient.

## 2019-06-21 NOTE — CARE PLAN
Problem: Safety  Goal: Will remain free from injury  Outcome: PROGRESSING AS EXPECTED  Precautions in place, no injury noted, pt calls appropriately, call light in reach, bed alarmed, hourly rounding    Problem: Infection  Goal: Will remain free from infection  Outcome: PROGRESSING AS EXPECTED  VSS, Afebrile, AO, breaths normal, UO WNL, skin WNL. Hand hygiene in place    Problem: Knowledge Deficit  Goal: Knowledge of disease process/condition, treatment plan, diagnostic tests, and medications will improve  Outcome: PROGRESSING AS EXPECTED

## 2019-06-21 NOTE — THERAPY
Speech Therapy Contact Note:  pt found obtunded this morning and moved to higher level of care. pt now NPO with plan for Cortrak. SLP initially working on aphasia and cognition. May need assessment for swallowing with this new change in status. Await orders if appropriate.

## 2019-06-21 NOTE — PROGRESS NOTES
"/77   Pulse (!) 53   Temp 36.2 °C (97.1 °F) (Temporal)   Resp 18   Ht 1.791 m (5' 10.5\")   Wt 81.6 kg (179 lb 14.3 oz)   SpO2 94%     Notified by nursing of decreased level of consciousness. RN reports decreased responsiveness, markedly changed from her baseline. Unable awake enough to take morning medication. Reports new incontinence.     Patient is on Plavix and Eliquis. Hx of traumatic SAH. MRI with multiple recent infarcts. Followed by neurology.    Patient assessed: Obtunded. Responds to sternal rub and pain stimuli. Withdraws to pain on all 4 with delayed response on right side. Pupils equal, reactive.     Plan:   - STAT head CT  "

## 2019-06-21 NOTE — PROGRESS NOTES
Trauma / Surgical Daily Progress Note    Date of Service  6/21/2019    Chief Complaint  78 y.o. female admitted 6/13/2019 with Trauma    Interval Events  Return jto ICU for mental status changes.    New hemiparesis.  CT head: SAH only  Remains fully anticoagulated  Family and patient request comfort care  Hospice consult pending            Review of Systems  Review of Systems     Vital Signs for last 24 hours  Temp:  [36.1 °C (97 °F)-37.2 °C (98.9 °F)] 36.1 °C (97 °F)  Pulse:  [53-69] 61  Resp:  [16-20] 16  BP: (116-138)/(57-77) 136/76  SpO2:  [91 %-94 %] 91 %    Hemodynamic parameters for last 24 hours       Respiratory Data     Respiration: 16, Pulse Oximetry: 91 %     Work Of Breathing / Effort: Mild  RUL Breath Sounds: Diminished, RML Breath Sounds: Diminished, RLL Breath Sounds: Diminished, GBAY Breath Sounds: Diminished, LLL Breath Sounds: Diminished    Physical Exam  Physical Exam   Constitutional:   Decpressed mental status    HENT:   Left facial ecchymosis    Eyes: Pupils are equal, round, and reactive to light. Conjunctivae are normal.   Neck: No JVD present.   Cardiovascular: Normal rate.    Pulmonary/Chest: No respiratory distress.   Abdominal: She exhibits no distension. There is no tenderness.   Neurological: GCS eye subscore is 4. GCS verbal subscore is 1. GCS motor subscore is 4.   Right hemiparesis   Skin: Skin is warm and dry.   Nursing note and vitals reviewed.      Laboratory  No results found for this or any previous visit (from the past 24 hour(s)).    Fluids    Intake/Output Summary (Last 24 hours) at 06/21/19 0906  Last data filed at 06/20/19 1200   Gross per 24 hour   Intake              240 ml   Output                0 ml   Net              240 ml       Core Measures & Quality Metrics  Labs reviewed, Medications reviewed and Radiology images reviewed          DVT prophylaxis - mechanical: SCDs          LAYTON Score  ETOH Screening    Assessment/Plan  Aphasia- (present on admission)    Assessment & Plan    6/19 - EEG with no captured seizure activity   - MRI imaging with scattered multifocal punctate areas of deep white matter infarction involving the frontal parietal regions near the vertex and cerebellar hemispheres and a punctate area of infarction in the left occipitalpole region. Subtle recent moderate-sized area of infarction involving the left posterior parasylvian region and adjacent temporal parietal region.  Anthony Ramirez MD. Neurology     Discharge planning issues- (present on admission)   Assessment & Plan    Date of admission: 6/13/2019  Date:6/14 Transfer orders from SICU  Date: 6/16 Rehab/SNF consult   Date: 6/18 Accepted to [facility]  Date: 6/21 Palliative care consult   Cleared for discharge: No  Discharge delayed: No    Discharge date:       Anticoagulated- (present on admission)   Assessment & Plan    Plavix for recent MI  Eliquis for PAF  Received KCentra 1958 units and Vitamin K 10 mg at referring facility.  Additional dose of KCentra 1622 units given in ICU.  TEG with platelet mapping with ADP elevation.  Platelet transfusion held.  6/14 Plavix resumed. Lovenox 40 mg daily cleared by neurosurgery, initiated.  6/17 Cleared to restart Eliquis- stop Lovenox. EKG with frequent ectopy.        Traumatic subarachnoid hemorrhage with loss of consciousness of 30 minutes or less (HCC)- (present on admission)   Assessment & Plan    Acute subarachnoid hemorrhage is moderate volume with no intra-axial hematoma or hydrocephalus. No significant mass effect.  6/13 Repeat CT scan stable.  6/14 CTA without aneurysm identified  6/17 Repeat CT scan stable.  6/18 Repeat CT scan stable.  6/21 Repeat CT scan withyperdensity in the left parietal sulci compatible with subarachnoid hemorrhages.  Non-operative management.  Post traumatic pharmacologic seizure prophylaxis for 1 week.  Speech Language Pathology; Patient needs 24 hour supervision to address safety and adequate communication  Honey  Marty RICHARDSON. Neurosurgery.      Urinary retention   Assessment & Plan    6/13 Badillo insertion for urinary retention.  6/16 Trial badillo removal.  6/17 Replaced for retrention   6/20 Trial badillo removal       UTI (urinary tract infection)- (present on admission)   Assessment & Plan    Discharged from [facility] on Cefdinir 300 mg BID for 3 days (completed 6/11).  6/17 WBC rising- Urine with small leuks- afebrile  6/19 UA with moderate leukocytes and many bacteria.  Macrobid x 5 days  Monitor       Syncope- (present on admission)   Assessment & Plan    Syncope at home and GLF.  Does report history of syncope since childhood, attributed to vasovagal response. Seizures ruled out.  EKG from referring facility with atrial fibrillation; ventricular tachycardia, unsustained; left bundle branch block.  Repeat EKG with sinus rhythm, ventricular bigeminy, nonspecific IVCD with LAD and LVH with secondary repolarization abnormality.  6/14 Carotid duplex with bilateral plaque of the bifurcation extending into the internal carotid. Velocities are consistent with < 50% stenosis of the internal carotid artery. Incidental finding: Right vertebral artery lies outside of vertebrae until the mid-distal segment. Antegrade flow is observed.  Left side dampened, slightly resistive flow is observed in the vertebral artery consistent with a possible more distal obstruction  6/14 Echocardiogram: normal chamber sizes. Normal RV size and function. LV function is mild to moderate depressed. LVEF is 40% visually, worse on ectopic beats. There is apical and lateral wall hypokinesis. RVSP estimated to 30-35 mmHg. Mild mitral annular calcification.  6/20 Amiodarone initiated    Felicitas Anthony MD. cardiology      Essential hypertension- (present on admission)   Assessment & Plan    Chronic condition treated with Maxide.  Resumed maintenance medication.  6/18 Initiate metoprolol.        Depression- (present on admission)   Assessment & Plan     Chronic condition treated with seroquel and Cymbalta.  Not resumed during acute phase due to prolonged QT.  Monitor for Cymbalta withdrawal symptoms.      GERD (gastroesophageal reflux disease)- (present on admission)   Assessment & Plan    Chronic condition treated with Nexium.  Resumed maintenance medication.       Encounter for geriatric assessment- (present on admission)   Assessment & Plan    6/13 Consult placed.  Sebastián Pacheco MD, Geriatrics.       History of MI (myocardial infarction)- (present on admission)   Assessment & Plan    Recently hospitalized at [facility] for MI.  Stent placement.  Discharge on Plavix and Eliquis.  Referring facility troponin 0.05.  Repeat troponin trend down.        Trauma- (present on admission)   Assessment & Plan    Syncope and GLF while on blood thinners. Seen at [facility].  Trauma Red Transfer Activation.  Tyler Barrios MD. Trauma Surgery.           Discussed patient condition with RN, RT and Pharmacy.  CRITICAL CARE TIME EXCLUDING PROCEDURES: 35    Minutes managing full anticoagulation, stroke, intracranial hemorrhage.

## 2019-06-21 NOTE — PROGRESS NOTES
Trauma / Surgical Daily Progress Note    Date of Service  6/21/2019    Chief Complaint  78 y.o. female admitted 6/13/2019 with Trauma    Interval Events    Depressed mental status this AM.  GCS 8.   CT head completed.    - Transfer to higher level of care   - Discussed with Dr. Tyler Barrios, trauma surgery, Dr. Felicitas Anthony, cardiology, Johan Ryder PA-C neurosurgery.  - Patient family updated    Review of Systems  Review of Systems   Unable to perform ROS: Mental acuity   Gastrointestinal:        6/20 (+) BM         Vital Signs  Temp:  [36.2 °C (97.1 °F)-37.2 °C (98.9 °F)] 36.2 °C (97.1 °F)  Pulse:  [53-69] 53  Resp:  [18-20] 18  BP: (116-138)/(57-77) 138/77  SpO2:  [92 %-94 %] 94 %    Physical Exam  Physical Exam   Constitutional:   Decpressed mental status    HENT:   Left facial ecchymosis    Eyes: Pupils are equal, round, and reactive to light. Conjunctivae are normal.   Neck: No JVD present.   Cardiovascular: Normal rate.    Pulmonary/Chest: No respiratory distress.   Abdominal: She exhibits no distension. There is no tenderness. There is no rebound and no guarding.   Neurological: GCS eye subscore is 3. GCS verbal subscore is 1. GCS motor subscore is 4.   Skin: Skin is warm and dry.   Nursing note and vitals reviewed.      Laboratory  No results found for this or any previous visit (from the past 24 hour(s)).    Fluids    Intake/Output Summary (Last 24 hours) at 06/21/19 0835  Last data filed at 06/20/19 1200   Gross per 24 hour   Intake              240 ml   Output                0 ml   Net              240 ml       Core Measures & Quality Metrics  Labs reviewed, Medications reviewed and Radiology images reviewed  Avalos catheter: No Avalos      DVT: Eliquis / Plavix.  DVT prophylaxis - mechanical: SCDs  Ulcer prophylaxis: Yes    Assessed for rehab: Patient was assess for and/or received rehabilitation services during this hospitalization    Total Score: 9    ETOH Screening     Assessment complete date:  6/14/2019        Assessment/Plan  Aphasia- (present on admission)   Assessment & Plan    6/19 - EEG with no captured seizure activity   - MRI imaging with scattered multifocal punctate areas of deep white matter infarction involving the frontal parietal regions near the vertex and cerebellar hemispheres and a punctate area of infarction in the left occipitalpole region. Subtle recent moderate-sized area of infarction involving the left posterior parasylvian region and adjacent temporal parietal region.  Anthony Ramirez MD. Neurology     Discharge planning issues- (present on admission)   Assessment & Plan    Date of admission: 6/13/2019  Date:6/14 Transfer orders from SICU  Date: 6/16 Rehab/SNF consult   Date: 6/18 Accepted to [facility]  Date: 6/21 Palliative care consult   Cleared for discharge: No  Discharge delayed: No    Discharge date:       Anticoagulated- (present on admission)   Assessment & Plan    Plavix for recent MI  Eliquis for PAF  Received KCentra 1958 units and Vitamin K 10 mg at referring facility.  Additional dose of KCentra 1622 units given in ICU.  TEG with platelet mapping with ADP elevation.  Platelet transfusion held.  6/14 Plavix resumed. Lovenox 40 mg daily cleared by neurosurgery, initiated.  6/17 Cleared to restart Eliquis- stop Lovenox. EKG with frequent ectopy.        Traumatic subarachnoid hemorrhage with loss of consciousness of 30 minutes or less (HCC)- (present on admission)   Assessment & Plan    Acute subarachnoid hemorrhage is moderate volume with no intra-axial hematoma or hydrocephalus. No significant mass effect.  6/13 Repeat CT scan stable.  6/14 CTA without aneurysm identified  6/17 Repeat CT scan stable.  6/18 Repeat CT scan stable.  6/21 Repeat CT scan withyperdensity in the left parietal sulci compatible with subarachnoid hemorrhages.  Non-operative management.  Post traumatic pharmacologic seizure prophylaxis for 1 week.  Speech Language Pathology; Patient needs 24  hour supervision to address safety and adequate communication  Honey Ferguson MD. Neurosurgery.      Urinary retention   Assessment & Plan    6/13 Badillo insertion for urinary retention.  6/16 Trial badillo removal.  6/17 Replaced for retrention   6/20 Trial badillo removal       UTI (urinary tract infection)- (present on admission)   Assessment & Plan    Discharged from [facility] on Cefdinir 300 mg BID for 3 days (completed 6/11).  6/17 WBC rising- Urine with small leuks- afebrile  6/19 UA with moderate leukocytes and many bacteria.  Macrobid x 5 days  Monitor       Syncope- (present on admission)   Assessment & Plan    Syncope at home and GLF.  Does report history of syncope since childhood, attributed to vasovagal response. Seizures ruled out.  EKG from referring facility with atrial fibrillation; ventricular tachycardia, unsustained; left bundle branch block.  Repeat EKG with sinus rhythm, ventricular bigeminy, nonspecific IVCD with LAD and LVH with secondary repolarization abnormality.  6/14 Carotid duplex with bilateral plaque of the bifurcation extending into the internal carotid. Velocities are consistent with < 50% stenosis of the internal carotid artery. Incidental finding: Right vertebral artery lies outside of vertebrae until the mid-distal segment. Antegrade flow is observed.  Left side dampened, slightly resistive flow is observed in the vertebral artery consistent with a possible more distal obstruction  6/14 Echocardiogram: normal chamber sizes. Normal RV size and function. LV function is mild to moderate depressed. LVEF is 40% visually, worse on ectopic beats. There is apical and lateral wall hypokinesis. RVSP estimated to 30-35 mmHg. Mild mitral annular calcification.  6/20 Amiodarone initiated       Essential hypertension- (present on admission)   Assessment & Plan    Chronic condition treated with Maxide.  Resumed maintenance medication.  6/18 Initiate metoprolol.        Depression- (present  on admission)   Assessment & Plan    Chronic condition treated with seroquel and Cymbalta.  Not resumed during acute phase due to prolonged QT.  Monitor for Cymbalta withdrawal symptoms.      GERD (gastroesophageal reflux disease)- (present on admission)   Assessment & Plan    Chronic condition treated with Nexium.  Resumed maintenance medication.       Encounter for geriatric assessment- (present on admission)   Assessment & Plan    6/13 Consult placed.  Sebastián Pacheco MD, Geriatrics.       History of MI (myocardial infarction)- (present on admission)   Assessment & Plan    Recently hospitalized at [facility] for MI.  Stent placement.  Discharge on Plavix and Eliquis.  Referring facility troponin 0.05.  Repeat troponin trend down.        Trauma- (present on admission)   Assessment & Plan    Syncope and GLF while on blood thinners. Seen at [facility].  Trauma Red Transfer Activation.  Tyler Barrios MD. Trauma Surgery.           Discussed patient condition with Patient and trauma surgery, Dr. Tyler Barrios.

## 2019-06-21 NOTE — DISCHARGE PLANNING
Thea has been placed on comfort care.  TCC will no loner follow.  Please re-consult for further review.

## 2019-06-21 NOTE — DISCHARGE PLANNING
Received Choice form at [phone] from  [facility]  Agency/Facility Name: [facility] (#1)  Referral sent per Choice form @ [phone]

## 2019-06-21 NOTE — PROGRESS NOTES
Monitor Summary: SB-SR 54-67, MS .16, QRS .12, QT .44  with PVCs, Couplets, triplets, 6 beats of Vtach, and BBB bts per strip from monitor room.

## 2019-06-21 NOTE — DISCHARGE PLANNING
Agency/Facility Name: [facility]   Spoke To: Intake   Outcome: [facility] is on their way to see the Pt.     Rach NAIR notified.

## 2019-06-21 NOTE — PROGRESS NOTES
Received bedside report from NORY King. Patient transported to [facility] via ACLS RN attached to ICU monitors. Patient lethargic, unable to follow commands. Right sided neglect/facial droop noted.     Neurosurgery PA at bedside to assess pt.     2 RN skin assessment performed.\    --left arm bruising  Left hip bruising  Buttocks red/blanching  Left eye bruising  Redness to top of head

## 2019-06-21 NOTE — PROGRESS NOTES
Surgery patient?: No  Date of surgery:N/A  Ambulated 50 ft on day of surgery? (N/A if today is not date of surgery): n/a  Number of times ambulated 50 feet or greater today: 0  Patient has been up to chair, edge of bed or HOB 90 degrees for all meals?: yes  Goal met? (goal is ambulatinyesg at least 50 feet 2 times on day shift, one time on night shift): No, pt weak, educated and encouraged, will continue to monitor

## 2019-06-21 NOTE — PROGRESS NOTES
"Geriatric Progress Note:  6/21/2019    CC: Fall at home,    S: patient with worsening mental status this AM. CT without worsening bleed. receptive and expressive aphasia is worsening, now with right sided hemiparesis. Discussed concern for stuttering, evolving stroke. They would like to opt for comfort care, DNAR, DNI, no tube feedings. Okay with hospice consult.     ROS: unable to obtain ROS due to patients acute neurological deterioration.     O:/76   Pulse 61   Temp 36.6 °C (97.9 °F) (Temporal)   Resp (!) 22   Ht 1.791 m (5' 10.5\")   Wt 81.6 kg (179 lb 14.3 oz)   SpO2 93%   Breastfeeding? No   BMI 25.45 kg/m²   Gen: laying bed, does not appear to understand words  Neuro: flaccid right side, did not respnd to mod pain on RUE. Moving LUE and LLE spontaneously. Worsened R sided facial droop.   Abd: soft  CV: RRR 2+ left radial pulse, 1+ right radial pulse  Delirium screen: given aphasia unable to complete  Psych: unable to complete given aphaisa  Ambulation screen: deferred.   : badillo in place    Labs/Imaging/EKG:CBC stable, BMP with mildly low K    Assessment: 77 y/o F with recent PE, MI presented with GLF found to have SAH. During her course she has developed progressive neurological symptoms after MRI showed multifocal stroke. Family is requesting transition to a comfort based approach to care with hospice referral.     Plan:   SAH  Fall  PE  Multifocal stroke with right hemiparesis, aphasia and R hemiparesis  -neuro consult reviewed. Unclear where the embolic source is coming from., concern for a fib. Vs hypercoagulable state given multiple thrombotic/emobilc events the patient has recently experienced despite being on eliquis  -this presentation is not consistent with withdrawal from a psychotropic.  -yesterday patient had shared that she would not want to continue on with current treatment plan if she would worsen. Given this wish, her two daughters have elected to pursue a comfort based " approach to treatment.   -Hospice referral placed  -comfort orders placed  -confirmed with daughter DNAR, DNI, no tube feeding.   -discussed with Dr. Barrios.     Geriatrics will sign off at this time, please let us know if we can assist in any other way. Thank you for including us in the care of this patient.     Sebastián Pacheco M.D.  Geriatric Physician   Can be reached at extension: [phone]  Monday - Friday 8-5      This note was partially complete completed using voice recognition software.  I did my best to correct errors prior to submitting this note, but for any concerns please do not hesitate to contact me.

## 2019-06-21 NOTE — DISCHARGE PLANNING
Anticipated Discharge Disposition: Hospice    Action: LSW met with daughter, Brianne at pt's bedside to discuss CHOICE for Hospice. Brianne requested that we provided options for the [place] area. Brianne stated that pt previously was set to receive [facility] Brianne stated that the nurse did an initial assessment and pt ended up in hospital right after. Brianne states that her first choice is [facility] and second choice is [facility].     LSW faxed CHOICE to [facility] and notified via Skype.     Barriers to Discharge: Acceptance by hospice provider    Plan: Assist as needed.

## 2019-06-21 NOTE — DIETARY
Nutrition services: Day 8 of admit.  79 yo female admitted following a fall.  Consult received for TF to begin. Pt is now comfort care.  RN confirmed tube feeding no longer wanted by family.

## 2019-06-21 NOTE — DISCHARGE PLANNING
Care Transition Team Assessment  The information gathered for this assessment was provided by pt's daughter, Brianne and chart review. Brianne stated that pt hasn't been in [place] for the past two months. Pt has been staying with Brianne in [facility]. Prior to being admitted Brianne stated that pt was very independent with her ADL's/IADL's. Brianne states that pt never asked for help with anything. Pt is receiving disability and jail income. Brianne stated no history of substance abuse just smoking cigarettes. Pt does have depression and anxiety and was on medications for both. Brianne stated that she is POA for healthcare.     No discharge plan in place at the time of assessment.       Information Source  Orientation : Unable to Assess  Information Given By: Relative  Informant's Name:  (Brianne Naimazonia)  Who is responsible for making decisions for patient? : POA (DaughterBrianne states to be POA. Will provide doc.)  Name(s) of Primary Decision Maker:  (Brianne Zaman )    Readmission Evaluation  Is this a readmission?: Yes - unplanned readmission    Elopement Risk  Legal Hold: No  Ambulatory or Self Mobile in Wheelchair: No-Not an Elopement Risk  Disoriented: Person-At Risk for Elopement, Place-At Risk for Elopement, Time-At Risk for Elopement, Situation-At Risk for Elopement  Psychiatric Symptoms: None  History of Wandering: No  Elopement this Admit: No  Vocalizing Wanting to Leave: No  Displays Behaviors, Body Language Wanting to Leave: No-Not at Risk for Elopement  Elopement Risk: Not at Risk for Elopement    Interdisciplinary Discharge Planning  Lives with - Patient's Self Care Capacity: Child Less than 18 Years of Age  Patient or legal guardian wants to designate a caregiver (see row info): No  Housing / Facility: 2 Story Apartment / Condo  Prior Services: None    Discharge Preparedness  What is your plan after discharge?: Uncertain - pending medical team collaboration  What are your discharge supports?: Child, Other  (comment)  Prior Functional Level: Ambulatory  Difficulity with ADLs: None  Difficulity with IADLs: None    Functional Assesment  Prior Functional Level: Ambulatory    Finances  Financial Barriers to Discharge: No  Prescription Coverage: Yes    Vision / Hearing Impairment  Vision Impairment : Yes  Right Eye Vision: Impaired, Wears Glasses  Left Eye Vision: Impaired, Wears Glasses  Hearing Impairment : No    Advance Directive  Advance Directive?: DPOA for Health Care  Durable Power of  Name and Contact :  (DaughterBrianne states to be POA. Will provide doc. )    Domestic Abuse  Have you ever been the victim of abuse or violence?: No  Physical Abuse or Sexual Abuse: No  Verbal Abuse or Emotional Abuse: No  Possible Abuse Reported to:: Not Applicable    Psychological Assessment  History of Substance Abuse: None  History of Psychiatric Problems: No  Non-compliant with Treatment: No    Discharge Risks or Barriers  Discharge risks or barriers?: No  Patient risk factors: Vulnerable adult    Anticipated Discharge Information  Anticipated discharge disposition: Discharge needs currently unknown  Discharge Contact Phone Number: [phone]

## 2019-06-22 NOTE — PROGRESS NOTES
Two RN Skin Assessment:    Patient's skin was assessed under all medical devices and over all areas of bony prominence.    Areas noted:   Bruise to left anterior face.   BUE bruising and fragile flaky skin   Sacrum pink and blanching barrier cream in use.   Heels floated with pillows.   Q2 turns in place.    No other areas of skin breakdown were noted.

## 2019-06-22 NOTE — PROGRESS NOTES
Trauma / Surgical Daily Progress Note    Date of Service  6/22/2019    Chief Complaint  78 y.o. female admitted 6/13/2019 with Trauma    Interval Events  Comfort care measures  GCS 3   Family at bedside questions counseled  Hospice arranging for home equipment, possible mon transfer to [facility] home.    Review of Systems  Review of Systems   Unable to perform ROS: Acuity of condition   Constitutional: Negative for fever.        Vital Signs  Temp:  [36.2 °C (97.1 °F)-36.8 °C (98.3 °F)] 36.4 °C (97.5 °F)  Pulse:  [59-97] 71  Resp:  [15-32] 22  BP: (126-158)/(53-91) 126/53  SpO2:  [89 %-95 %] 89 %    Physical Exam  Physical Exam   Constitutional:   Decpressed mental status    HENT:   Left facial ecchymosis    Eyes: Pupils are equal, round, and reactive to light. Conjunctivae are normal.   Neck: No JVD present.   Cardiovascular: Normal rate.    Pulmonary/Chest: No respiratory distress.   Abdominal: She exhibits no distension. There is no tenderness.   Neurological: GCS eye subscore is 1. GCS verbal subscore is 1. GCS motor subscore is 1.   Right hemiparesis   Skin: Skin is warm and dry.   Nursing note and vitals reviewed.      Laboratory  No results found for this or any previous visit (from the past 24 hour(s)).    Fluids  No intake or output data in the 24 hours ending 06/22/19 0844    Core Measures & Quality Metrics    Avalos catheter: No Avalos              Assessed for rehab: Patient returned to prior level of function, rehabilitation not indicated at this time    Total Score: 9    ETOH Screening     Assessment complete date: 6/14/2019        Assessment/Plan  Aphasia- (present on admission)   Assessment & Plan    6/19 - EEG with no captured seizure activity   - MRI imaging with scattered multifocal punctate areas of deep white matter infarction involving the frontal parietal regions near the vertex and cerebellar hemispheres and a punctate area of infarction in the left occipitalpole region. Subtle recent  moderate-sized area of infarction involving the left posterior parasylvian region and adjacent temporal parietal region.  Anthony Ramirez MD. Neurology     Discharge planning issues- (present on admission)   Assessment & Plan    Date of admission: 6/13/2019  Date:6/14 Transfer orders from SICU  Date: 6/16 Rehab/SNF consult   Date: 6/18 Accept[facility] to [facility]  Date: 6/21 Palliative care consult   Cleared for discharge: No  Discharge delayed: No    Discharge date:       Anticoagulated- (present on admission)   Assessment & Plan    Plavix for recent MI  Eliquis for PAF  Received KCentra 1958 units and Vitamin K 10 mg at referring facility.  Additional dose of KCentra 1622 units given in ICU.  TEG with platelet mapping with ADP elevation.  Platelet transfusion held.  6/14 Plavix resumed. Lovenox 40 mg daily cleared by neurosurgery, initiated.  6/17 Cleared to restart Eliquis- stop Lovenox. EKG with frequent ectopy.        Traumatic subarachnoid hemorrhage with loss of consciousness of 30 minutes or less (HCC)- (present on admission)   Assessment & Plan    Acute subarachnoid hemorrhage is moderate volume with no intra-axial hematoma or hydrocephalus. No significant mass effect.  6/13 Repeat CT scan stable.  6/14 CTA without aneurysm identified  6/17 Repeat CT scan stable.  6/18 Repeat CT scan stable.  6/21 Repeat CT scan withyperdensity in the left parietal sulci compatible with subarachnoid hemorrhages.  Non-operative management.  Post traumatic pharmacologic seizure prophylaxis for 1 week.  Speech Language Pathology; Patient needs 24 hour supervision to address safety and adequate communication  Honey Ferguson MD. Neurosurgery.      Urinary retention   Assessment & Plan    6/13 Badillo insertion for urinary retention.  6/16 Trial badillo removal.  6/17 Replaced for retrention   6/20 Trial badillo removal       UTI (urinary tract infection)- (present on admission)   Assessment & Plan    Discharged from [facility] on  Cefdinir 300 mg BID for 3 days (completed 6/11).  6/17 WBC rising- Urine with small leuks- afebrile  6/19 UA with moderate leukocytes and many bacteria.  Macrobid x 5 days  Monitor       Syncope- (present on admission)   Assessment & Plan    Syncope at home and GLF.  Does report history of syncope since childhood, attributed to vasovagal response. Seizures ruled out.  EKG from referring facility with atrial fibrillation; ventricular tachycardia, unsustained; left bundle branch block.  Repeat EKG with sinus rhythm, ventricular bigeminy, nonspecific IVCD with LAD and LVH with secondary repolarization abnormality.  6/14 Carotid duplex with bilateral plaque of the bifurcation extending into the internal carotid. Velocities are consistent with < 50% stenosis of the internal carotid artery. Incidental finding: Right vertebral artery lies outside of vertebrae until the mid-distal segment. Antegrade flow is observed.  Left side dampened, slightly resistive flow is observed in the vertebral artery consistent with a possible more distal obstruction  6/14 Echocardiogram: normal chamber sizes. Normal RV size and function. LV function is mild to moderate depressed. LVEF is 40% visually, worse on ectopic beats. There is apical and lateral wall hypokinesis. RVSP estimated to 30-35 mmHg. Mild mitral annular calcification.  6/20 Amiodarone initiated    Felicitas Anthony MD. cardiology      Essential hypertension- (present on admission)   Assessment & Plan    Chronic condition treated with Maxide.  Resumed maintenance medication.  6/18 Initiate metoprolol.        Depression- (present on admission)   Assessment & Plan    Chronic condition treated with seroquel and Cymbalta.  Not resumed during acute phase due to prolonged QT.  Monitor for Cymbalta withdrawal symptoms.      GERD (gastroesophageal reflux disease)- (present on admission)   Assessment & Plan    Chronic condition treated with Nexium.  Resumed maintenance medication.        Encounter for geriatric assessment- (present on admission)   Assessment & Plan    6/13 Consult placed.  Sebastián Pacheco MD, Geriatrics.       History of MI (myocardial infarction)- (present on admission)   Assessment & Plan    Recently hospitalized at [facility] for MI.  Stent placement.  Discharge on Plavix and Eliquis.  Referring facility troponin 0.05.  Repeat troponin trend down.        Trauma- (present on admission)   Assessment & Plan    Syncope and GLF while on blood thinners. Seen at [facility].  Trauma Red Transfer Activation.  Tyler Barrios MD. Trauma Surgery.           Discussed patient condition with Family, RN, Patient and trauma surgery. Dr. Barrios

## 2019-06-22 NOTE — PROGRESS NOTES
"Assumed care of patient from RN at 0700.     Reviewed VS, labs, orders, and notes. Comfort Care patient.     Pt laying in bed, daughters and son at bedside. A&Ox unresponsive.    /53   Pulse 71   Temp 36.4 °C (97.5 °F) (Temporal)   Resp (!) 22   Ht 1.791 m (5' 10.5\")   Wt 81.6 kg (179 lb 14.3 oz)   SpO2 89%   Breastfeeding? No   BMI 25.45 kg/m² . MEWS Score: 3.     Patient is unresponsive to assessment.     Skin assessed over bony prominences and under medical devices. Q 2 turns in place, heels are floated with pillows.    Hypoactive BS, unable to assess flatus, LBM 6/20.     NPO diet.    Non-verbal discriptor in use to assess pain, medicated per MAR.     Milagro Andrews Fall Risk Score: High. bed rails are up and family is at bedside. Fall prevention education reinforced with family.    Discussed POC with family, all questions answered at this time. Bed is locked and in the lowest position, call light within reach, Q 1 hour rounding in place. All needs met at this time.   "

## 2019-06-22 NOTE — PROGRESS NOTES
Patient arrived from [facility] to room 429    Patient is lethargic not following commands.  Right sided facial droop noted   VSS   Resp even and unlabored  Family at bedside   Call light within reach and bed is locked in lowest position

## 2019-06-22 NOTE — PALLIATIVE CARE
Palliative Care follow-up  Consult received and EMR reviewed; pt placed on CC 2 hours after PC order placed. Hospice order in and choice received by SW. Will cancel PC order; please reconsult if new needs arise.    Plan: cancel- plan in place for hospice    Thank you for allowing Palliative Care to participate in this patient's care. Please feel free to call [phone] with any questions or concerns.

## 2019-06-22 NOTE — PROGRESS NOTES
Pt laying in bed, family at the bedside, call light within reach, bed lowered and locked, fall education reinforced. Unable to evaluate patient's orientation due to being nonverbal. Pt lung sounds present with expiratory wheezing in the lower lobes and fine crackles in the upper lobes, pt heart sound is within defined limits, pt bowel sounds are normoactive in all four quadrants. Pt IV is clean,dry,intact and patent and saline locked. Pt is receiving morphine and ativan for comfort. Pt does not exit the bed. Pt is on comfort care and vital signs are being taken once per shift. Barrier cream applied to patient's bottom per turn.

## 2019-06-22 NOTE — PROGRESS NOTES
"/53   Pulse 71   Temp 36.4 °C (97.5 °F) (Temporal)   Resp (!) 22   Ht 1.791 m (5' 10.5\")   Wt 81.6 kg (179 lb 14.3 oz)   SpO2 89%     Awaiting Hospice consult.   Palliative care visit appreciated.   Family at bedside counseled.  "

## 2019-06-22 NOTE — CARE PLAN
Problem: Communication  Goal: The ability to communicate needs accurately and effectively will improve  Outcome: PROGRESSING SLOWER THAN EXPECTED  Pt is unable to communicate needs and is nonverbal    Problem: Knowledge Deficit  Goal: Knowledge of disease process/condition, treatment plan, diagnostic tests, and medications will improve  Outcome: PROGRESSING SLOWER THAN EXPECTED  Pt shows no evidence of understanding

## 2019-06-23 NOTE — PROGRESS NOTES
Pt laying in bed, family at the bedside, call light within reach, bed lowered and locked, fall education reinforced. Unable to evaluate patient's orientation due to being nonverbal. Pt lung sounds present with expiratory wheezing in the lower lobes and fine crackles in the upper lobes, pt heart sound is within defined limits, pt bowel sounds are normoactive in all four quadrants. Pt IV is clean,dry,intact and patent and saline locked. Pt is receiving morphine and ativan for comfort. Pt does not exit the bed. Pt is on comfort care and vital signs are being taken once per shift. Barrier cream applied to patient's bottom per turn.  attached

## 2019-06-23 NOTE — PROGRESS NOTES
"Assumed care of patient from RN at 0700.      Reviewed VS, labs, orders, and notes. Comfort Care patient.      Pt laying in bed, daughters at bedside. A&Ox unresponsive.     /61   Pulse 86   Temp 36.1 °C (96.9 °F) (Temporal)   Resp (!) 30   Ht 1.791 m (5' 10.5\")   Wt 81.6 kg (179 lb 14.3 oz)   SpO2 (!) 83%   Breastfeeding? No   BMI 25.45 kg/m² . MEWS Score: 6.      Patient is unresponsive to assessment.      Skin assessed over bony prominences and under medical devices. Q 2 turns in place, heels are floated with pillows.     Hypoactive BS, unable to assess flatus, LBM 6/20.      NPO diet.     Non-verbal discriptor in use to assess pain, medicated per MAR.      Milagro Andrews Fall Risk Score: High. bed rails are up and family is at bedside. Fall prevention education reinforced with family.     Discussed POC with family, all questions answered at this time. Bed is locked and in the lowest position, call light within reach, Q 1 hour rounding in place. All needs met at this time.   "

## 2019-06-23 NOTE — CARE PLAN
Problem: Pain Management  Goal: Pain level will decrease to patient's comfort goal  Outcome: PROGRESSING AS EXPECTED  Pt given pain medication whenever restless and it has been effective    Problem: Psychosocial Needs:  Goal: Level of anxiety will decrease  Outcome: PROGRESSING AS EXPECTED  Pt given anxiety whenever restless and it has been effective

## 2019-06-23 NOTE — PROGRESS NOTES
Trauma / Surgical Daily Progress Note    Date of Service  6/23/2019    Chief Complaint  78 y.o. female admitted 6/13/2019 with Trauma  GLF and head injury.  Progressive stroke.      Interval Events  Comfort care continues  GCS 3  .    PRN sedation and analgesics are available for nursing  Home Hospice a consideration but the family may not be able to manage at this point.       Review of Systems  Review of Systems   Unable to perform ROS: Acuity of condition   Constitutional: Negative for fever.        Vital Signs  Temp:  [36.1 °C (96.9 °F)] 36.1 °C (96.9 °F)  Pulse:  [86] 86  Resp:  [30] 30  BP: (124)/(61) 124/61  SpO2:  [83 %] 83 %    Physical Exam  Physical Exam   Constitutional:   Obtunded   HENT:   Left facial ecchymosis    Eyes: Pupils are equal, round, and reactive to light. Conjunctivae are normal.   Neck: No JVD present.   Cardiovascular: Normal rate.    Pulmonary/Chest: No respiratory distress.   Abdominal: Soft. She exhibits no distension. There is no tenderness.   Neurological: She is unresponsive.   Right hemiparesis   Skin: Skin is warm and dry.   Nursing note and vitals reviewed.      Laboratory  No results found for this or any previous visit (from the past 24 hour(s)).    Fluids    Intake/Output Summary (Last 24 hours) at 06/23/19 0802  Last data filed at 06/23/19 0354   Gross per 24 hour   Intake                0 ml   Output                0 ml   Net                0 ml       Core Measures & Quality Metrics    Avalos catheter: No Avalos              Assessed for rehab: Patient returned to prior level of function, rehabilitation not indicated at this time    Total Score: 9    ETOH Screening     Assessment complete date: 6/14/2019        Assessment/Plan  Aphasia- (present on admission)   Assessment & Plan    6/19 - EEG with no captured seizure activity   - MRI imaging with scattered multifocal punctate areas of deep white matter infarction involving the frontal parietal regions near the vertex and  cerebellar hemispheres and a punctate area of infarction in the left occipitalpole region. Subtle recent moderate-sized area of infarction involving the left posterior parasylvian region and adjacent temporal parietal region.  Anthony Ramirez MD. Neurology     Discharge planning issues- (present on admission)   Assessment & Plan    Date of admission: 6/13/2019  Date:6/14 Transfer orders from SICU  Date: 6/16 Rehab/SNF consult   Date: 6/18 Accepted to [facility]  Date: 6/21 Palliative care consult   Cleared for discharge: No  Discharge delayed: No    Discharge date:       Anticoagulated- (present on admission)   Assessment & Plan    Plavix for recent MI  Eliquis for PAF  Received KCentra 1958 units and Vitamin K 10 mg at referring facility.  Additional dose of KCentra 1622 units given in ICU.  TEG with platelet mapping with ADP elevation.  Platelet transfusion held.  6/14 Plavix resumed. Lovenox 40 mg daily cleared by neurosurgery, initiated.  6/17 Cleared to restart Eliquis- stop Lovenox. EKG with frequent ectopy.        Traumatic subarachnoid hemorrhage with loss of consciousness of 30 minutes or less (HCC)- (present on admission)   Assessment & Plan    Acute subarachnoid hemorrhage is moderate volume with no intra-axial hematoma or hydrocephalus. No significant mass effect.  6/13 Repeat CT scan stable.  6/14 CTA without aneurysm identified  6/17 Repeat CT scan stable.  6/18 Repeat CT scan stable.  6/21 Repeat CT scan withyperdensity in the left parietal sulci compatible with subarachnoid hemorrhages.  Non-operative management.  Post traumatic pharmacologic seizure prophylaxis for 1 week.  Speech Language Pathology; Patient needs 24 hour supervision to address safety and adequate communication  Honey Ferguson MD. Neurosurgery.      Urinary retention   Assessment & Plan    6/13 Badillo insertion for urinary retention.  6/16 Trial badillo removal.  6/17 Replaced for retrention   6/20 Trial badillo removal       UTI (urinary  tract infection)- (present on admission)   Assessment & Plan    Discharged from [facility] on Cefdinir 300 mg BID for 3 days (completed 6/11).  6/17 WBC rising- Urine with small leuks- afebrile  6/19 UA with moderate leukocytes and many bacteria.  Macrobid x 5 days  Monitor       Syncope- (present on admission)   Assessment & Plan    Syncope at home and GLF.  Does report history of syncope since childhood, attributed to vasovagal response. Seizures ruled out.  EKG from referring facility with atrial fibrillation; ventricular tachycardia, unsustained; left bundle branch block.  Repeat EKG with sinus rhythm, ventricular bigeminy, nonspecific IVCD with LAD and LVH with secondary repolarization abnormality.  6/14 Carotid duplex with bilateral plaque of the bifurcation extending into the internal carotid. Velocities are consistent with < 50% stenosis of the internal carotid artery. Incidental finding: Right vertebral artery lies outside of vertebrae until the mid-distal segment. Antegrade flow is observed.  Left side dampened, slightly resistive flow is observed in the vertebral artery consistent with a possible more distal obstruction  6/14 Echocardiogram: normal chamber sizes. Normal RV size and function. LV function is mild to moderate depressed. LVEF is 40% visually, worse on ectopic beats. There is apical and lateral wall hypokinesis. RVSP estimated to 30-35 mmHg. Mild mitral annular calcification.  6/20 Amiodarone initiated    Felicitas Anthony MD. cardiology      Essential hypertension- (present on admission)   Assessment & Plan    Chronic condition treated with Maxide.  Resumed maintenance medication.  6/18 Initiate metoprolol.        Depression- (present on admission)   Assessment & Plan    Chronic condition treated with seroquel and Cymbalta.  Not resumed during acute phase due to prolonged QT.  Monitor for Cymbalta withdrawal symptoms.      GERD (gastroesophageal reflux disease)- (present on  admission)   Assessment & Plan    Chronic condition treated with Nexium.  Resumed maintenance medication.       Encounter for geriatric assessment- (present on admission)   Assessment & Plan    6/13 Consult placed.  Sebastián Pacheco MD, Geriatrics.       History of MI (myocardial infarction)- (present on admission)   Assessment & Plan    Recently hospitalized at [facility] for MI.  Stent placement.  Discharge on Plavix and Eliquis.  Referring facility troponin 0.05.  Repeat troponin trend down.        Trauma- (present on admission)   Assessment & Plan    Syncope and GLF while on blood thinners. Seen at [facility].  Trauma Red Transfer Activation.  Tyler Barrios MD. Trauma Surgery.           Discussed patient condition with Family, RN, Patient and trauma surgery.  Discussed care with APN.  No other intervention is needed.

## 2019-06-24 NOTE — CARE PLAN
Problem: Knowledge Deficit  Goal: Knowledge of disease process/condition, treatment plan, diagnostic tests, and medications will improve  Outcome: PROGRESSING AS EXPECTED  Discussed POC with family upon the start of shift    Problem: Pain Management  Goal: Pain level will decrease to patient's comfort goal  Outcome: PROGRESSING AS EXPECTED  Consistent pain scale used

## 2019-06-24 NOTE — PROGRESS NOTES
Assessment complete.  Pt resting comfortably in bed. Appears calm and in no apparent distress.  Occasional 5 second pauses between respirations per day RN. Will continue to monitor.  PRN IV ativan and morphine for comfort. IV flushed and patent.  Q2h turns in place.   POC discussed with daughter at bedside.  Hourly rounding in place.

## 2019-06-24 NOTE — PROGRESS NOTES
Assumed care of patient from RN at 0700.      Reviewed VS, labs, orders, and notes. Comfort Care patient.      Pt laying in bed, daughters at bedside. A&Ox unresponsive.     Patient is unresponsive to assessment. Increased oral secretions overnight.     Skin assessed over bony prominences and under medical devices. Q 2 turns in place, heels are floated with pillows.     Infrequent urinary incontinence, LBM 6/20.      NPO diet.     Non-verbal discriptor in use to assess pain, previously medicated per MAR.      Milagro Andrews Fall Risk Score: High. bed rails are up and family is at bedside. Fall prevention education reinforced with family.     Discussed POC with family, all questions answered at this time. Bed is locked and in the lowest position, call light within reach, Q 1 hour rounding in place. All needs met at this time.

## 2019-06-24 NOTE — CARE PLAN
Problem: Communication  Goal: The ability to communicate needs accurately and effectively will improve  Outcome: PROGRESSING AS EXPECTED      Problem: Knowledge Deficit  Goal: Knowledge of disease process/condition, treatment plan, diagnostic tests, and medications will improve  Outcome: PROGRESSING AS EXPECTED  POC discussed with daughters at bedside.    Problem: Pain Management  Goal: Pain level will decrease to patient's comfort goal  Outcome: PROGRESSING AS EXPECTED  Comfort care pt. Pain assessed and medicated per MAR.

## 2019-06-24 NOTE — PROGRESS NOTES
Trauma / Surgical Daily Progress Note    Date of Service  6/24/2019    Chief Complaint  78 y.o. female admitted 6/13/2019 with Trauma  GLF followed by stroke.      Interval Events  Remains on comfort care.   Family at bedside earlier  Remains obtunded.  Unresponsive.    Discussed care with nursing  Home hospice at this point probably not practical  Overall appears comfortable.        Review of Systems  Review of Systems   Unable to perform ROS: Acuity of condition   Constitutional: Negative for fever.        Vital Signs  Temp:  [36.8 °C (98.3 °F)] 36.8 °C (98.3 °F)  Pulse:  [61] 61  Resp:  [20] 20  BP: (112)/(61) 112/61  SpO2:  [84 %] 84 %    Physical Exam  Physical Exam   Constitutional:   Decpressed mental status    HENT:   Left facial ecchymosis    Eyes: Pupils are equal, round, and reactive to light. Conjunctivae are normal.   Neck: No JVD present.   Pulmonary/Chest: She is in respiratory distress.   Abdominal: She exhibits no distension. There is no tenderness.   Neurological: GCS eye subscore is 1. GCS verbal subscore is 1. GCS motor subscore is 1.   No spontaneous movement   Skin: Skin is warm and dry.   Nursing note and vitals reviewed.      Laboratory  No results found for this or any previous visit (from the past 24 hour(s)).    Fluids    Intake/Output Summary (Last 24 hours) at 06/24/19 0753  Last data filed at 06/23/19 1400   Gross per 24 hour   Intake                0 ml   Output                0 ml   Net                0 ml       Core Measures & Quality Metrics    Avalos catheter: No Avalos              Assessed for rehab: Patient returned to prior level of function, rehabilitation not indicated at this time    Total Score: 9    ETOH Screening     Assessment complete date: 6/14/2019        Assessment/Plan  Aphasia- (present on admission)   Assessment & Plan    6/19 - EEG with no captured seizure activity   - MRI imaging with scattered multifocal punctate areas of deep white matter infarction involving  the frontal parietal regions near the vertex and cerebellar hemispheres and a punctate area of infarction in the left occipitalpole region. Subtle recent moderate-sized area of infarction involving the left posterior parasylvian region and adjacent temporal parietal region.  Anthony Ramirez MD. Neurology     Discharge planning issues- (present on admission)   Assessment & Plan    Date of admission: 6/13/2019  Date:6/14 Transfer orders from SICU  Date: 6/16 Rehab/SNF consult   Date: 6/18 Accepted to [facility]  Date: 6/21 Palliative care consult   Cleared for discharge: No  Discharge delayed: No    Discharge date:       Anticoagulated- (present on admission)   Assessment & Plan    Plavix for recent MI  Eliquis for PAF  Received KCentra 1958 units and Vitamin K 10 mg at referring facility.  Additional dose of KCentra 1622 units given in ICU.  TEG with platelet mapping with ADP elevation.  Platelet transfusion held.  6/14 Plavix resumed. Lovenox 40 mg daily cleared by neurosurgery, initiated.  6/17 Cleared to restart Eliquis- stop Lovenox. EKG with frequent ectopy.        Traumatic subarachnoid hemorrhage with loss of consciousness of 30 minutes or less (HCC)- (present on admission)   Assessment & Plan    Acute subarachnoid hemorrhage is moderate volume with no intra-axial hematoma or hydrocephalus. No significant mass effect.  6/13 Repeat CT scan stable.  6/14 CTA without aneurysm identified  6/17 Repeat CT scan stable.  6/18 Repeat CT scan stable.  6/21 Repeat CT scan withyperdensity in the left parietal sulci compatible with subarachnoid hemorrhages.  Non-operative management.  Post traumatic pharmacologic seizure prophylaxis for 1 week.  Speech Language Pathology; Patient needs 24 hour supervision to address safety and adequate communication  Honey Ferguson MD. Neurosurgery.      Urinary retention   Assessment & Plan    6/13 Badillo insertion for urinary retention.  6/16 Trial badillo removal.  6/17 Replaced for  retrention   6/20 Trial badillo removal       UTI (urinary tract infection)- (present on admission)   Assessment & Plan    Discharged from [facility] on Cefdinir 300 mg BID for 3 days (completed 6/11).  6/17 WBC rising- Urine with small leuks- afebrile  6/19 UA with moderate leukocytes and many bacteria.  Macrobid x 5 days  Monitor       Syncope- (present on admission)   Assessment & Plan    Syncope at home and GLF.  Does report history of syncope since childhood, attributed to vasovagal response. Seizures ruled out.  EKG from referring facility with atrial fibrillation; ventricular tachycardia, unsustained; left bundle branch block.  Repeat EKG with sinus rhythm, ventricular bigeminy, nonspecific IVCD with LAD and LVH with secondary repolarization abnormality.  6/14 Carotid duplex with bilateral plaque of the bifurcation extending into the internal carotid. Velocities are consistent with < 50% stenosis of the internal carotid artery. Incidental finding: Right vertebral artery lies outside of vertebrae until the mid-distal segment. Antegrade flow is observed.  Left side dampened, slightly resistive flow is observed in the vertebral artery consistent with a possible more distal obstruction  6/14 Echocardiogram: normal chamber sizes. Normal RV size and function. LV function is mild to moderate depressed. LVEF is 40% visually, worse on ectopic beats. There is apical and lateral wall hypokinesis. RVSP estimated to 30-35 mmHg. Mild mitral annular calcification.  6/20 Amiodarone initiated    Felicitas Anthony MD. cardiology      Essential hypertension- (present on admission)   Assessment & Plan    Chronic condition treated with Maxide.  Resumed maintenance medication.  6/18 Initiate metoprolol.        Depression- (present on admission)   Assessment & Plan    Chronic condition treated with seroquel and Cymbalta.  Not resumed during acute phase due to prolonged QT.  Monitor for Cymbalta withdrawal symptoms.       GERD (gastroesophageal reflux disease)- (present on admission)   Assessment & Plan    Chronic condition treated with Nexium.  Resumed maintenance medication.       Encounter for geriatric assessment- (present on admission)   Assessment & Plan    6/13 Consult placed.  Sebastián Pacheco MD, Geriatrics.       History of MI (myocardial infarction)- (present on admission)   Assessment & Plan    Recently hospitalized at [facility] for MI.  Stent placement.  Discharge on Plavix and Eliquis.  Referring facility troponin 0.05.  Repeat troponin trend down.        Trauma- (present on admission)   Assessment & Plan    Syncope and GLF while on blood thinners. Seen at [facility].  Trauma Red Transfer Activation.  Tyler Barrios MD. Trauma Surgery.           Discussed patient condition with RN. And APN.  Comfort care only.  Continue analgesics and sedation prn.  No family here currently

## 2019-06-25 NOTE — PROGRESS NOTES
Trauma / Surgical Daily Progress Note    Date of Service  6/25/2019    Chief Complaint  78 y.o. female admitted 6/13/2019 with Trauma  GLF  Interval Events  Comfort care  Increased work of breathing  Family at bedside  Obtunded and unresponsive  Case discussed with nursing.  Home hospice not pratical at this time  Continued care on the general surgical mustafa.      MS PCA for comfort      Review of Systems  Review of Systems   Unable to perform ROS: Acuity of condition   Constitutional: Negative for fever.        Vital Signs  Temp:  [36.4 °C (97.5 °F)-37.1 °C (98.8 °F)] 37.1 °C (98.8 °F)  Pulse:  [65-82] 82  Resp:  [20-22] 20  BP: (106-113)/(53-64) 106/53  SpO2:  [83 %-90 %] 83 %    Physical Exam  Physical Exam   Constitutional:   Decpressed mental status    HENT:   Left facial ecchymosis evolving     Eyes: Pupils are equal, round, and reactive to light. Conjunctivae are normal.   Neck: No JVD present.   Pulmonary/Chest: She is in respiratory distress.   Abdominal: She exhibits no distension. There is no tenderness.   Neurological: GCS eye subscore is 1. GCS verbal subscore is 1. GCS motor subscore is 1.   No spontaneous movement   Skin: Skin is warm and dry.   Nursing note and vitals reviewed.      Laboratory  No results found for this or any previous visit (from the past 24 hour(s)).    Fluids    Intake/Output Summary (Last 24 hours) at 06/25/19 0749  Last data filed at 06/24/19 2038   Gross per 24 hour   Intake                0 ml   Output                0 ml   Net                0 ml       Core Measures & Quality Metrics    Avalos catheter: No Avalos              Assessed for rehab: Patient returned to prior level of function, rehabilitation not indicated at this time    Total Score: 9    ETOH Screening     Assessment complete date: 6/14/2019        Assessment/Plan  Aphasia- (present on admission)   Assessment & Plan    6/19 - EEG with no captured seizure activity   - MRI imaging with scattered multifocal punctate  areas of deep white matter infarction involving the frontal parietal regions near the vertex and cerebellar hemispheres and a punctate area of infarction in the left occipitalpole region. Subtle recent moderate-sized area of infarction involving the left posterior parasylvian region and adjacent temporal parietal region.  Anthony Ramirez MD. Neurology     Discharge planning issues- (present on admission)   Assessment & Plan    Date of admission: 6/13/2019  Date:6/14 Transfer orders from SICU  Date: 6/16 Rehab/SNF consult   Date: 6/18 [facility]  Date: 6/21 Palliative care consult   Cleared for discharge: No  Discharge delayed: No    Discharge date:       Anticoagulated- (present on admission)   Assessment & Plan    Plavix for recent MI  Eliquis for PAF  Received KCentra 1958 units and Vitamin K 10 mg at referring facility.  Additional dose of KCentra 1622 units given in ICU.  TEG with platelet mapping with ADP elevation.  Platelet transfusion held.  6/14 Plavix resumed. Lovenox 40 mg daily cleared by neurosurgery, initiated.  6/17 Cleared to restart Eliquis- stop Lovenox. EKG with frequent ectopy.        Traumatic subarachnoid hemorrhage with loss of consciousness of 30 minutes or less (HCC)- (present on admission)   Assessment & Plan    Acute subarachnoid hemorrhage is moderate volume with no intra-axial hematoma or hydrocephalus. No significant mass effect.  6/13 Repeat CT scan stable.  6/14 CTA without aneurysm identified  6/17 Repeat CT scan stable.  6/18 Repeat CT scan stable.  6/21 Repeat CT scan withyperdensity in the left parietal sulci compatible with subarachnoid hemorrhages.  Non-operative management.  Post traumatic pharmacologic seizure prophylaxis for 1 week.  Speech Language Pathology; Patient needs 24 hour supervision to address safety and adequate communication  Honey Ferguson MD. Neurosurgery.      Urinary retention   Assessment & Plan    6/13 Avalos insertion for urinary retention.  6/16  Trial badillo removal.  6/17 Replaced for retrention   6/20 Trial badillo removal       UTI (urinary tract infection)- (present on admission)   Assessment & Plan    Discharged from [facility] on Cefdinir 300 mg BID for 3 days (completed 6/11).  6/17 WBC rising- Urine with small leuks- afebrile  6/19 UA with moderate leukocytes and many bacteria.  Macrobid x 5 days  Monitor       Syncope- (present on admission)   Assessment & Plan    Syncope at home and GLF.  Does report history of syncope since childhood, attributed to vasovagal response. Seizures ruled out.  EKG from referring facility with atrial fibrillation; ventricular tachycardia, unsustained; left bundle branch block.  Repeat EKG with sinus rhythm, ventricular bigeminy, nonspecific IVCD with LAD and LVH with secondary repolarization abnormality.  6/14 Carotid duplex with bilateral plaque of the bifurcation extending into the internal carotid. Velocities are consistent with < 50% stenosis of the internal carotid artery. Incidental finding: Right vertebral artery lies outside of vertebrae until the mid-distal segment. Antegrade flow is observed.  Left side dampened, slightly resistive flow is observed in the vertebral artery consistent with a possible more distal obstruction  6/14 Echocardiogram: normal chamber sizes. Normal RV size and function. LV function is mild to moderate depressed. LVEF is 40% visually, worse on ectopic beats. There is apical and lateral wall hypokinesis. RVSP estimated to 30-35 mmHg. Mild mitral annular calcification.  6/20 Amiodarone initiated    Felicitas Anthony MD. cardiology      Essential hypertension- (present on admission)   Assessment & Plan    Chronic condition treated with Maxide.  Resumed maintenance medication.  6/18 Initiate metoprolol.        Depression- (present on admission)   Assessment & Plan    Chronic condition treated with seroquel and Cymbalta.  Not resumed during acute phase due to prolonged QT.  Monitor  for Cymbalta withdrawal symptoms.      GERD (gastroesophageal reflux disease)- (present on admission)   Assessment & Plan    Chronic condition treated with Nexium.  Resumed maintenance medication.       Encounter for geriatric assessment- (present on admission)   Assessment & Plan    6/13 Consult placed.  Sebastián Pacheco MD, Geriatrics.       History of MI (myocardial infarction)- (present on admission)   Assessment & Plan    Recently hospitalized at [facility] for MI.  Stent placement.  Discharge on Plavix and Eliquis.  Referring facility troponin 0.05.  Repeat troponin trend down.        Trauma- (present on admission)   Assessment & Plan    Syncope and GLF while on blood thinners. Seen at [facility].  Trauma Red Transfer Activation.  Tyler Barrios MD. Trauma Surgery.           See Discharge summary.    MG

## 2019-06-25 NOTE — PROGRESS NOTES
Pt resting comfortably in bed.  Increased work of breathing noted.  PCA morphine for comfort. All settings verified with day RN at beginning of shift.  Q2h turns in place.   POC discussed with daughter at bedside.  Hourly rounding in place.

## 2019-06-25 NOTE — CARE PLAN
Problem: Knowledge Deficit  Goal: Knowledge of disease process/condition, treatment plan, diagnostic tests, and medications will improve  Outcome: PROGRESSING AS EXPECTED  POC discussed with daughter at bedside. Questions and concerns addressed.    Problem: Pain Management  Goal: Pain level will decrease to patient's comfort goal  Outcome: PROGRESSING AS EXPECTED  PCA morphine for comfort. All setting verified with 2nd RN. Dosage discussed with daughter.

## 2019-06-25 NOTE — CARE PLAN
Problem: Pain Management  Goal: Pain level will decrease to patient's comfort goal  5 mg morphine basal PCA in use. Patient appears comfortable.     Problem: Skin Integrity  Goal: Risk for impaired skin integrity will decrease  Family still accepting patient to be turned q2h and pillows in use for comfort.

## 2019-06-25 NOTE — PROGRESS NOTES
Assumed care of patient. Daughter, Brianne, at bedside. Patient does not respond to voice or touch. Heavy, uneven breaths being taken. Patient sats between 60-80%. Left eye is bruised from fall. Family accepted this RN to assess patient. Q2h turns in place for comfort. No other needs. Family will notify RN with any needs and staff will monitor closely.

## 2019-06-25 NOTE — DISCHARGE PLANNING
Anticipated Discharge Disposition: Comfort Care.    Action: LSW discussed patient's condition with RN Kiara.  Per Kiara, most recent MD recommendation is for this patient to stay in this hospital instead of discharging home on hospice.    LSW met with Brianne Flores, patient's daughter at bedside.  Brianne indicates patient's condition is declining and she would not be able to provide care at home.    Brianne reports, [place] will need to contact [facility] in [place] when deemed appropriate, their phone number is [phone].    Barriers to Discharge: None.    Plan: As Above.

## 2019-06-26 NOTE — PROGRESS NOTES
report completed. Comfort care patient.  Patient sleeping in bed.  In no acute distress.   on RA.  Morphine PCA in use.    Family at bedside. POC discussed with family and all questions answered. No additional needs at this time.

## 2019-06-26 NOTE — CARE PLAN
Problem: Communication  Goal: The ability to communicate needs accurately and effectively will improve    Intervention: Educate patient and significant other/support system about the plan of care, procedures, treatments, medications and allow for questions  Discussed plan of care with patient's family at bedside. Plan for pain control as well as for patient comfort. Educated family to call for assistance whenever necessary. Call light within reach of family member. Rounding schedule in place.       Problem: Infection  Goal: Will remain free from infection    Intervention: Implement standard precautions and perform hand washing before and after patient contact  Hand hygiene performed prior to and after entering pt room. Gloves worn during patient interactions.

## 2019-07-17 NOTE — DISCHARGE SUMMARY
DATE OF ADMISSION:  06/13/2019    DATE OF DISCHARGE:  06/26/2019    DISCHARGE DIAGNOSES:  1.  Ground level fall with head injury.  2.  Traumatic subarachnoid hemorrhage with brief loss of consciousness.  3.  Syncope.  4.  History of myocardial infarction and coronary artery stent.  5.  Anticoagulation, on Plavix and Eliquis.  6.  Multifocal brain infarcts.  7.  Aphasia.  8.  Urinary tract infection.  9.  Essential hypertension.    PHYSICIANS:  1.  Tyler Barrios MD, Merged with [facility] trauma surgery.  2.  Honey Ferguson MD, neurosurgery.  3.  Jose Daniel Gayle MD, geriatrics.  4.  Rickey Hernandez MD, physical medicine.  5.  Felicitas Anthony MD, cardiology.  6.  Anthony Ramirez MD, neurology.    OPERATIONS AND PROCEDURES:  Electroencephalogram 06/19/2019 supervised by   Jefferson Lester MD    HISTORY:  The patient is a 78-year-old woman who fell following a syncopal   episode.  She struck her head and experienced a brief loss of consciousness.    Initially evaluated at [facility] where a CT scan showed a   subarachnoid hemorrhage.  She was taking Eliquis and Plavix at the time of   injury for recent cardiac stents.  At [facility], Kcentra was infused and   she was transferred to [facility].  Triaged as a full trauma activation.    She was met shortly after arrival by both trauma surgery and neurosurgery.    Admitting GCS was 14.  She was admitted to the trauma ICU for serial neuro   checks.  CTA of the head and neck showed no aneurysm and redemonstration of   bilateral subarachnoid hemorrhage.  On day #4, CT of the head demonstrated a   decreased subarachnoid blood as well as cerebral atrophy and hypodensity of   the white matter most consistent with small vessel ischemic change.  Plavix   and Lovenox were started on day #2 and on day #5, Lovenox was stopped and   Eliquis started.    Cardiology was consulted on 06/18 for arrhythmias.  She was not experiencing   any chest discomfort or shortness of breath.   EKG showed left ventricular   hypertrophy and sinus rhythm with frequent PVCs as well as a right   bundle-branch.  There appeared to be no evidence of ischemia and a   beta-blocker was started.    Following day, the patient was slightly confused and aphasia was noted.  This   demonstrated chronic microvascular ischemic changes and multifocal areas of   deep white matter infarction involving the frontoparietal regions, cerebellar   hemispheres and a punctate area of infarction in the left occipital region.    There was also a moderate sized infarct of the left posterior perisylvian   region and adjacent temporoparietal region.  There was a large infarct in the   posterior left frontal lobe.  Neurology was consulted.  Neurology suggested   the infarcts were due to multifocal emboli, most likely of cardiac origin.    The patient was fully anticoagulated at the time of stroke.  Further cardiac   ischemic workup was put on hold.  Anticoagulation was continued.  Macrobid was   started for UTI.  Initially, her receptive aphasia improved.  She was   following commands.  On 06/21, the patient was obtunded.  This demonstrated   known subarachnoid hemorrhages and nonspecific white matter changes, again   associated with small vessel ischemic disease.  She developed a right-sided   hemiparesis.  Family opted for comfort care at this point and requested a do   not resuscitate, do not intubate status.  She was transferred from the ICU to   the floor for comfort care.  She became increasingly obtunded and on 06/23,   GCS was noted as 3.  Home hospice was not feasible at this point.  The patient   was treated with morphine for comfort.  On 06/26, the patient was pronounced   dead at 0630 hours.       ____________________________________     MD JENNA GARCIA / KEZIA    DD:  07/17/2019 13:44:40  DT:  07/17/2019 14:15:57    D#:  [id number]  Job#:  [id number]